# Patient Record
Sex: FEMALE | Race: WHITE | NOT HISPANIC OR LATINO | Employment: PART TIME
[De-identification: names, ages, dates, MRNs, and addresses within clinical notes are randomized per-mention and may not be internally consistent; named-entity substitution may affect disease eponyms.]

---

## 2017-12-17 ENCOUNTER — HEALTH MAINTENANCE LETTER (OUTPATIENT)
Age: 47
End: 2017-12-17

## 2020-08-05 ENCOUNTER — APPOINTMENT (OUTPATIENT)
Dept: CT IMAGING | Facility: HOSPITAL | Age: 50
End: 2020-08-05
Attending: EMERGENCY MEDICINE
Payer: COMMERCIAL

## 2020-08-05 ENCOUNTER — HOSPITAL ENCOUNTER (EMERGENCY)
Facility: HOSPITAL | Age: 50
Discharge: HOME | End: 2020-08-05
Attending: EMERGENCY MEDICINE | Admitting: EMERGENCY MEDICINE
Payer: COMMERCIAL

## 2020-08-05 VITALS
DIASTOLIC BLOOD PRESSURE: 76 MMHG | SYSTOLIC BLOOD PRESSURE: 120 MMHG | OXYGEN SATURATION: 94 % | HEART RATE: 71 BPM | RESPIRATION RATE: 18 BRPM | TEMPERATURE: 98.3 F | WEIGHT: 293 LBS

## 2020-08-05 DIAGNOSIS — K52.9 ILEOCOLITIS: ICD-10-CM

## 2020-08-05 LAB
ALBUMIN SERPL-MCNC: 3.4 G/DL (ref 3.4–5)
ALBUMIN UR-MCNC: 10 MG/DL
ALP SERPL-CCNC: 78 U/L (ref 40–150)
ALT SERPL W P-5'-P-CCNC: 19 U/L (ref 0–50)
AMORPH CRY #/AREA URNS HPF: ABNORMAL /HPF
ANION GAP SERPL CALCULATED.3IONS-SCNC: 2 MMOL/L (ref 3–14)
APPEARANCE UR: ABNORMAL
AST SERPL W P-5'-P-CCNC: 7 U/L (ref 0–45)
BACTERIA #/AREA URNS HPF: ABNORMAL /HPF
BASOPHILS # BLD AUTO: 0 10E9/L (ref 0–0.2)
BASOPHILS NFR BLD AUTO: 0.3 %
BILIRUB SERPL-MCNC: 0.5 MG/DL (ref 0.2–1.3)
BILIRUB UR QL STRIP: NEGATIVE
BUN SERPL-MCNC: 9 MG/DL (ref 7–30)
CALCIUM SERPL-MCNC: 8.7 MG/DL (ref 8.5–10.1)
CHLORIDE SERPL-SCNC: 109 MMOL/L (ref 94–109)
CO2 SERPL-SCNC: 29 MMOL/L (ref 20–32)
COLOR UR AUTO: YELLOW
CREAT SERPL-MCNC: 0.76 MG/DL (ref 0.52–1.04)
DIFFERENTIAL METHOD BLD: ABNORMAL
EOSINOPHIL # BLD AUTO: 0.3 10E9/L (ref 0–0.7)
EOSINOPHIL NFR BLD AUTO: 2.1 %
ERYTHROCYTE [DISTWIDTH] IN BLOOD BY AUTOMATED COUNT: 12.4 % (ref 10–15)
GFR SERPL CREATININE-BSD FRML MDRD: >90 ML/MIN/{1.73_M2}
GLUCOSE SERPL-MCNC: 111 MG/DL (ref 70–99)
GLUCOSE UR STRIP-MCNC: NEGATIVE MG/DL
HCG UR QL: NEGATIVE
HCT VFR BLD AUTO: 44.9 % (ref 35–47)
HGB BLD-MCNC: 15.1 G/DL (ref 11.7–15.7)
HGB UR QL STRIP: NEGATIVE
IMM GRANULOCYTES # BLD: 0.1 10E9/L (ref 0–0.4)
IMM GRANULOCYTES NFR BLD: 0.7 %
KETONES UR STRIP-MCNC: NEGATIVE MG/DL
LACTATE BLD-SCNC: 1.1 MMOL/L (ref 0.7–2)
LEUKOCYTE ESTERASE UR QL STRIP: NEGATIVE
LIPASE SERPL-CCNC: 146 U/L (ref 73–393)
LYMPHOCYTES # BLD AUTO: 2.6 10E9/L (ref 0.8–5.3)
LYMPHOCYTES NFR BLD AUTO: 20.3 %
MCH RBC QN AUTO: 30.3 PG (ref 26.5–33)
MCHC RBC AUTO-ENTMCNC: 33.6 G/DL (ref 31.5–36.5)
MCV RBC AUTO: 90 FL (ref 78–100)
MONOCYTES # BLD AUTO: 0.7 10E9/L (ref 0–1.3)
MONOCYTES NFR BLD AUTO: 5.2 %
MUCOUS THREADS #/AREA URNS LPF: PRESENT /LPF
NEUTROPHILS # BLD AUTO: 9.3 10E9/L (ref 1.6–8.3)
NEUTROPHILS NFR BLD AUTO: 71.4 %
NITRATE UR QL: NEGATIVE
NRBC # BLD AUTO: 0 10*3/UL
NRBC BLD AUTO-RTO: 0 /100
PH UR STRIP: 7.5 PH (ref 4.7–8)
PLATELET # BLD AUTO: 276 10E9/L (ref 150–450)
POTASSIUM SERPL-SCNC: 4.1 MMOL/L (ref 3.4–5.3)
PROT SERPL-MCNC: 7.4 G/DL (ref 6.8–8.8)
RBC # BLD AUTO: 4.98 10E12/L (ref 3.8–5.2)
RBC #/AREA URNS AUTO: 1 /HPF (ref 0–2)
SODIUM SERPL-SCNC: 140 MMOL/L (ref 133–144)
SOURCE: ABNORMAL
SP GR UR STRIP: 1.03 (ref 1–1.03)
SPECIMEN SOURCE: NORMAL
SQUAMOUS #/AREA URNS AUTO: 6 /HPF (ref 0–1)
UROBILINOGEN UR STRIP-MCNC: NORMAL MG/DL (ref 0–2)
WBC # BLD AUTO: 13 10E9/L (ref 4–11)
WBC #/AREA URNS AUTO: 1 /HPF (ref 0–5)
WET PREP SPEC: NORMAL

## 2020-08-05 PROCEDURE — 25000128 H RX IP 250 OP 636: Performed by: EMERGENCY MEDICINE

## 2020-08-05 PROCEDURE — 85025 COMPLETE CBC W/AUTO DIFF WBC: CPT | Performed by: EMERGENCY MEDICINE

## 2020-08-05 PROCEDURE — 87210 SMEAR WET MOUNT SALINE/INK: CPT | Performed by: EMERGENCY MEDICINE

## 2020-08-05 PROCEDURE — 96361 HYDRATE IV INFUSION ADD-ON: CPT

## 2020-08-05 PROCEDURE — 99284 EMERGENCY DEPT VISIT MOD MDM: CPT | Mod: 25

## 2020-08-05 PROCEDURE — 36415 COLL VENOUS BLD VENIPUNCTURE: CPT | Performed by: EMERGENCY MEDICINE

## 2020-08-05 PROCEDURE — 25500064 ZZH RX 255 OP 636: Performed by: RADIOLOGY

## 2020-08-05 PROCEDURE — 25800030 ZZH RX IP 258 OP 636: Performed by: EMERGENCY MEDICINE

## 2020-08-05 PROCEDURE — 83605 ASSAY OF LACTIC ACID: CPT | Performed by: EMERGENCY MEDICINE

## 2020-08-05 PROCEDURE — 81025 URINE PREGNANCY TEST: CPT | Performed by: EMERGENCY MEDICINE

## 2020-08-05 PROCEDURE — 25000132 ZZH RX MED GY IP 250 OP 250 PS 637: Performed by: EMERGENCY MEDICINE

## 2020-08-05 PROCEDURE — 83690 ASSAY OF LIPASE: CPT | Performed by: EMERGENCY MEDICINE

## 2020-08-05 PROCEDURE — 81001 URINALYSIS AUTO W/SCOPE: CPT | Performed by: EMERGENCY MEDICINE

## 2020-08-05 PROCEDURE — 96374 THER/PROPH/DIAG INJ IV PUSH: CPT | Mod: 59

## 2020-08-05 PROCEDURE — 99283 EMERGENCY DEPT VISIT LOW MDM: CPT | Mod: Z6 | Performed by: EMERGENCY MEDICINE

## 2020-08-05 PROCEDURE — 74177 CT ABD & PELVIS W/CONTRAST: CPT | Mod: TC

## 2020-08-05 PROCEDURE — 80053 COMPREHEN METABOLIC PANEL: CPT | Performed by: EMERGENCY MEDICINE

## 2020-08-05 RX ORDER — IOPAMIDOL 612 MG/ML
100 INJECTION, SOLUTION INTRAVASCULAR ONCE
Status: COMPLETED | OUTPATIENT
Start: 2020-08-05 | End: 2020-08-05

## 2020-08-05 RX ORDER — METRONIDAZOLE 500 MG/1
500 TABLET ORAL 3 TIMES DAILY
Qty: 29 TABLET | Refills: 0 | Status: SHIPPED | OUTPATIENT
Start: 2020-08-05 | End: 2020-08-15

## 2020-08-05 RX ORDER — METRONIDAZOLE 500 MG/1
500 TABLET ORAL ONCE
Status: COMPLETED | OUTPATIENT
Start: 2020-08-05 | End: 2020-08-05

## 2020-08-05 RX ORDER — TAMOXIFEN CITRATE 10 MG/1
10 TABLET ORAL DAILY
COMMUNITY
End: 2021-10-14

## 2020-08-05 RX ORDER — MORPHINE SULFATE 4 MG/ML
4 INJECTION, SOLUTION INTRAMUSCULAR; INTRAVENOUS ONCE
Status: COMPLETED | OUTPATIENT
Start: 2020-08-05 | End: 2020-08-05

## 2020-08-05 RX ORDER — SODIUM CHLORIDE 9 MG/ML
INJECTION, SOLUTION INTRAVENOUS CONTINUOUS
Status: DISCONTINUED | OUTPATIENT
Start: 2020-08-05 | End: 2020-08-05 | Stop reason: HOSPADM

## 2020-08-05 RX ORDER — CIPROFLOXACIN 500 MG/1
500 TABLET, FILM COATED ORAL ONCE
Status: COMPLETED | OUTPATIENT
Start: 2020-08-05 | End: 2020-08-05

## 2020-08-05 RX ORDER — CIPROFLOXACIN 500 MG/1
500 TABLET, FILM COATED ORAL 2 TIMES DAILY
Qty: 19 TABLET | Refills: 0 | Status: SHIPPED | OUTPATIENT
Start: 2020-08-05 | End: 2020-08-15

## 2020-08-05 RX ADMIN — SODIUM CHLORIDE: 9 INJECTION, SOLUTION INTRAVENOUS at 09:37

## 2020-08-05 RX ADMIN — MORPHINE SULFATE 4 MG: 4 INJECTION, SOLUTION INTRAMUSCULAR; INTRAVENOUS at 09:40

## 2020-08-05 RX ADMIN — METRONIDAZOLE 500 MG: 500 TABLET ORAL at 12:32

## 2020-08-05 RX ADMIN — IOPAMIDOL 100 ML: 612 INJECTION, SOLUTION INTRAVENOUS at 11:13

## 2020-08-05 RX ADMIN — CIPROFLOXACIN HYDROCHLORIDE 500 MG: 500 TABLET, FILM COATED ORAL at 12:33

## 2020-08-05 ASSESSMENT — ENCOUNTER SYMPTOMS
NECK STIFFNESS: 0
NECK PAIN: 0
HEMATOLOGIC/LYMPHATIC NEGATIVE: 1
ALLERGIC/IMMUNOLOGIC NEGATIVE: 1
MYALGIAS: 0
ABDOMINAL PAIN: 1
ENDOCRINE NEGATIVE: 1
EYES NEGATIVE: 1
RESPIRATORY NEGATIVE: 1
CARDIOVASCULAR NEGATIVE: 1
NAUSEA: 0
VOMITING: 0
NEUROLOGICAL NEGATIVE: 1
CONSTITUTIONAL NEGATIVE: 1
MUSCULOSKELETAL NEGATIVE: 1
PSYCHIATRIC NEGATIVE: 1

## 2020-08-05 NOTE — ED NOTES
Discharge instructions given. Verbalized understanding. Denies pain at this time. Declined discharge vitals. Denies any questions or concerns. Ambulated out of ED independently with steady gait.

## 2020-08-05 NOTE — ED PROVIDER NOTES
"  History     Chief Complaint   Patient presents with     Abdominal Pain     c/o abdominal pain for 2 days. Denies nausea, vomiting, diarrhea, constipation, or fevers. When asked where pain is points to entire abdomen area and states \"it's all over.\" Rates pain 10/10. Is sitting calmly with no noted distress. Reports history of breast cancer with double mastectomy in October of 2018.      ROQUE Berry is a 50 year old female who is today with complaints of abdominal pain.  Symptoms present x 2 days.  Patient denies any nausea vomiting or diarrhea.  Patient has otherwise been in usual state of health.  Patient denies any recent trauma.  No additional complaints    Allergies:  No Known Allergies    Problem List:    There are no active problems to display for this patient.       Past Medical History:    No past medical history on file.    Past Surgical History:    No past surgical history on file.    Family History:    No family history on file.    Social History:  Marital Status:  Single [1]  Social History     Tobacco Use     Smoking status: Not on file   Substance Use Topics     Alcohol use: Not on file     Drug use: Not on file        Medications:    tamoxifen (NOLVADEX) 10 MG tablet          Review of Systems   Constitutional: Negative.    HENT: Negative.    Eyes: Negative.    Respiratory: Negative.    Cardiovascular: Negative.    Gastrointestinal: Positive for abdominal pain. Negative for nausea and vomiting.   Endocrine: Negative.    Genitourinary: Negative.    Musculoskeletal: Negative.  Negative for myalgias, neck pain and neck stiffness.   Skin: Negative.    Allergic/Immunologic: Negative.    Neurological: Negative.    Hematological: Negative.    Psychiatric/Behavioral: Negative.        Physical Exam   BP: 153/90  Pulse: 84  Temp: 98.3  F (36.8  C)  Resp: 18  Weight: 133.5 kg (294 lb 6.4 oz)  SpO2: 97 %      Physical Exam  Exam conducted with a chaperone present (Done with Cyndy at bedside). "   Constitutional:       General: She is not in acute distress.     Appearance: She is well-developed and normal weight. She is not toxic-appearing.   HENT:      Head: Normocephalic and atraumatic.   Eyes:      Extraocular Movements: Extraocular movements intact.   Cardiovascular:      Rate and Rhythm: Normal rate and regular rhythm.   Pulmonary:      Effort: Pulmonary effort is normal.   Abdominal:      General: Abdomen is flat. Bowel sounds are normal. There is no distension. There are no signs of injury.      Palpations: Abdomen is soft.      Tenderness: There is no abdominal tenderness. There is no right CVA tenderness or left CVA tenderness.   Genitourinary:     Vagina: No signs of injury. No vaginal discharge or tenderness.      Adnexa: Right adnexa normal and left adnexa normal.   Skin:     General: Skin is warm.      Capillary Refill: Capillary refill takes less than 2 seconds.      Coloration: Skin is not cyanotic or mottled.      Findings: No erythema or rash.   Neurological:      General: No focal deficit present.      Mental Status: She is alert.   Psychiatric:         Mood and Affect: Mood normal. Mood is not anxious or depressed.         Behavior: Behavior normal.         ED Course     ED Course as of Aug 05 1814   Wed Aug 05, 2020   1032 Pelvic feeling better. Labs as below. CT of abd/pelvis ordered.       1246 Case discussed with Dr. Olson. Plan: discharge on abx and will be seen in clinic.         Procedures               Results for orders placed or performed during the hospital encounter of 08/05/20 (from the past 24 hour(s))   CBC with platelets differential   Result Value Ref Range    WBC 13.0 (H) 4.0 - 11.0 10e9/L    RBC Count 4.98 3.8 - 5.2 10e12/L    Hemoglobin 15.1 11.7 - 15.7 g/dL    Hematocrit 44.9 35.0 - 47.0 %    MCV 90 78 - 100 fl    MCH 30.3 26.5 - 33.0 pg    MCHC 33.6 31.5 - 36.5 g/dL    RDW 12.4 10.0 - 15.0 %    Platelet Count 276 150 - 450 10e9/L    Diff Method Automated Method     %  Neutrophils 71.4 %    % Lymphocytes 20.3 %    % Monocytes 5.2 %    % Eosinophils 2.1 %    % Basophils 0.3 %    % Immature Granulocytes 0.7 %    Nucleated RBCs 0 0 /100    Absolute Neutrophil 9.3 (H) 1.6 - 8.3 10e9/L    Absolute Lymphocytes 2.6 0.8 - 5.3 10e9/L    Absolute Monocytes 0.7 0.0 - 1.3 10e9/L    Absolute Eosinophils 0.3 0.0 - 0.7 10e9/L    Absolute Basophils 0.0 0.0 - 0.2 10e9/L    Abs Immature Granulocytes 0.1 0 - 0.4 10e9/L    Absolute Nucleated RBC 0.0    Comprehensive metabolic panel   Result Value Ref Range    Sodium 140 133 - 144 mmol/L    Potassium 4.1 3.4 - 5.3 mmol/L    Chloride 109 94 - 109 mmol/L    Carbon Dioxide 29 20 - 32 mmol/L    Anion Gap 2 (L) 3 - 14 mmol/L    Glucose 111 (H) 70 - 99 mg/dL    Urea Nitrogen 9 7 - 30 mg/dL    Creatinine 0.76 0.52 - 1.04 mg/dL    GFR Estimate >90 >60 mL/min/[1.73_m2]    GFR Estimate If Black >90 >60 mL/min/[1.73_m2]    Calcium 8.7 8.5 - 10.1 mg/dL    Bilirubin Total 0.5 0.2 - 1.3 mg/dL    Albumin 3.4 3.4 - 5.0 g/dL    Protein Total 7.4 6.8 - 8.8 g/dL    Alkaline Phosphatase 78 40 - 150 U/L    ALT 19 0 - 50 U/L    AST 7 0 - 45 U/L   Lipase   Result Value Ref Range    Lipase 146 73 - 393 U/L   Lactic acid whole blood   Result Value Ref Range    Lactic Acid 1.1 0.7 - 2.0 mmol/L   UA with Microscopic   Result Value Ref Range    Color Urine Yellow     Appearance Urine Slightly Cloudy     Glucose Urine Negative NEG^Negative mg/dL    Bilirubin Urine Negative NEG^Negative    Ketones Urine Negative NEG^Negative mg/dL    Specific Gravity Urine 1.027 1.003 - 1.035    Blood Urine Negative NEG^Negative    pH Urine 7.5 4.7 - 8.0 pH    Protein Albumin Urine 10 (A) NEG^Negative mg/dL    Urobilinogen mg/dL Normal 0.0 - 2.0 mg/dL    Nitrite Urine Negative NEG^Negative    Leukocyte Esterase Urine Negative NEG^Negative    Source Midstream Urine     WBC Urine 1 0 - 5 /HPF    RBC Urine 1 0 - 2 /HPF    Bacteria Urine Few (A) NEG^Negative /HPF    Squamous Epithelial /HPF Urine 6  (H) 0 - 1 /HPF    Mucous Urine Present (A) NEG^Negative /LPF    Amorphous Crystals Many (A) NEG^Negative /HPF   HCG qualitative urine (UPT)   Result Value Ref Range    HCG Qual Urine Negative NEG^Negative   Wet prep    Specimen: Vagina   Result Value Ref Range    Specimen Description Vagina     Wet Prep Few  WBC'S seen       Wet Prep No Trichomonas seen     Wet Prep No clue cells seen     Wet Prep No yeast seen    CT Abdomen Pelvis w Contrast    Narrative    EXAMINATION: CT ABDOMEN PELVIS W CONTRAST, 8/5/2020 11:25 AM    TECHNIQUE:  Helical CT images from the lung bases through the  symphysis pubis were obtained  with IV contrast. Contrast dose: Isovue  300 (100 mL)    COMPARISON: none    HISTORY: Patient with complaints of abdominal pain.  Rule out  obstruction versus appendicitis    FINDINGS:    The lung bases are clear.    There is a 2 cm in diameter low-density lesion high in the dome of the  liver close to the inferior vena cava. There is a low-density lesion  in the right lobe of the liver measuring 2.5 cm in diameter. Both  benign or malignant liver lesions could this appearance. The  gallbladder is been removed. The spleen and pancreas    The the spleen and pancreas appear normal.    The adrenal glands are normal.    The right and left kidneys are free of masses or hydronephrosis.    The periaortic lymph nodes are normal in caliber.    There is abnormal bowel wall thickening in the terminal ileum and  cecum suspicious for ileocolitis. The appendix appears normal.    In the pelvis the bladder and rectum appear normal. There is a mass  extending from the uterus measuring 6.9 cm in diameter most likely a  fibroid.    The regional skeleton is intact      Impression    IMPRESSION: Abnormal bowel wall thickening of the terminal ileum and  cecum consistent with ileocolitis.    2 masses are identified in the liver both benign or malignant liver  masses could have this appearance.    Large uterine fibroid.     OBINNA  MD MATT       Medications   sodium chloride 0.9% infusion (has no administration in time range)   morphine (PF) injection 4 mg (has no administration in time range)       Assessments & Plan (with Medical Decision Making)     50-year-old female who presents today with complaints of abdominal pain.  Symptoms began approximately 2 days ago.  Has a history of breast cancer.  States that she was told that she needed a colonoscopy but she has been not scheduled one.  Exam showed diffusely tender abdomen but nondistended and nonsurgical.      Labs as above and a CT of the abdomen and pelvis showed abnormal thickening of the terminal ileum and cecum consistent with ileocolitis.  In addition two masses noted in the liver of unknown etiology.  Differential will include metastatic cancer given previous history.    Lab results and CT findings reviewed with on-call surgeon who agreed that patient needs close follow-up.  Patient referred to surgery and will have a colonoscopy scheduled next week.  He states that if colonoscopy is unremarkable an MRI of the abdomen may be ordered to evaluate liver lesions.      My discussion with the surgeon and all labs and CT findings discussed with patient who agrees and will follow-up with surgery next week.  Patient started on Cipro and Flagyl.  First dose given here in the emergency department.  Patient understands to return if she develops any new or worsening symptoms.    Due to the nature of this electronic medical record, laboratory results, imaging results, diagnosis, other information and medications reported above may not represent information available to me at the the time of my care and disposition. Medications reported above may have not been ordered by me.     Portions of the record may have been created with voice recognition software. Occasional wrong-word or 'sound-a- like' substitution may have occurred due to the inherent limitations of voice recognition software.  Though the chart has been reviewed, there may be inadvertent transcription errors. Read the chart carefully and recognize, using context, where substitutions have occurred.       New Prescriptions    No medications on file       Final diagnoses:   Ileocolitis       8/5/2020   [facility]     Jackie Bellamy MD  08/05/20 [id number]

## 2020-08-05 NOTE — DISCHARGE INSTRUCTIONS
1) Follow the aftercare instructions provided  2) You have been given a prescription for a medication that can cause an allergic reactions. Return to the ER if you develop any itching, tongue swelling, wheezing or shortness of breath.  3) follow-up with your doctor in 12 to 24 hours

## 2020-08-05 NOTE — ED AVS SNAPSHOT
[facility]  [address]  [place] [address]  Phone:  [phone]                                    Liza Goins   MRN: [id number]    Department:  [facility]   Date of Visit:  8/5/2020           After Visit Summary Signature Page    I have received my discharge instructions, and my questions have been answered. I have discussed any challenges I see with this plan with the nurse or doctor.    ..........................................................................................................................................  Patient/Patient Representative Signature      ..........................................................................................................................................  Patient Representative Print Name and Relationship to Patient    ..................................................               ................................................  Date                                   Time    ..........................................................................................................................................  Reviewed by Signature/Title    ...................................................              ..............................................  Date                                               Time          22EPIC Rev 08/18

## 2020-09-09 ENCOUNTER — OFFICE VISIT (OUTPATIENT)
Dept: SURGERY | Facility: OTHER | Age: 50
End: 2020-09-09
Attending: EMERGENCY MEDICINE
Payer: COMMERCIAL

## 2020-09-09 ENCOUNTER — PREP FOR PROCEDURE (OUTPATIENT)
Dept: SURGERY | Facility: OTHER | Age: 50
End: 2020-09-09

## 2020-09-09 VITALS
BODY MASS INDEX: 47.09 KG/M2 | DIASTOLIC BLOOD PRESSURE: 78 MMHG | TEMPERATURE: 97.6 F | OXYGEN SATURATION: 98 % | SYSTOLIC BLOOD PRESSURE: 128 MMHG | WEIGHT: 293 LBS | HEIGHT: 66 IN | HEART RATE: 76 BPM

## 2020-09-09 DIAGNOSIS — Z87.19 HISTORY OF COLITIS: Primary | ICD-10-CM

## 2020-09-09 PROCEDURE — 99203 OFFICE O/P NEW LOW 30 MIN: CPT | Performed by: SURGERY

## 2020-09-09 PROCEDURE — G0463 HOSPITAL OUTPT CLINIC VISIT: HCPCS

## 2020-09-09 ASSESSMENT — MIFFLIN-ST. JEOR: SCORE: 1962.85

## 2020-09-09 ASSESSMENT — PAIN SCALES - GENERAL: PAINLEVEL: NO PAIN (0)

## 2020-09-09 NOTE — PATIENT INSTRUCTIONS
We want to your Colonoscopy to be as pleasant as possible. Please review the instructions below. If you have questions, you may contact us at the any of the following numbers:   [facility] Unit Coordinator: [phone]  Clinic Nurse ([place]): [phone]  Surgery Education Nurse: [phone]    Date of procedure: 9/24/20 with Dr. Olson  Admit Time: Hospital Surgery will call you the day before your procedure by 5pm with your arrival time. If your surgery is on Monday, expect a call on Friday.  If you are not contacted before 5PM, please call admitting at [phone].   After hours or on weekends, please call [phone] to postpone.   Call the clinic nurse if you become ill within 1 week of your procedure to reschedule.     Your covid-19 test is scheduled for:   September 20, 2020 at 11:15 AM    COVID-19 test is needed 4 days before procedure in the morning. This is done as drive-up curbside testing in the white tent on the West side of the [facility] parking lot in your vehicle. Follow the signage and bring your mobile phone if you have one to call the phone number on the sign outside the tent for check-in. If you do not have a cell phone, please call the nurse for instructions on checking in.       Please  the following over the counter items for your bowel prep:      Two 5 mg Dulcolax (bisacodyl) tablets   One 8.3 ounce (238 g) bottle of miralax   One 10 ounce bottle of magnesium citrate   One 64 ounce bottle of gatorade-Not red, purple, or powdered.    7 DAYS BEFORE THE EXAM:Starting on 9/17/20  Call the Surgery Education Nurse at [phone] and have a medication list ready.   Stop Aspirin or NSAIDS (Ibuprofen, Celebrex, Naproxen, etc) 7 days before surgery.  Stop fiber supplements, herbals, vitamins, and iron. Stop eating corn, nuts and seeds.  If you are prescribed a daily 81mg Aspirin, you may continue this.  If you are prescribed blood thinners or insulin, talk to your primary provider  "for instructions.    2 DAYS BEFORE THE EXAM:  September 22, 2020  Low fiber diet.   See list of low fiber foods on page 3 of the \"Miralax, Dulcolax and Magnesium Citrate\" packet.   Drink at least 4-6 large glasses of sports drink today and tomorrow. Avoid red and purple.    1 DAY BEFORE THE EXAM:  September 23, 2020  No solid food/milk products after 12:01 AM. Drink only clear liquids all day, at least 8-10 glasses.   Please see list of clear liquids on page 2 of \"Miralax, Dulcolax and Magnesium Citrate\" packet.    Avoid anything red or purple. No alcohol.            AT 12:00 PM NOON THE DAY BEFORE EXAM:  Take 2 Dulcolax tablets by mouth with clear liquids.            AT 6:00 PM THE DAY BEFORE EXAM:  Mix the bottle of Miralax and 64 oz. of Gatorade in a pitcher.   Drink one 8 oz. glass every 10-15 minutes until gone. Stay near a toilet.     DAY OF COLONOSCOPY:  September 24, 2020            6 HOURS PRIOR TO EXAM ON DAY OF PROCEDURE:  Drink the bottle of magnesium citrate followed by a full glass of water.   You may have clear liquids up until 2 hours before arrival.  If you need to take any medications after this, take them with a tiny sip of water.   If you have asthma, bring your inhaler with you.  Shower before arrival and wear clean, comfortable clothes.   No jewelry, make-up, nail polish, hair spray, lotions, or perfumes.   [place] in Admitting through the [place] [facility].   You must have a responsible adult to drive and to stay with you for 4 hours at home.     TIPS FOR COLON CLEANSING BEFORE YOUR COLONOSCOPY  To get accurate results from your exam, your colon must be completely empty or you may need to repeat the colon prep and exam. If you followed instructions and your stool is clear or yellow liquid, you are ready. If you are not sure if your colon is clean, please call the clinic nurse.    You may use Tucks wipes, hemorrhoid treatments, hydrocortisone cream or alcohol-free baby wipes to ease anal " irritation. You may also use Vaseline to help protect the skin.     Quickly drink each glass. Even when you are sitting on the toilet, keep drinking every 15 minutes. If you have nausea or vomiting, take a break for 30 minutes and then resume drinking.    You will have loose watery stools and may also have chills. Dress for comfort. Expect to feel bloating, nausea and other discomfort until the stool clears from your colon.

## 2020-09-09 NOTE — NURSING NOTE
"Chief Complaint   Patient presents with     Consult     Ileocolitis-Dr Bellamy is referring- ER- Dr Heath is primary ([facility])-CT scan done 8/5/20- Abdominal pain on right lower quadrant       Initial /78 (BP Location: Right arm, Patient Position: Chair, Cuff Size: Adult Large)   Pulse 76   Temp 97.6  F (36.4  C) (Tympanic)   Ht 1.664 m (5' 5.5\")   Wt 132.9 kg (293 lb)   SpO2 98%   BMI 48.02 kg/m   Estimated body mass index is 48.02 kg/m  as calculated from the following:    Height as of this encounter: 1.664 m (5' 5.5\").    Weight as of this encounter: 132.9 kg (293 lb).  Medication Reconciliation: complete  MODESTO GARZA LPN    "

## 2020-09-10 PROBLEM — Z87.19 HISTORY OF COLITIS: Status: ACTIVE | Noted: 2020-09-10

## 2020-09-11 NOTE — PROGRESS NOTES
"Surgery Consult Clinic Note      RE: Liza Goins  : 1970  ARABELLA: 2020      Chief Complaint:  History of colitis    History of Present Illness:  Liza Goins is a very pleasant 50 year old year old female who I am seeing at the request of Dr. Bellamy for evaluation of screening colon malignant neoplasm and consideration for colonoscopy. She was recently seen in the ED for abdominal pain. She was noted to have inflammation of the terminal ileum and colon on CT. I cannot get images to load in both PACx and through Epic. She was placed on antibiotics and is here for follow up. She denies family history of colon or rectal cancer, blood in stool, changes in bowel habits, weight loss. She does have personal history of breast cancer.  Surgical hx: bilateral mastectomy.     She specifically denies fever, chills, nausea, vomiting, chest pain, shortness of breath or palpitations.      Medical history:  No past medical history on file.  In care everywhere   Surgical history:  No past surgical history on file.  Same as above   Family history:  No family history on file.    Medications:  Current Outpatient Medications   Medication Sig Dispense Refill     tamoxifen (NOLVADEX) 10 MG tablet Take 10 mg by mouth daily       Allergies:  The patienthas No Known Allergies.  .  Social history:  Social History     Tobacco Use     Smoking status: Not on file   Substance Use Topics     Alcohol use: Not on file     Marital status: single.  Occupation: currently not working.    Review of Systems:  10 point review of systems was obtained and negative other than what previously mentioned in HPI    Physical Examination:  /78 (BP Location: Right arm, Patient Position: Chair, Cuff Size: Adult Large)   Pulse 76   Temp 97.6  F (36.4  C) (Tympanic)   Ht 1.664 m (5' 5.5\")   Wt 132.9 kg (293 lb)   SpO2 98%   BMI 48.02 kg/m    General: AAOx4, NAD, WN/WD, ambulating without assistance  HEENT:NCAT, EOMI, PERRL Sclerae anicteric; " Trachea mideline,   Chest:  No acute distress, CTAB    Cardiac:  regular rate and rhythm, no murmur   Abdomen: obese   Extremities: Cursory exam unremarkable.  Skin: Warm, dry,   Neuro: no focal deficit,   Psych: happy, calm, asks appropriate questions      Assessment/Plan:  50 y.o. female with recent bout of colitis. She has improved on antibiotics. Will need colonoscopy to rule out inflammatory bowel disease. She did have follow up MRI for liver lesions seen on CT that was consistent with hemangiomas. No follow up required.    Satisfactory candidate for colonoscopy.  The indications, risks, benefits and technical aspects of whole colon colonoscopy were outlined with risks including, but not limited to, perforation, bleeding and inability to visualize entire colon.  Management of each was reviewed.  The need of mechanical preparation of the colon was reviewed along with the use of monitored anesthetic care.  The patient's questions were asked and answered.        Bong Olson MD

## 2020-09-17 ENCOUNTER — ANESTHESIA EVENT (OUTPATIENT)
Dept: SURGERY | Facility: HOSPITAL | Age: 50
End: 2020-09-17
Payer: COMMERCIAL

## 2020-09-17 NOTE — ANESTHESIA PREPROCEDURE EVALUATION
Anesthesia Pre-Procedure Evaluation    Patient: Liza Goins   MRN: [id number] : 1970          Preoperative Diagnosis: History of colitis [Z87.19]    Procedure(s):  diagnostic colonoscopy possible biopsy possible polypectomy    No past medical history on file.  No past surgical history on file.    Anesthesia Evaluation     . Pt has had prior anesthetic.     No history of anesthetic complications          ROS/MED HX    ENT/Pulmonary:     (+)sleep apnea, allergic rhinitis, Intermittent asthma Treatment: Inhaler prn,  uses CPAP , . .   (-) recent URI   Neurologic:     (+)neuropathy - left arm paresthesias,     Cardiovascular:     (+) ----. : . . . :. Irregular Heartbeat/Palpitations, . No previous cardiac testing       METS/Exercise Tolerance:  >4 METS   Hematologic:  - neg hematologic  ROS       Musculoskeletal:   (+)  other musculoskeletal- DJD      GI/Hepatic:     (+) GERD Asymptomatic on medication, bowel prep, liver disease (hemangiomas on liver), Other GI/Hepatic colitis      Renal/Genitourinary:  - ROS Renal section negative       Endo:     (+) Obesity, .      Psychiatric:  - neg psychiatric ROS       Infectious Disease:  - neg infectious disease ROS      (-) Recent Fever   Malignancy:   (+) Malignancy History of Breast  Breast CA Remission status post Surgery.         Other:    (+) No chance of pregnancy                         Physical Exam  Normal systems: cardiovascular, pulmonary and dental    Airway   Mallampati: III  TM distance: >3 FB  Neck ROM: full    Dental     Cardiovascular   Rhythm and rate: regular and normal      Pulmonary    breath sounds clear to auscultation            Lab Results   Component Value Date    WBC 13.0 (H) 2020    HGB 15.1 2020    HCT 44.9 2020     2020     2020    POTASSIUM 4.1 2020    CHLORIDE 109 2020    CO2 29 2020    BUN 9 2020    CR 0.76 2020     (H) 2020    EHSAN 8.7 2020     "ALBUMIN 3.4 08/05/2020    PROTTOTAL 7.4 08/05/2020    ALT 19 08/05/2020    AST 7 08/05/2020    ALKPHOS 78 08/05/2020    BILITOTAL 0.5 08/05/2020    LIPASE 146 08/05/2020    HCG Negative 08/05/2020       Preop Vitals  BP Readings from Last 3 Encounters:   09/09/20 128/78   08/05/20 120/76    Pulse Readings from Last 3 Encounters:   09/09/20 76   08/05/20 71      Resp Readings from Last 3 Encounters:   08/05/20 18    SpO2 Readings from Last 3 Encounters:   09/09/20 98%   08/05/20 94%      Temp Readings from Last 1 Encounters:   09/09/20 97.6  F (36.4  C) (Tympanic)    Ht Readings from Last 1 Encounters:   09/09/20 1.664 m (5' 5.5\")      Wt Readings from Last 1 Encounters:   09/09/20 132.9 kg (293 lb)    Estimated body mass index is 48.02 kg/m  as calculated from the following:    Height as of 9/9/20: 1.664 m (5' 5.5\").    Weight as of 9/9/20: 132.9 kg (293 lb).       Anesthesia Plan      History & Physical Review  History and physical reviewed and following examination; no interval change.    ASA Status:  3 .    NPO Status:  > 8 hours    Plan for MAC with Intravenous and Propofol induction. Maintenance will be TIVA.  Reason for MAC:  Deep or markedly invasive procedure (G8)  PONV prophylaxis:  Ondansetron (or other 5HT-3)  Consult 9/11/20 tyrone        Postoperative Care  Postoperative pain management:  IV analgesics.      Consents  Anesthetic plan, risks, benefits and alternatives discussed with:  Patient.  Use of blood products discussed: No .   .                 MARTY Fuentes CRNA  "

## 2020-09-20 ENCOUNTER — OFFICE VISIT (OUTPATIENT)
Dept: FAMILY MEDICINE | Facility: OTHER | Age: 50
End: 2020-09-20
Attending: FAMILY MEDICINE
Payer: COMMERCIAL

## 2020-09-20 DIAGNOSIS — Z87.19 HISTORY OF COLITIS: ICD-10-CM

## 2020-09-20 PROCEDURE — U0003 INFECTIOUS AGENT DETECTION BY NUCLEIC ACID (DNA OR RNA); SEVERE ACUTE RESPIRATORY SYNDROME CORONAVIRUS 2 (SARS-COV-2) (CORONAVIRUS DISEASE [COVID-19]), AMPLIFIED PROBE TECHNIQUE, MAKING USE OF HIGH THROUGHPUT TECHNOLOGIES AS DESCRIBED BY CMS-2020-01-R: HCPCS | Mod: ZL | Performed by: SURGERY

## 2020-09-21 LAB
SARS-COV-2 RNA SPEC QL NAA+PROBE: NOT DETECTED
SPECIMEN SOURCE: NORMAL

## 2020-09-24 ENCOUNTER — ANESTHESIA (OUTPATIENT)
Dept: SURGERY | Facility: HOSPITAL | Age: 50
End: 2020-09-24
Payer: COMMERCIAL

## 2020-09-24 ENCOUNTER — HOSPITAL ENCOUNTER (OUTPATIENT)
Facility: HOSPITAL | Age: 50
Discharge: HOME | End: 2020-09-24
Attending: SURGERY | Admitting: SURGERY
Payer: COMMERCIAL

## 2020-09-24 VITALS
SYSTOLIC BLOOD PRESSURE: 134 MMHG | OXYGEN SATURATION: 98 % | RESPIRATION RATE: 16 BRPM | HEIGHT: 66 IN | TEMPERATURE: 97.9 F | BODY MASS INDEX: 45.8 KG/M2 | HEART RATE: 70 BPM | WEIGHT: 285 LBS | DIASTOLIC BLOOD PRESSURE: 70 MMHG

## 2020-09-24 DIAGNOSIS — Z87.19 HISTORY OF COLITIS: ICD-10-CM

## 2020-09-24 LAB — HCG UR QL: NEGATIVE

## 2020-09-24 PROCEDURE — 37000008 ZZH ANESTHESIA TECHNICAL FEE, 1ST 30 MIN: Performed by: SURGERY

## 2020-09-24 PROCEDURE — 81025 URINE PREGNANCY TEST: CPT | Performed by: NURSE ANESTHETIST, CERTIFIED REGISTERED

## 2020-09-24 PROCEDURE — 45380 COLONOSCOPY AND BIOPSY: CPT | Performed by: NURSE ANESTHETIST, CERTIFIED REGISTERED

## 2020-09-24 PROCEDURE — 40000305 ZZH STATISTIC PRE PROC ASSESS I: Performed by: SURGERY

## 2020-09-24 PROCEDURE — 45380 COLONOSCOPY AND BIOPSY: CPT | Performed by: SURGERY

## 2020-09-24 PROCEDURE — 71000027 ZZH RECOVERY PHASE 2 EACH 15 MINS: Performed by: SURGERY

## 2020-09-24 PROCEDURE — 25000128 H RX IP 250 OP 636: Performed by: NURSE ANESTHETIST, CERTIFIED REGISTERED

## 2020-09-24 PROCEDURE — 25800030 ZZH RX IP 258 OP 636: Performed by: NURSE ANESTHETIST, CERTIFIED REGISTERED

## 2020-09-24 PROCEDURE — 36000050 ZZH SURGERY LEVEL 2 1ST 30 MIN: Performed by: SURGERY

## 2020-09-24 PROCEDURE — 27210794 ZZH OR GENERAL SUPPLY STERILE: Performed by: SURGERY

## 2020-09-24 PROCEDURE — 88305 TISSUE EXAM BY PATHOLOGIST: CPT | Mod: TC | Performed by: SURGERY

## 2020-09-24 RX ORDER — LIDOCAINE 40 MG/G
CREAM TOPICAL
Status: DISCONTINUED | OUTPATIENT
Start: 2020-09-24 | End: 2020-09-24 | Stop reason: HOSPADM

## 2020-09-24 RX ORDER — ONDANSETRON 2 MG/ML
4 INJECTION INTRAMUSCULAR; INTRAVENOUS EVERY 30 MIN PRN
Status: DISCONTINUED | OUTPATIENT
Start: 2020-09-24 | End: 2020-09-24 | Stop reason: HOSPADM

## 2020-09-24 RX ORDER — MEPERIDINE HYDROCHLORIDE 25 MG/ML
12.5 INJECTION INTRAMUSCULAR; INTRAVENOUS; SUBCUTANEOUS
Status: DISCONTINUED | OUTPATIENT
Start: 2020-09-24 | End: 2020-09-24 | Stop reason: HOSPADM

## 2020-09-24 RX ORDER — PROPOFOL 10 MG/ML
INJECTION, EMULSION INTRAVENOUS PRN
Status: DISCONTINUED | OUTPATIENT
Start: 2020-09-24 | End: 2020-09-24

## 2020-09-24 RX ORDER — NALOXONE HYDROCHLORIDE 0.4 MG/ML
.1-.4 INJECTION, SOLUTION INTRAMUSCULAR; INTRAVENOUS; SUBCUTANEOUS
Status: DISCONTINUED | OUTPATIENT
Start: 2020-09-24 | End: 2020-09-24 | Stop reason: HOSPADM

## 2020-09-24 RX ORDER — SODIUM CHLORIDE, SODIUM LACTATE, POTASSIUM CHLORIDE, CALCIUM CHLORIDE 600; 310; 30; 20 MG/100ML; MG/100ML; MG/100ML; MG/100ML
INJECTION, SOLUTION INTRAVENOUS CONTINUOUS
Status: DISCONTINUED | OUTPATIENT
Start: 2020-09-24 | End: 2020-09-24 | Stop reason: HOSPADM

## 2020-09-24 RX ORDER — ONDANSETRON 4 MG/1
4 TABLET, ORALLY DISINTEGRATING ORAL EVERY 30 MIN PRN
Status: DISCONTINUED | OUTPATIENT
Start: 2020-09-24 | End: 2020-09-24 | Stop reason: HOSPADM

## 2020-09-24 RX ADMIN — PROPOFOL 20 MG: 10 INJECTION, EMULSION INTRAVENOUS at 13:16

## 2020-09-24 RX ADMIN — PROPOFOL 20 MG: 10 INJECTION, EMULSION INTRAVENOUS at 13:22

## 2020-09-24 RX ADMIN — PROPOFOL 50 MG: 10 INJECTION, EMULSION INTRAVENOUS at 13:12

## 2020-09-24 RX ADMIN — PROPOFOL 20 MG: 10 INJECTION, EMULSION INTRAVENOUS at 13:21

## 2020-09-24 RX ADMIN — PROPOFOL 20 MG: 10 INJECTION, EMULSION INTRAVENOUS at 13:14

## 2020-09-24 RX ADMIN — PROPOFOL 20 MG: 10 INJECTION, EMULSION INTRAVENOUS at 13:20

## 2020-09-24 RX ADMIN — PROPOFOL 20 MG: 10 INJECTION, EMULSION INTRAVENOUS at 13:18

## 2020-09-24 RX ADMIN — SODIUM CHLORIDE, POTASSIUM CHLORIDE, SODIUM LACTATE AND CALCIUM CHLORIDE: 600; 310; 30; 20 INJECTION, SOLUTION INTRAVENOUS at 12:16

## 2020-09-24 RX ADMIN — PROPOFOL 20 MG: 10 INJECTION, EMULSION INTRAVENOUS at 13:15

## 2020-09-24 RX ADMIN — PROPOFOL 20 MG: 10 INJECTION, EMULSION INTRAVENOUS at 13:19

## 2020-09-24 RX ADMIN — PROPOFOL 20 MG: 10 INJECTION, EMULSION INTRAVENOUS at 13:17

## 2020-09-24 RX ADMIN — PROPOFOL 30 MG: 10 INJECTION, EMULSION INTRAVENOUS at 13:13

## 2020-09-24 RX ADMIN — PROPOFOL 50 MG: 10 INJECTION, EMULSION INTRAVENOUS at 13:11

## 2020-09-24 ASSESSMENT — MIFFLIN-ST. JEOR: SCORE: 1926.56

## 2020-09-24 NOTE — ANESTHESIA POSTPROCEDURE EVALUATION
Patient: Liza Goins    Procedure(s):  diagnostic colonoscopy with biopsy    Diagnosis:History of colitis [Z87.19]  Diagnosis Additional Information: No value filed.    Anesthesia Type:  MAC    Note:  Anesthesia Post Evaluation    Patient participation: Able to fully participate in evaluation  Level of consciousness: awake  Pain management: adequate  Airway patency: patent  Cardiovascular status: acceptable  Respiratory status: acceptable  Hydration status: acceptable  PONV: none     Anesthetic complications: None          Last vitals:  Vitals:    09/24/20 1415 09/24/20 1420 09/24/20 1425   BP: 133/55 134/70    Pulse: 68 70    Resp: 16 16    Temp:  97.9  F (36.6  C)    SpO2: 96% 97% 98%         Electronically Signed By: MARTY Sanz CRNA  September 24, 2020  2:44 PM

## 2020-09-24 NOTE — OP NOTE
Liza Goins MRN# [id number]   YOB: 1970 Age: 50 year old      Date of Admission:  9/24/2020  Date of Service:   9/24/20    Primary care provider: Nena Heath    PREOPERATIVE DIAGNOSIS:  History of enteritis        POSTOPERATIVE DIAGNOSIS:  Mild cecal inflammation          PROCEDURE:  Colonoscopy with biopsy          INDICATIONS:  Diagnostic colonoscopy.      Specimen:   ID Type Source Tests Collected by Time Destination   A :  Biopsy Large Intestine, Cecum SURGICAL PATHOLOGY EXAM Bong Olson MD 9/24/2020  1:19 PM        SURGEON: Bong Olson MD    DESCRIPTION OF PROCEDURE: Liza Goins was brought into the endoscopy suite and placed in the left lateral decubitus position. After preprocedural pause and attended monitored anesthesia was administered, the external anus was inspected and was noted to have large hemorrhoids. Digital rectal exam was normal. The colonoscope was inserted and advanced under direct visualization to the level of the cecum which was identified by the appendiceal orifice and the ileocecal valve. There was noted to be some mild inflammation at the base of appendix biopsied with forceps. The terminal ileum was intubated and traversed aprox 10 cm without any mucosal abnormality.  The prep was excellent.. Upon slow withdrawal of the colonoscope, approximately 95% of the mucosa was directly visualized. The rest of the colon was without mucosal abnormality. There was no evidence of further polyps, inflammation, bleeding or AVMs. Retroflexion of the rectum again showed hemorrhoidal tissue. The extra air was removed from the colon, and the colonoscope withdrawn. The patient tolerated the procedure well and was taken to postanesthesia care unit.     We invite the patient to return in 5 years for follow up screening evaluation, related to family history.   Bong Olson MD

## 2020-09-24 NOTE — OR NURSING
The patient was discharged ambulatory to her daughter Daphney picking her up for discharge. She denies discomfort upon discharge.Gait and balance steady.

## 2020-09-24 NOTE — BRIEF OP NOTE
[facility]    Brief Operative Note    Pre-operative diagnosis: History of colitis [Z87.19]  Post-operative diagnosis Normal colon     Procedure: Procedure(s):  diagnostic colonoscopy with biopsy  Surgeon: Surgeon(s) and Role:     * Bong Olson MD - Primary  Anesthesia: Monitor Anesthesia Care   Estimated blood loss: Minimal  Drains: None  Specimens:   ID Type Source Tests Collected by Time Destination   A :  Biopsy Large Intestine, Cecum SURGICAL PATHOLOGY EXAM Bong Olson MD 9/24/2020  1:19 PM      Findings:   Mild inflammation in cecum, normal appearing terminal ileum .  Complications: None.  Implants: * No implants in log *

## 2020-09-24 NOTE — DISCHARGE INSTRUCTIONS
Post-Anesthesia Patient Instructions    IMMEDIATELY FOLLOWING SURGERY:  Do not drive or operate machinery for the first twenty four hours after surgery.  Do not make any important decisions for twenty four hours after surgery or while taking narcotic pain medications or sedatives.  If you develop intractable nausea and vomiting or a severe headache please notify your doctor immediately.    FOLLOW-UP:  Please make an appointment with your surgeon as instructed. You do not need to follow up with anesthesia unless specifically instructed to do so.    WOUND CARE INSTRUCTIONS (if applicable):  Keep a dry clean dressing on the anesthesia/puncture wound site if there is drainage.  Once the wound has quit draining you may leave it open to air.  Generally you should leave the bandage intact for twenty four hours unless there is drainage.  If the epidural site drains for more than 36-48 hours please call the anesthesia department.    QUESTIONS?:  Please feel free to call your physician or the hospital  if you have any questions, and they will be happy to assist you.             INSTRUCTIONS AFTER COLONOSCOPY    WHEN YOU ARE BACK HOME:    Plan to rest for an hour or two after you get home.    You may have some cramping or pressure until you pass gas.    You may resume your regular medications.    Eat a small, light meal at first, and then gradually return to normal meal sizes.    You will be contacted the next day to see how you are doing.  If you had a polyp removed:    Slight bleeding may occur.  You may have a slight blood stain on the toilet paper after a bowel movement.    To lessen the chance of bleeding, avoid heavy exercise for ONE WEEK.  This includes heavy lifting, vigorous sport activities, and heavy physical labor.  You may resume your normal sexual activity.      Avoid aspirin or aspirin products if instructed by your doctor.    If there is a polyp or biopsy, you will be contacted with results within  one week.     WHAT TO WATCH FOR:  Problems rarely occur after the exam; however, it is important for you to watch for early signs of possible problems.  If you have     Unusual pain in your abdomen    Nausea and vomiting that persists    Excessive bleeding    Black or bloody bowel movements    Fever or temperature above 100.6 F  Please call your doctor ([facility] [phone]) or go to the nearest hospital emergency room.

## 2020-09-24 NOTE — OR NURSING
The patient is tolerating drinking oral fluids and eating cookies without nausea or discomfort. Discharge instructions given to daughter Daphney by telephone, Daphney states she will  the patient around 1440 (waiting for kids to get off the school bus). The patient is aware of the discharge plan and instructions with verbal understanding stated.

## 2020-09-24 NOTE — ANESTHESIA CARE TRANSFER NOTE
Patient: Liza Goins    Procedure(s):  diagnostic colonoscopy with biopsy    Diagnosis: History of colitis [Z87.19]  Diagnosis Additional Information: No value filed.    Anesthesia Type:   MAC     Note:  Airway :Nasal Cannula  Patient transferred to:Phase II  Handoff Report: Identifed the Patient, Identified the Reponsible Provider, Reviewed the pertinent medical history, Discussed the surgical course, Reviewed Intra-OP anesthesia mangement and issues during anesthesia, Set expectations for post-procedure period and Allowed opportunity for questions and acknowledgement of understanding      Vitals: (Last set prior to Anesthesia Care Transfer)    CRNA VITALS  9/24/2020 1258 - 9/24/2020 1331      9/24/2020             Resp Rate (set):  8                Electronically Signed By: MARTY Tellez CRNA  September 24, 2020  1:31 PM

## 2020-09-25 LAB — COPATH REPORT: NORMAL

## 2020-09-29 ENCOUNTER — TELEPHONE (OUTPATIENT)
Dept: SURGERY | Facility: OTHER | Age: 50
End: 2020-09-29

## 2020-09-29 NOTE — TELEPHONE ENCOUNTER
Patient called in and stated that she would like her results please call patient back at [phone]

## 2021-06-21 ENCOUNTER — HOSPITAL ENCOUNTER (EMERGENCY)
Facility: HOSPITAL | Age: 51
Discharge: HOME | End: 2021-06-21
Attending: PHYSICIAN ASSISTANT | Admitting: PHYSICIAN ASSISTANT
Payer: COMMERCIAL

## 2021-06-21 VITALS
OXYGEN SATURATION: 98 % | SYSTOLIC BLOOD PRESSURE: 157 MMHG | DIASTOLIC BLOOD PRESSURE: 94 MMHG | RESPIRATION RATE: 16 BRPM | HEART RATE: 73 BPM | TEMPERATURE: 98.5 F

## 2021-06-21 DIAGNOSIS — R07.89 SENSATION OF CHEST TIGHTNESS: ICD-10-CM

## 2021-06-21 DIAGNOSIS — H66.90 ACUTE OTITIS MEDIA: ICD-10-CM

## 2021-06-21 LAB
ANION GAP SERPL CALCULATED.3IONS-SCNC: 3 MMOL/L (ref 3–14)
BASOPHILS # BLD AUTO: 0.1 10E9/L (ref 0–0.2)
BASOPHILS NFR BLD AUTO: 0.7 %
BUN SERPL-MCNC: 12 MG/DL (ref 7–30)
CALCIUM SERPL-MCNC: 8.5 MG/DL (ref 8.5–10.1)
CHLORIDE SERPL-SCNC: 108 MMOL/L (ref 94–109)
CO2 SERPL-SCNC: 28 MMOL/L (ref 20–32)
CREAT SERPL-MCNC: 0.71 MG/DL (ref 0.52–1.04)
DIFFERENTIAL METHOD BLD: NORMAL
EOSINOPHIL # BLD AUTO: 0.3 10E9/L (ref 0–0.7)
EOSINOPHIL NFR BLD AUTO: 3.4 %
ERYTHROCYTE [DISTWIDTH] IN BLOOD BY AUTOMATED COUNT: 12.4 % (ref 10–15)
GFR SERPL CREATININE-BSD FRML MDRD: >90 ML/MIN/{1.73_M2}
GLUCOSE SERPL-MCNC: 114 MG/DL (ref 70–99)
HCT VFR BLD AUTO: 43.5 % (ref 35–47)
HGB BLD-MCNC: 14.5 G/DL (ref 11.7–15.7)
IMM GRANULOCYTES # BLD: 0 10E9/L (ref 0–0.4)
IMM GRANULOCYTES NFR BLD: 0.4 %
LYMPHOCYTES # BLD AUTO: 3.3 10E9/L (ref 0.8–5.3)
LYMPHOCYTES NFR BLD AUTO: 34.8 %
MCH RBC QN AUTO: 30.3 PG (ref 26.5–33)
MCHC RBC AUTO-ENTMCNC: 33.3 G/DL (ref 31.5–36.5)
MCV RBC AUTO: 91 FL (ref 78–100)
MONOCYTES # BLD AUTO: 0.7 10E9/L (ref 0–1.3)
MONOCYTES NFR BLD AUTO: 7.7 %
NEUTROPHILS # BLD AUTO: 5 10E9/L (ref 1.6–8.3)
NEUTROPHILS NFR BLD AUTO: 53 %
NRBC # BLD AUTO: 0 10*3/UL
NRBC BLD AUTO-RTO: 0 /100
PLATELET # BLD AUTO: 244 10E9/L (ref 150–450)
POTASSIUM SERPL-SCNC: 3.8 MMOL/L (ref 3.4–5.3)
RBC # BLD AUTO: 4.78 10E12/L (ref 3.8–5.2)
SODIUM SERPL-SCNC: 139 MMOL/L (ref 133–144)
TROPONIN I SERPL-MCNC: <0.015 UG/L (ref 0–0.04)
WBC # BLD AUTO: 9.4 10E9/L (ref 4–11)

## 2021-06-21 PROCEDURE — 80048 BASIC METABOLIC PNL TOTAL CA: CPT | Performed by: PHYSICIAN ASSISTANT

## 2021-06-21 PROCEDURE — 93010 ELECTROCARDIOGRAM REPORT: CPT | Performed by: INTERNAL MEDICINE

## 2021-06-21 PROCEDURE — 99284 EMERGENCY DEPT VISIT MOD MDM: CPT

## 2021-06-21 PROCEDURE — 36415 COLL VENOUS BLD VENIPUNCTURE: CPT | Performed by: PHYSICIAN ASSISTANT

## 2021-06-21 PROCEDURE — 93005 ELECTROCARDIOGRAM TRACING: CPT

## 2021-06-21 PROCEDURE — 84484 ASSAY OF TROPONIN QUANT: CPT | Performed by: PHYSICIAN ASSISTANT

## 2021-06-21 PROCEDURE — 85025 COMPLETE CBC W/AUTO DIFF WBC: CPT | Performed by: PHYSICIAN ASSISTANT

## 2021-06-21 PROCEDURE — 99284 EMERGENCY DEPT VISIT MOD MDM: CPT | Performed by: PHYSICIAN ASSISTANT

## 2021-06-21 RX ORDER — ALBUTEROL SULFATE 90 UG/1
1-2 AEROSOL, METERED RESPIRATORY (INHALATION)
COMMUNITY
Start: 2021-01-20

## 2021-06-21 RX ORDER — LORATADINE 10 MG/1
10 TABLET ORAL
COMMUNITY
Start: 2021-01-06 | End: 2023-03-21

## 2021-06-21 ASSESSMENT — ENCOUNTER SYMPTOMS
ABDOMINAL PAIN: 0
TROUBLE SWALLOWING: 0
BRUISES/BLEEDS EASILY: 0
ACTIVITY CHANGE: 0
NAUSEA: 0
RHINORRHEA: 0
SPEECH DIFFICULTY: 0
DIZZINESS: 1
WEAKNESS: 0
DIARRHEA: 0
CHILLS: 0
NUMBNESS: 0
SHORTNESS OF BREATH: 0
FATIGUE: 0
LIGHT-HEADEDNESS: 0
NECK STIFFNESS: 0
FEVER: 0
SINUS PRESSURE: 0
APPETITE CHANGE: 0
DIAPHORESIS: 0
HEADACHES: 0
BACK PAIN: 0
NECK PAIN: 0

## 2021-06-22 NOTE — DISCHARGE INSTRUCTIONS
Your work-up in the emergency department is unchanged from previous findings.  Please start Augmentin as prescribed.  Please follow-up in the clinic for repeat next week.  Please return here for any return of chest symptoms, shortness of breath, fevers, chills or other concerns.

## 2021-06-22 NOTE — ED PROVIDER NOTES
History     Chief Complaint   Patient presents with     Otalgia     Chest Wall Pain     Dizziness     The history is provided by the patient.     Liza Goins is a 51 year old female who presented to the emergency department ambulatory for evaluation of left ear fullness and pain as well as intermittent episodes of brief dizziness.  The dizziness is only with position change and head movement.  Ear fullness began this morning while taking a shower.  She told the triage nurse that she had an episode of chest tightness yesterday.  She denied any dyspnea or diaphoresis.  She has had similar episodes in the past.  Primary care is in the [facility].  Denies any chest tightness or similar concerns at this time.  She only complains of left ear pain.    Allergies:  Allergies   Allergen Reactions     Latex Itching       Problem List:    Patient Active Problem List    Diagnosis Date Noted     History of colitis 09/10/2020     Priority: Medium     Added automatically from request for surgery [id number]          Past Medical History:    No past medical history on file.    Past Surgical History:    Past Surgical History:   Procedure Laterality Date     COLONOSCOPY N/A 9/24/2020    Procedure: diagnostic colonoscopy with biopsy;  Surgeon: Bong Olson MD;  Location: [facility] OR       Family History:    No family history on file.    Social History:  Marital Status:  Single [1]  Social History     Tobacco Use     Smoking status: Not on file   Substance Use Topics     Alcohol use: Not on file     Drug use: Not on file        Medications:    amoxicillin-clavulanate (AUGMENTIN) 875-125 MG tablet  albuterol (PROAIR HFA/PROVENTIL HFA/VENTOLIN HFA) 108 (90 Base) MCG/ACT inhaler  cholecalciferol (VITAMIN D3) 25 mcg (1000 units) capsule  loratadine (CLARITIN) 10 MG tablet  tamoxifen (NOLVADEX) 10 MG tablet          Review of Systems   Constitutional: Negative for activity change, appetite change, chills, diaphoresis, fatigue and fever.    HENT: Positive for ear pain. Negative for ear discharge, rhinorrhea, sinus pressure, tinnitus and trouble swallowing.    Respiratory: Negative for shortness of breath.         See HPI.  Denies at this time.   Cardiovascular:        See HPI.  Denies at this time.   Gastrointestinal: Negative for abdominal pain, diarrhea and nausea.   Genitourinary: Negative.    Musculoskeletal: Negative for back pain, neck pain and neck stiffness.   Skin: Negative.    Allergic/Immunologic: Negative for immunocompromised state.   Neurological: Positive for dizziness. Negative for speech difficulty, weakness, light-headedness, numbness and headaches.        Brief episodes of mild dizziness with position change and head movement.  Denies any dizziness at this time.  Denies any headaches.   Hematological: Does not bruise/bleed easily.   Psychiatric/Behavioral:        Patient became tearful when discussing the chest tightness.       Physical Exam   BP: 157/94  Pulse: 73  Temp: 98.5  F (36.9  C)  Resp: 16  SpO2: 98 %      Physical Exam  Vitals signs and nursing note reviewed.   Constitutional:       General: She is not in acute distress.     Appearance: Normal appearance. She is not ill-appearing, toxic-appearing or diaphoretic.      Comments: Pleasant and talkative 51-year-old female found seated upright on the exam bed in no distress.   HENT:      Head: Normocephalic and atraumatic.      Right Ear: Tympanic membrane, ear canal and external ear normal.      Ears:      Comments: Examination of the left tympanic membrane shows erythema with bulging as well as loss of landmarks.  The patient does have a discharge in the canal as well as purulent attachment to the anterior portion of the membrane.  Mastoid is negative.     Nose: Nose normal.      Mouth/Throat:      Mouth: Mucous membranes are moist.      Pharynx: Oropharynx is clear.   Eyes:      Extraocular Movements: Extraocular movements intact.      Conjunctiva/sclera: Conjunctivae  normal.      Pupils: Pupils are equal, round, and reactive to light.   Neck:      Musculoskeletal: Normal range of motion and neck supple. No neck rigidity.   Cardiovascular:      Rate and Rhythm: Normal rate and regular rhythm.   Pulmonary:      Effort: Pulmonary effort is normal.      Breath sounds: Normal breath sounds.   Lymphadenopathy:      Cervical: No cervical adenopathy.   Skin:     General: Skin is warm and dry.      Capillary Refill: Capillary refill takes less than 2 seconds.   Neurological:      General: No focal deficit present.      Mental Status: She is alert and oriented to person, place, and time.      Comments: Cranial nerve examination: revealed that for cranial nerve   II: the pupils were reactive and the visual field were full  III, IV, and VI, the extraocular movements were full.    V: facial sensation intact bilateral   VII: facial movements are symmetric  VIII: hearing intact to voice  IX & X: the soft palate rises symmetrically   XI: shoulder movements are symmetric  XII: tongue is midline    Neurological examination:  That the patient was awake and alert, the attention, orientation, concentration, language, memory and fund of knowledge were all normal.  The patient had no neglect or apraxia.    Normal gait  Normal speech  Normal finger-to-nose   Psychiatric:         Mood and Affect: Mood normal.         ED Course        Procedures          EKG shows sinus bradycardia at a rate of 59.  Normal NC interval.  Normal QRS duration.  Normal QTC.  Normal axis.  Normal P wave duration.  There are no concerning ST segments.  There are no concerning T waves.  There is no evidence of ectopy, preexcitation, or ischemia.  Comparison to dictated EKGs shows no appreciable change.     Critical Care time:  none               Results for orders placed or performed during the hospital encounter of 06/21/21 (from the past 24 hour(s))   CBC with platelets differential   Result Value Ref Range    WBC 9.4 4.0 -  11.0 10e9/L    RBC Count 4.78 3.8 - 5.2 10e12/L    Hemoglobin 14.5 11.7 - 15.7 g/dL    Hematocrit 43.5 35.0 - 47.0 %    MCV 91 78 - 100 fl    MCH 30.3 26.5 - 33.0 pg    MCHC 33.3 31.5 - 36.5 g/dL    RDW 12.4 10.0 - 15.0 %    Platelet Count 244 150 - 450 10e9/L    Diff Method Automated Method     % Neutrophils 53.0 %    % Lymphocytes 34.8 %    % Monocytes 7.7 %    % Eosinophils 3.4 %    % Basophils 0.7 %    % Immature Granulocytes 0.4 %    Nucleated RBCs 0 0 /100    Absolute Neutrophil 5.0 1.6 - 8.3 10e9/L    Absolute Lymphocytes 3.3 0.8 - 5.3 10e9/L    Absolute Monocytes 0.7 0.0 - 1.3 10e9/L    Absolute Eosinophils 0.3 0.0 - 0.7 10e9/L    Absolute Basophils 0.1 0.0 - 0.2 10e9/L    Abs Immature Granulocytes 0.0 0 - 0.4 10e9/L    Absolute Nucleated RBC 0.0    Basic metabolic panel   Result Value Ref Range    Sodium 139 133 - 144 mmol/L    Potassium 3.8 3.4 - 5.3 mmol/L    Chloride 108 94 - 109 mmol/L    Carbon Dioxide 28 20 - 32 mmol/L    Anion Gap 3 3 - 14 mmol/L    Glucose 114 (H) 70 - 99 mg/dL    Urea Nitrogen 12 7 - 30 mg/dL    Creatinine 0.71 0.52 - 1.04 mg/dL    GFR Estimate >90 >60 mL/min/[1.73_m2]    GFR Estimate If Black >90 >60 mL/min/[1.73_m2]    Calcium 8.5 8.5 - 10.1 mg/dL   Troponin I   Result Value Ref Range    Troponin I ES <0.015 0.000 - 0.045 ug/L       Medications - No data to display    Assessments & Plan (with Medical Decision Making)   Findings as above.  Given the chest tightness that was nondescript yesterday we did elect to go forward with a work-up in the emergency department.  EKG is unchanged and shows no evidence of acute infarct or ischemia.  Laboratory evaluation including troponin is unremarkable.  She does have a left acute otitis media.  This will be treated with Augmentin.  Close follow-up in the clinic.  Return here for any return of symptoms, new symptoms, or other concerns.  HPI, exam, symptoms, and work-up is not consistent with acute coronary syndrome, pulmonary embolism,  pneumothorax, thoracic aortic dissection, or other intrathoracic catastrophe.  Her dizziness is also nondescript and this seems benign.  Not consistent with vertebrobasilar insufficiency or cerebellar infarct.  Neurologic exam is entirely unremarkable.    This document was prepared using a combination of typing and voice generated software.  While every attempt was made for accuracy, spelling and grammatical errors may exist.    I have reviewed the nursing notes.    I have reviewed the findings, diagnosis, plan and need for follow up with the patient.          Discharge Medication List as of 6/21/2021  8:41 PM      START taking these medications    Details   amoxicillin-clavulanate (AUGMENTIN) 875-125 MG tablet Take 1 tablet by mouth 2 times daily, Disp-20 tablet, R-0, [facility]             Final diagnoses:   Acute otitis media   Sensation of chest tightness - yesterday       6/21/2021   [facility] EM[facility]     Gavin Rosales PA-C  06/21/21 205

## 2021-06-22 NOTE — ED TRIAGE NOTES
Pt states yesterday she was having the feelings of chest tightness starting at 1730 and lasted until 2300. Today pt c/o left ear feels plugged and 2 brief episodes of dizziness.

## 2021-06-22 NOTE — ED NOTES
States she was at work at [employer] last night and developed chest tightness that increased with upper body movement. Denies injury.  States it was there at 2300 off and on when she went to bed and when she woke this morning, tightness was gone and has not returned today. States that after she had taken a shower this morning, she used a Q-tip in both ears and when she went to put Q-tip in left ear, she noticed it was plugged. She also states she has had 2 dizzy spells after she noticed that her ear was plugged. States she did try placing ear drops in left ear, but drops ran out of her ear.  States she did call primary office today and was told to come here.

## 2021-08-19 ENCOUNTER — HOSPITAL ENCOUNTER (EMERGENCY)
Facility: HOSPITAL | Age: 51
Discharge: LEFT BEFORE DISCHARGE | End: 2021-08-19
Admitting: STUDENT IN AN ORGANIZED HEALTH CARE EDUCATION/TRAINING PROGRAM
Payer: COMMERCIAL

## 2021-08-19 VITALS
RESPIRATION RATE: 16 BRPM | HEART RATE: 88 BPM | DIASTOLIC BLOOD PRESSURE: 96 MMHG | SYSTOLIC BLOOD PRESSURE: 153 MMHG | OXYGEN SATURATION: 97 % | TEMPERATURE: 99 F

## 2021-08-19 PROCEDURE — 999N000104 HC STATISTIC NO CHARGE

## 2021-10-14 ENCOUNTER — OFFICE VISIT (OUTPATIENT)
Dept: FAMILY MEDICINE | Facility: OTHER | Age: 51
End: 2021-10-14
Attending: NURSE PRACTITIONER
Payer: COMMERCIAL

## 2021-10-14 VITALS
WEIGHT: 285 LBS | TEMPERATURE: 98.4 F | DIASTOLIC BLOOD PRESSURE: 88 MMHG | OXYGEN SATURATION: 96 % | HEIGHT: 65 IN | SYSTOLIC BLOOD PRESSURE: 128 MMHG | BODY MASS INDEX: 47.48 KG/M2 | HEART RATE: 70 BPM

## 2021-10-14 DIAGNOSIS — H93.8X3: ICD-10-CM

## 2021-10-14 DIAGNOSIS — H60.393 INFECTIVE OTITIS EXTERNA, BILATERAL: Primary | ICD-10-CM

## 2021-10-14 PROBLEM — G89.29 CHRONIC NECK AND BACK PAIN: Status: ACTIVE | Noted: 2019-08-23

## 2021-10-14 PROBLEM — M54.9 CHRONIC NECK AND BACK PAIN: Status: ACTIVE | Noted: 2019-08-23

## 2021-10-14 PROBLEM — J45.20 MILD INTERMITTENT ASTHMA: Status: ACTIVE | Noted: 2017-10-02

## 2021-10-14 PROBLEM — F32.A DEPRESSION: Status: ACTIVE | Noted: 2019-08-23

## 2021-10-14 PROBLEM — F90.0 ADHD, PREDOMINANTLY INATTENTIVE TYPE: Status: ACTIVE | Noted: 2020-06-18

## 2021-10-14 PROBLEM — C50.912 MALIGNANT NEOPLASM OF LEFT BREAST (H): Status: ACTIVE | Noted: 2019-01-08

## 2021-10-14 PROBLEM — M54.2 CHRONIC NECK AND BACK PAIN: Status: ACTIVE | Noted: 2019-08-23

## 2021-10-14 PROCEDURE — G0463 HOSPITAL OUTPT CLINIC VISIT: HCPCS

## 2021-10-14 PROCEDURE — 99203 OFFICE O/P NEW LOW 30 MIN: CPT | Performed by: NURSE PRACTITIONER

## 2021-10-14 RX ORDER — CLINDAMYCIN PHOSPHATE 10 UG/ML
LOTION TOPICAL
COMMUNITY
Start: 2021-02-18 | End: 2023-03-21

## 2021-10-14 RX ORDER — KETOCONAZOLE 20 MG/ML
SHAMPOO TOPICAL
COMMUNITY
Start: 2020-07-13 | End: 2023-03-21

## 2021-10-14 RX ORDER — METHOCARBAMOL 750 MG/1
750 TABLET, FILM COATED ORAL
COMMUNITY
Start: 2020-06-01

## 2021-10-14 RX ORDER — BENZOYL PEROXIDE 2.5 G/100G
GEL TOPICAL
COMMUNITY
Start: 2021-06-25 | End: 2023-03-21

## 2021-10-14 RX ORDER — MELOXICAM 15 MG/1
TABLET ORAL
COMMUNITY
Start: 2020-06-12

## 2021-10-14 ASSESSMENT — PAIN SCALES - GENERAL: PAINLEVEL: MILD PAIN (3)

## 2021-10-14 ASSESSMENT — MIFFLIN-ST. JEOR: SCORE: 1913.63

## 2021-10-14 NOTE — PROGRESS NOTES
"    Assessment & Plan   1. Infective otitis externa, bilateral  - ciprofloxacin-hydrocortisone (CIPRO HC OTIC) 0.2-1 % otic suspension; Place 3 drops into both ears 2 times daily for 10 days  Dispense: 3 mL; Refill: 0  - Otolaryngology Referral    2. Mass of ear canal, bilateral  - ciprofloxacin-hydrocortisone (CIPRO HC OTIC) 0.2-1 % otic suspension; Place 3 drops into both ears 2 times daily for 10 days  Dispense: 3 mL; Refill: 0  - Otolaryngology Referral      Prescription drug management     BMI:   Estimated body mass index is 47.43 kg/m  as calculated from the following:    Height as of this encounter: 1.651 m (5' 5\").    Weight as of this encounter: 129.3 kg (285 lb).   Weight management plan: Discussed healthy diet and exercise guidelines      No follow-ups on file.    Yanet Britton, [facility]    Subjective   Liza is a 51 year old who presents for the following health issues     HPI     Concern - Ear itching  Onset: 1.5 years ago  Description: ear itching, was instructed to see ENT however has not   Intensity: mild  Progression of Symptoms:  intermittent  Accompanying Signs & Symptoms: itching, clear drainage   Previous history of similar problem: on going   Precipitating factors:        Worsened by:  none  Alleviating factors:        Improved by: none  Therapies tried and outcome: Used otic drops prescription but not sure what they were. This was over 6 months ago. Has not used anything recently        Review of Systems   Constitutional, HEENT, cardiovascular, pulmonary, gi and gu systems are negative, except as otherwise noted.    Patient Active Problem List   Diagnosis     History of colitis     ADHD, predominantly inattentive type     Asthma     Depression     Chronic neck and back pain     Family history of uterine cancer     Malignant neoplasm of left breast (H)     Mild intermittent asthma     Past Surgical History:   Procedure Laterality Date     COLONOSCOPY N/A " "9/24/2020    Procedure: diagnostic colonoscopy with biopsy;  Surgeon: Bong Olson MD;  Location: [facility] OR       Social History     Tobacco Use     Smoking status: Former Smoker     Smokeless tobacco: Never Used   Substance Use Topics     Alcohol use: Not on file     History reviewed. No pertinent family history.          Allergies   Allergen Reactions     Latex Itching, Hives and Rash                         Objective    /88 (BP Location: Right arm, Patient Position: Chair, Cuff Size: Adult Large)   Pulse 70   Temp 98.4  F (36.9  C) (Tympanic)   Ht 1.651 m (5' 5\")   Wt 129.3 kg (285 lb)   SpO2 96%   BMI 47.43 kg/m    Body mass index is 47.43 kg/m .     Physical Exam   GENERAL: healthy, alert and no distress  HENT: normal cephalic/atraumatic, both ears: purulent drainage in canal and cyst-like masses to both ear canals R>L.  nose and mouth without ulcers or lesions, oropharynx clear and oral mucous membranes moist          "

## 2021-10-14 NOTE — NURSING NOTE
"Chief Complaint   Patient presents with     Ear Problem       Initial /88 (BP Location: Right arm, Patient Position: Chair, Cuff Size: Adult Large)   Pulse 70   Temp 98.4  F (36.9  C) (Tympanic)   Ht 1.651 m (5' 5\")   Wt 129.3 kg (285 lb)   SpO2 96%   BMI 47.43 kg/m   Estimated body mass index is 47.43 kg/m  as calculated from the following:    Height as of this encounter: 1.651 m (5' 5\").    Weight as of this encounter: 129.3 kg (285 lb).  Medication Reconciliation: complete  Gillian Villalobos LPN      "

## 2021-10-14 NOTE — PATIENT INSTRUCTIONS
Patient Education     External Ear Infection (Adult)    External otitis (also called  swimmer s ear ) is an infection in the ear canal. It's often caused by bacteria or fungus. It can occur a few days after water gets trapped in the ear canal (from swimming or bathing). It can also occur after cleaning too deeply in the ear canal with a cotton swab or other object. Sometimes, hair care products get into the ear canal and cause this problem.   Symptoms can include pain, fever, itching, redness, drainage, or swelling of the ear canal. Temporary hearing loss may also occur.   Home care    Don't try to clean the ear canal. This can push pus and bacteria deeper into the canal.    Use prescribed ear drops as directed. These help reduce swelling and fight the infection. If an ear wick was placed in the ear canal, apply drops right onto the end of the wick. The wick will draw the medicine into the ear canal even if it's swollen closed.    A cotton ball may be loosely placed in the outer ear to absorb any drainage.    You may use over-the-counter medicines to control pain as directed by the healthcare provider, unless another medicine was prescribed. Talk with your provider before using these medicines if you have chronic liver or kidney disease or ever had a stomach ulcer or digestive tract bleeding.    Don't allow water to get into your ear when bathing. Also don't swim until the infection has cleared.    Prevention    Keep your ears dry. This helps lower the risk of infection. Dry your ears with a towel or hair dryer after getting wet. Also, use ear plugs when swimming.    Don't stick any objects in the ear to remove wax.    Talk with your provider about using ear drops to prevent swimmer's ear in case you feel water trapped in your ear canal. You can get these drops over the counter at most drugstores. They work by removing water from the ear canal.    Follow-up care  Follow up with your healthcare provider in 1 week,  or as advised.   When to seek medical advice  Call your healthcare provider right away if any of these occur:     Ear pain becomes worse or doesn t improve after 3 days of treatment    Redness or swelling of the outer ear occurs or gets worse    Headache    Fever of 100.4 F (38 C) or higher, or as directed by your healthcare provider  Call 911  Call 911 or get immediate medical care if any of the following occur:     Seizure    Unusual drowsiness or confusion    Unusual painful or stiff neck    Sarika last reviewed this educational content on 8/1/2020 2000-2021 [employer]. All rights reserved. This information is not intended as a substitute for professional medical care. Always follow your healthcare professional's instructions.

## 2021-10-15 ENCOUNTER — TELEPHONE (OUTPATIENT)
Dept: OTOLARYNGOLOGY | Facility: OTHER | Age: 51
End: 2021-10-15

## 2021-10-18 ENCOUNTER — TELEPHONE (OUTPATIENT)
Dept: OTOLARYNGOLOGY | Facility: OTHER | Age: 51
End: 2021-10-18

## 2021-10-27 DIAGNOSIS — H91.93 DECREASED HEARING OF BOTH EARS: Primary | ICD-10-CM

## 2021-11-17 ENCOUNTER — NURSE TRIAGE (OUTPATIENT)
Dept: FAMILY MEDICINE | Facility: OTHER | Age: 51
End: 2021-11-17
Payer: COMMERCIAL

## 2021-11-17 ENCOUNTER — HOSPITAL ENCOUNTER (EMERGENCY)
Facility: HOSPITAL | Age: 51
Discharge: HOME | End: 2021-11-17
Attending: EMERGENCY MEDICINE | Admitting: EMERGENCY MEDICINE
Payer: COMMERCIAL

## 2021-11-17 ENCOUNTER — APPOINTMENT (OUTPATIENT)
Dept: CT IMAGING | Facility: HOSPITAL | Age: 51
End: 2021-11-17
Attending: EMERGENCY MEDICINE
Payer: COMMERCIAL

## 2021-11-17 VITALS
OXYGEN SATURATION: 97 % | SYSTOLIC BLOOD PRESSURE: 172 MMHG | HEART RATE: 67 BPM | DIASTOLIC BLOOD PRESSURE: 78 MMHG | TEMPERATURE: 98.7 F | RESPIRATION RATE: 18 BRPM

## 2021-11-17 DIAGNOSIS — K52.9 GASTROENTERITIS: ICD-10-CM

## 2021-11-17 DIAGNOSIS — R11.0 NAUSEA: ICD-10-CM

## 2021-11-17 DIAGNOSIS — R10.30 LOWER ABDOMINAL PAIN: ICD-10-CM

## 2021-11-17 LAB
ALBUMIN SERPL-MCNC: 3.4 G/DL (ref 3.4–5)
ALBUMIN UR-MCNC: NEGATIVE MG/DL
ALP SERPL-CCNC: 86 U/L (ref 40–150)
ALT SERPL W P-5'-P-CCNC: 22 U/L (ref 0–50)
ANION GAP SERPL CALCULATED.3IONS-SCNC: 4 MMOL/L (ref 3–14)
APPEARANCE UR: CLEAR
AST SERPL W P-5'-P-CCNC: 12 U/L (ref 0–45)
BASOPHILS # BLD AUTO: 0 10E3/UL (ref 0–0.2)
BASOPHILS NFR BLD AUTO: 1 %
BILIRUB SERPL-MCNC: 0.5 MG/DL (ref 0.2–1.3)
BILIRUB UR QL STRIP: NEGATIVE
BUN SERPL-MCNC: 7 MG/DL (ref 7–30)
CALCIUM SERPL-MCNC: 9.1 MG/DL (ref 8.5–10.1)
CHLORIDE BLD-SCNC: 106 MMOL/L (ref 94–109)
CO2 SERPL-SCNC: 28 MMOL/L (ref 20–32)
COLOR UR AUTO: ABNORMAL
CREAT SERPL-MCNC: 0.74 MG/DL (ref 0.52–1.04)
EOSINOPHIL # BLD AUTO: 0.2 10E3/UL (ref 0–0.7)
EOSINOPHIL NFR BLD AUTO: 3 %
ERYTHROCYTE [DISTWIDTH] IN BLOOD BY AUTOMATED COUNT: 12.4 % (ref 10–15)
GFR SERPL CREATININE-BSD FRML MDRD: >90 ML/MIN/1.73M2
GLUCOSE BLD-MCNC: 90 MG/DL (ref 70–99)
GLUCOSE UR STRIP-MCNC: NEGATIVE MG/DL
HCG UR QL: NEGATIVE
HCT VFR BLD AUTO: 43.3 % (ref 35–47)
HGB BLD-MCNC: 14.5 G/DL (ref 11.7–15.7)
HGB UR QL STRIP: NEGATIVE
IMM GRANULOCYTES # BLD: 0 10E3/UL
IMM GRANULOCYTES NFR BLD: 0 %
KETONES UR STRIP-MCNC: NEGATIVE MG/DL
LEUKOCYTE ESTERASE UR QL STRIP: NEGATIVE
LIPASE SERPL-CCNC: 111 U/L (ref 73–393)
LYMPHOCYTES # BLD AUTO: 2.6 10E3/UL (ref 0.8–5.3)
LYMPHOCYTES NFR BLD AUTO: 30 %
MCH RBC QN AUTO: 29.8 PG (ref 26.5–33)
MCHC RBC AUTO-ENTMCNC: 33.5 G/DL (ref 31.5–36.5)
MCV RBC AUTO: 89 FL (ref 78–100)
MONOCYTES # BLD AUTO: 0.7 10E3/UL (ref 0–1.3)
MONOCYTES NFR BLD AUTO: 9 %
MUCOUS THREADS #/AREA URNS LPF: PRESENT /LPF
NEUTROPHILS # BLD AUTO: 4.9 10E3/UL (ref 1.6–8.3)
NEUTROPHILS NFR BLD AUTO: 57 %
NITRATE UR QL: NEGATIVE
NRBC # BLD AUTO: 0 10E3/UL
NRBC BLD AUTO-RTO: 0 /100
PH UR STRIP: 5 [PH] (ref 4.7–8)
PLATELET # BLD AUTO: 248 10E3/UL (ref 150–450)
POTASSIUM BLD-SCNC: 4 MMOL/L (ref 3.4–5.3)
PROT SERPL-MCNC: 7.4 G/DL (ref 6.8–8.8)
RBC # BLD AUTO: 4.86 10E6/UL (ref 3.8–5.2)
RBC URINE: 1 /HPF
SODIUM SERPL-SCNC: 138 MMOL/L (ref 133–144)
SP GR UR STRIP: 1.01 (ref 1–1.03)
SQUAMOUS EPITHELIAL: 2 /HPF
UROBILINOGEN UR STRIP-MCNC: NORMAL MG/DL
WBC # BLD AUTO: 8.5 10E3/UL (ref 4–11)
WBC URINE: 1 /HPF

## 2021-11-17 PROCEDURE — 81025 URINE PREGNANCY TEST: CPT | Performed by: EMERGENCY MEDICINE

## 2021-11-17 PROCEDURE — 250N000011 HC RX IP 250 OP 636: Performed by: EMERGENCY MEDICINE

## 2021-11-17 PROCEDURE — 83690 ASSAY OF LIPASE: CPT | Performed by: EMERGENCY MEDICINE

## 2021-11-17 PROCEDURE — 99285 EMERGENCY DEPT VISIT HI MDM: CPT | Mod: 25

## 2021-11-17 PROCEDURE — 36415 COLL VENOUS BLD VENIPUNCTURE: CPT | Performed by: EMERGENCY MEDICINE

## 2021-11-17 PROCEDURE — 85025 COMPLETE CBC W/AUTO DIFF WBC: CPT | Performed by: EMERGENCY MEDICINE

## 2021-11-17 PROCEDURE — 81003 URINALYSIS AUTO W/O SCOPE: CPT | Performed by: EMERGENCY MEDICINE

## 2021-11-17 PROCEDURE — 74177 CT ABD & PELVIS W/CONTRAST: CPT

## 2021-11-17 PROCEDURE — 99284 EMERGENCY DEPT VISIT MOD MDM: CPT | Performed by: EMERGENCY MEDICINE

## 2021-11-17 PROCEDURE — 80053 COMPREHEN METABOLIC PANEL: CPT | Performed by: EMERGENCY MEDICINE

## 2021-11-17 PROCEDURE — 250N000011 HC RX IP 250 OP 636: Performed by: RADIOLOGY

## 2021-11-17 RX ORDER — ONDANSETRON 4 MG/1
4 TABLET, ORALLY DISINTEGRATING ORAL ONCE
Status: COMPLETED | OUTPATIENT
Start: 2021-11-17 | End: 2021-11-17

## 2021-11-17 RX ORDER — IBUPROFEN 600 MG/1
600 TABLET, FILM COATED ORAL ONCE
Status: DISCONTINUED | OUTPATIENT
Start: 2021-11-17 | End: 2021-11-17 | Stop reason: HOSPADM

## 2021-11-17 RX ORDER — ALUMINA, MAGNESIA, AND SIMETHICONE 2400; 2400; 240 MG/30ML; MG/30ML; MG/30ML
30 SUSPENSION ORAL EVERY 4 HOURS PRN
Qty: 355 ML | Refills: 0 | Status: SHIPPED | OUTPATIENT
Start: 2021-11-17

## 2021-11-17 RX ORDER — ONDANSETRON 4 MG/1
4 TABLET, ORALLY DISINTEGRATING ORAL EVERY 8 HOURS PRN
Qty: 15 TABLET | Refills: 0 | Status: SHIPPED | OUTPATIENT
Start: 2021-11-17 | End: 2023-03-21

## 2021-11-17 RX ORDER — IOPAMIDOL 755 MG/ML
140 INJECTION, SOLUTION INTRAVASCULAR ONCE
Status: COMPLETED | OUTPATIENT
Start: 2021-11-17 | End: 2021-11-17

## 2021-11-17 RX ORDER — ACETAMINOPHEN 325 MG/1
650 TABLET ORAL ONCE
Status: DISCONTINUED | OUTPATIENT
Start: 2021-11-17 | End: 2021-11-17 | Stop reason: HOSPADM

## 2021-11-17 RX ORDER — FAMOTIDINE 20 MG/1
20 TABLET, FILM COATED ORAL 2 TIMES DAILY
Qty: 20 TABLET | Refills: 0 | Status: SHIPPED | OUTPATIENT
Start: 2021-11-17 | End: 2023-03-21

## 2021-11-17 RX ADMIN — ONDANSETRON 4 MG: 4 TABLET, ORALLY DISINTEGRATING ORAL at 15:44

## 2021-11-17 RX ADMIN — IOPAMIDOL 140 ML: 755 INJECTION, SOLUTION INTRAVENOUS at 16:31

## 2021-11-17 NOTE — ED PROVIDER NOTES
EMERGENCY DEPARTMENT ENCOUNTER      NAME: Liza Goins  AGE: 51 year old female  YOB: 1970  MRN: [id number]  EVALUATION DATE & TIME: 2021  3:14 PM    PCP: Nena Heath    ED PROVIDER: David Pedraza M.D.      Chief Complaint   Patient presents with     Abdominal Pain       FINAL IMPRESSION:  1. Lower abdominal pain    2. Nausea    3. Gastroenteritis          ED COURSE & MEDICAL DECISION MAKIN year old female presents to the Emergency Department for evaluation of lower abdominal pain.  She is vitally stable when she arrives to the emergency department.  She has a moderate amount of bilateral lower abdominal tenderness.  She underwent labs and imaging as below.  Labs including normal white blood cell count, stable hemoglobin, normal liver function tests, stable metabolic profile and renal function.  Negative urine analysis and pregnancy test.  CT abdomen pelvis with no evidence of appendicitis, diverticulitis, or other acute inflammatory process in the abdomen or pelvis.  No suspicion at this point for ovarian torsion given normal appearance of ovaries on CT and description of  pain and timing which did not seem consistent.  Given her nausea and preceding loose stool, may have some component of gastroenteritis.  Discussed supportive management at home including Zofran, Maalox, famotidine, bland diet, and the importance of outpatient primary care follow-up for symptoms lingering into next week.  ED return precautions were reviewed.  Patient left the emergency department stable condition    At the conclusion of the encounter I discussed the results of all of the tests and the disposition. The questions were answered. The patient or family acknowledged understanding and was agreeable with the care plan.       MEDICATIONS GIVEN IN THE EMERGENCY:  Medications   acetaminophen (TYLENOL) tablet 650 mg (650 mg Oral Not Given 21 1552)   ibuprofen (ADVIL/MOTRIN) tablet 600 mg  (600 mg Oral Not Given 11/17/21 1552)   ondansetron (ZOFRAN-ODT) ODT tab 4 mg (4 mg Oral Given 11/17/21 1544)   iopamidol (ISOVUE-370) solution 140 mL (140 mLs Intravenous Given 11/17/21 1631)   sodium chloride (PF) 0.9% PF flush 60 mL (60 mLs Intravenous Given 11/17/21 1630)       NEW PRESCRIPTIONS STARTED AT TODAY'S ER VISIT  Discharge Medication List as of 11/17/2021  5:23 PM      START taking these medications    Details   alum & mag hydroxide-simethicone (MAALOX MAX) 400-400-40 MG/5ML SUSP suspension Take 30 mLs by mouth every 4 hours as needed for indigestion or heartburn, Disp-355 mL, R-0, Local Print      famotidine (PEPCID) 20 MG tablet Take 1 tablet (20 mg) by mouth 2 times daily, Disp-20 tablet, R-0, Local Print      ondansetron (ZOFRAN-ODT) 4 MG ODT tab Take 1 tablet (4 mg) by mouth every 8 hours as needed for nausea, Disp-15 tablet, R-0, Local Print                =================================================================    HPI    Patient information was obtained from: Patient    Use of : N/A      Liza Goins is a 51 year old female who presents to this ED today for evaluation of lower abdominal pain.  She reports she has had waxing and waning lower abdominal pain since yesterday, seems to affect both parts of her lower abdomen.  She says she felt nauseated and was dry heaving earlier but did not have significant amount of emesis.  She says that 2 nights ago prior to onset of symptoms she did have about 3 loose bowel movements.  Has not had a bowel movement since that time.  She denies any dysuria or hematuria.  Denies any abnormal vaginal bleeding or discharge.  She denies any fevers.  Did have lunch before going to work today.  She has not taken anything today for pain.      REVIEW OF SYSTEMS   All systems reviewed and negative except as noted in HPI.    PAST MEDICAL HISTORY:  History reviewed. No pertinent past medical history.    PAST SURGICAL HISTORY:  Past Surgical History:    Procedure Laterality Date     COLONOSCOPY N/A 9/24/2020    Procedure: diagnostic colonoscopy with biopsy;  Surgeon: Bong Olson MD;  Location: [facility] OR           CURRENT MEDICATIONS:    Current Facility-Administered Medications   Medication     acetaminophen (TYLENOL) tablet 650 mg     ibuprofen (ADVIL/MOTRIN) tablet 600 mg     Current Outpatient Medications   Medication     albuterol (PROAIR HFA/PROVENTIL HFA/VENTOLIN HFA) 108 (90 Base) MCG/ACT inhaler     alum & mag hydroxide-simethicone (MAALOX MAX) 400-400-40 MG/5ML SUSP suspension     cholecalciferol (VITAMIN D3) 25 mcg (1000 units) capsule     famotidine (PEPCID) 20 MG tablet     loratadine (CLARITIN) 10 MG tablet     methocarbamol (ROBAXIN) 750 MG tablet     ondansetron (ZOFRAN-ODT) 4 MG ODT tab     benzoyl peroxide 2.5 % GEL     clindamycin (CLEOCIN T) 1 % external lotion     ketoconazole (NIZORAL) 2 % external shampoo     meloxicam (MOBIC) 15 MG tablet         ALLERGIES:  Allergies   Allergen Reactions     Latex Itching, Hives and Rash       FAMILY HISTORY:  History reviewed. No pertinent family history.    SOCIAL HISTORY:   Social History     Socioeconomic History     Marital status: Single     Spouse name: None     Number of children: None     Years of education: None     Highest education level: None   Occupational History     None   Tobacco Use     Smoking status: Former Smoker     Smokeless tobacco: Never Used   Substance and Sexual Activity     Alcohol use: Not Currently     Comment: rare     Drug use: Not Currently     Sexual activity: None   Other Topics Concern     None   Social History Narrative     None     Social Determinants of Health     Financial Resource Strain: Not on file   Food Insecurity: Not on file   Transportation Needs: Not on file   Physical Activity: Not on file   Stress: Not on file   Social Connections: Not on file   Intimate Partner Violence: Not on file   Housing Stability: Not on file       VITALS:  /78   Pulse 67    Temp 98.7  F (37.1  C) (Tympanic)   Resp 18   LMP 08/17/2021   SpO2 97%     PHYSICAL EXAM    Constitutional: Well developed, Well nourished, NAD.  HENT: Normocephalic, Atraumatic. Neck Supple.  Eyes: EOMI, Conjunctiva normal.  Respiratory: Breathing comfortably on room air. Speaks full sentences easily. Lungs clear to ascultation.  Cardiovascular: Normal heart rate, Regular rhythm. No peripheral edema.  Abdomen: Soft, moderate bilateral lower abdominal tenderness with no peritoneal signs.  Musculoskeletal: Good range of motion in all major joints. No major deformities noted.  Integument: Warm, Dry.  Neurologic: Alert & awake, Normal motor function, Normal sensory function, No focal deficits noted.   Psychiatric: Cooperative. Affect appropriate.     LAB:  All pertinent labs reviewed and interpreted.  Labs Ordered and Resulted from Time of ED Arrival to Time of ED Departure   ROUTINE UA WITH MICROSCOPIC REFLEX TO CULTURE - Abnormal       Result Value    Color Urine Light Yellow      Appearance Urine Clear      Glucose Urine Negative      Bilirubin Urine Negative      Ketones Urine Negative      Specific Gravity Urine 1.012      Blood Urine Negative      pH Urine 5.0      Protein Albumin Urine Negative      Urobilinogen Urine Normal      Nitrite Urine Negative      Leukocyte Esterase Urine Negative      Mucus Urine Present (*)     RBC Urine 1      WBC Urine 1      Squamous Epithelials Urine 2 (*)    COMPREHENSIVE METABOLIC PANEL - Normal    Sodium 138      Potassium 4.0      Chloride 106      Carbon Dioxide (CO2) 28      Anion Gap 4      Urea Nitrogen 7      Creatinine 0.74      Calcium 9.1      Glucose 90      Alkaline Phosphatase 86      AST 12      ALT 22      Protein Total 7.4      Albumin 3.4      Bilirubin Total 0.5      GFR Estimate >90     LIPASE - Normal    Lipase 111     HCG QUALITATIVE URINE - Normal    hCG Urine Qualitative Negative     CBC WITH PLATELETS AND DIFFERENTIAL    WBC Count 8.5      RBC  Count 4.86      Hemoglobin 14.5      Hematocrit 43.3      MCV 89      MCH 29.8      MCHC 33.5      RDW 12.4      Platelet Count 248      % Neutrophils 57      % Lymphocytes 30      % Monocytes 9      % Eosinophils 3      % Basophils 1      % Immature Granulocytes 0      NRBCs per 100 WBC 0      Absolute Neutrophils 4.9      Absolute Lymphocytes 2.6      Absolute Monocytes 0.7      Absolute Eosinophils 0.2      Absolute Basophils 0.0      Absolute Immature Granulocytes 0.0      Absolute NRBCs 0.0         RADIOLOGY:  Reviewed all pertinent imaging. Please see official radiology report.  CT Abdomen Pelvis w Contrast   Final Result   IMPRESSION: No acute intra-abdominal findings.      Right sacroiliac osteoarthritic change versus sacroiliitis.      KORINA DIETRICH MD            SYSTEM ID:  [id number]          David Pedraza M.D.  Emergency Medicine  [facility]  [address] [place] [id number] [phone]  Dept: [phone]       David Pedraza MD  11/17/21 4688

## 2021-11-17 NOTE — ED NOTES
"IV was removed.  Discharge instructions reviewed with patient and patient verbalized understanding.  Patient notes she is relieved that there is nothing \"serious\" wrong with her.  Patient given 3 printed RXs for her to bring to a pharmacy of her choice. No further questions/concerns at this time.  Home.  "

## 2021-11-17 NOTE — TELEPHONE ENCOUNTER
"Abdominal pain - across entire lower abdomen. Pain starting on 11/16/21, was not constant on 11/16/21, pain would come and go. Experienced nausea and feeling of vomiting.      Pain is constant today (11/17/21). Pain is at a 4 on 1-10 pain scale. Dry heaving this am in the shower.     Denies fever.     Per protocol patient advised to go to the ED to be evaluated.     Recommended patient to establish with PCP as well.     Patient verbalized understanding.     Reason for Disposition    Constant abdominal pain lasting > 2 hours    Additional Information    Negative: Passed out (i.e., fainted, collapsed and was not responding)    Negative: Shock suspected (e.g., cold/pale/clammy skin, too weak to stand, low BP, rapid pulse)    Negative: Sounds like a life-threatening emergency to the triager    Negative: Chest pain    Negative: Pain is mainly in upper abdomen (if needed ask: 'is it mainly above the belly button?')    Negative: Abdominal pain and pregnant > 20 weeks    Negative: Abdominal pain and pregnant < 20 weeks    Negative: SEVERE abdominal pain (e.g., excruciating)    Negative: Vomiting red blood or black (coffee ground) material    Negative: Bloody, black, or tarry bowel movements (Exception: chronic-unchanged black-grey bowel movements and is taking iron pills or Pepto-bismol)    Answer Assessment - Initial Assessment Questions  1. LOCATION: \"Where does it hurt?\"       Across entire lower abdomen    2. RADIATION: \"Does the pain shoot anywhere else?\" (e.g., chest, back)      No radiation    3. ONSET: \"When did the pain begin?\" (e.g., minutes, hours or days ago)       11/16/21    4. SUDDEN: \"Gradual or sudden onset?\"      Suddenly     5. PATTERN \"Does the pain come and go, or is it constant?\"     - If constant: \"Is it getting better, staying the same, or worsening?\"       (Note: Constant means the pain never goes away completely; most serious pain is constant and it progresses)      - If intermittent: \"How long " "does it last?\" \"Do you have pain now?\"      (Note: Intermittent means the pain goes away completely between bouts)      Constant today (11/17/21)    6. SEVERITY: \"How bad is the pain?\"  (e.g., Scale 1-10; mild, moderate, or severe)    - MILD (1-3): doesn't interfere with normal activities, abdomen soft and not tender to touch     - MODERATE (4-7): interferes with normal activities or awakens from sleep, tender to touch     - SEVERE (8-10): excruciating pain, doubled over, unable to do any normal activities       Moderate at a 4    7. RECURRENT SYMPTOM: \"Have you ever had this type of abdominal pain before?\" If so, ask: \"When was the last time?\" and \"What happened that time?\"       No     8. CAUSE: \"What do you think is causing the abdominal pain?\"      Unknown     9. RELIEVING/AGGRAVATING FACTORS: \"What makes it better or worse?\" (e.g., movement, antacids, bowel movement)      No     10. OTHER SYMPTOMS: \"Has there been any vomiting, diarrhea, constipation, or urine problems?\"        Nausea, dry heaves     11. PREGNANCY: \"Is there any chance you are pregnant?\" \"When was your last menstrual period?\"        No    Protocols used: ABDOMINAL PAIN - FEMALE-A-OH      "

## 2021-11-17 NOTE — ED TRIAGE NOTES
Patient presents with complaints of abdominal pain since yesterday states that it sometimes gets down to a 3 but up to a 6 at times.

## 2021-11-17 NOTE — ED NOTES
Pt was changed into a hospital gown and up to the bathroom to provide a urine sample. She will push the button once done and back in bed. Care team notified.

## 2021-11-17 NOTE — DISCHARGE INSTRUCTIONS
You were seen today in the emergency department at [facility] for lower abdominal pain.  Your CT scan did not show any serious process like appendicitis, diverticulitis, or anything requiring antibiotics or surgery.  We think based on your description of symptoms you are probably dealing with a viral gastroenteritis which usually will improve within the next few days.  We would recommend using Zofran for nausea and vomiting and continuing to use Tylenol, Maalox, and famotidine for pain and to suppress stomach acid.  We would like you to follow-up with your doctor by next week for any lingering concerns.    What to expect when you have contrast    During your exam, we will inject  contrast  into your vein or artery. (Contrast is a clear liquid with iodine in it. It shows up on X-rays.)    You may feel warm or hot. You may have a metal taste in your mouth and a slight upset stomach. You may also feel pressure near the kidneys and bladder. These effects will last about 1 to 3 minutes.    Please tell us if you have:   Sneezing    Itching   Hives    Swelling in the face   A hoarse voice   Breathing problems   Other new symptoms    Serious problems are rare.  They may include:   Irregular heartbeat    Seizures   Kidney failure             Tissue damage   Shock     Death    If you have any problems during the exam, we  will treat them right away.    When you get home    Call your hospital if you have any new symptoms in the next 2 days, like hives or swelling. (Phone numbers are at the bottom of this page.) Or call your family doctor.     If you have wheezing or trouble breathing, call 911.    Self-care  -Drink at least 4 extra glasses of water today.   This reduces the stress on your kidneys.  -Keep taking your regular medicines.    The contrast will pass out of your body in your  Urine(pee). This will happen in the next 24 hours. You  will not feel this. Your urine will not  change color.    If you  have kidney problems or take metformin    Drink 4 to 8 large glasses of water for the next  2 days, if you are not on a fluid restriction.    ?If you take metformin (Glucophage or Glucovance) for diabetes, keep taking it.      ?Your kidney function tests are abnormal.  If you take Metformin, do not take it for 48 hours. Please go to your clinic for a blood test within 3 days after your exam before the restarting this medicine.     (Note to provider:please give patient prescription for lab tests.)    ?Special instructions: --    I have read and understand the above information.    Patient Sign Here:______________________________________Date:________Time:______    Staff Sign Here:________________________________________Date:_______Time:______      Radiology Departments:     ?[facility]: [phone] ?[facility]: [phone]     ?[facility]: [phone] ?[facility]:[phone]      ?[facility]: [phone]  ?[facility]: [phone]  ?[place]:[phone]    ?[facility]:[phone]  ?[facility]:[phone]

## 2021-12-15 PROBLEM — N93.9 ABNORMAL UTERINE BLEEDING: Status: ACTIVE | Noted: 2018-11-10

## 2021-12-20 ENCOUNTER — OFFICE VISIT (OUTPATIENT)
Dept: AUDIOLOGY | Facility: OTHER | Age: 51
End: 2021-12-20
Attending: AUDIOLOGIST
Payer: COMMERCIAL

## 2021-12-20 ENCOUNTER — OFFICE VISIT (OUTPATIENT)
Dept: OTOLARYNGOLOGY | Facility: OTHER | Age: 51
End: 2021-12-20
Attending: AUDIOLOGIST
Payer: COMMERCIAL

## 2021-12-20 VITALS
OXYGEN SATURATION: 95 % | DIASTOLIC BLOOD PRESSURE: 80 MMHG | BODY MASS INDEX: 48.32 KG/M2 | HEART RATE: 69 BPM | TEMPERATURE: 97.4 F | SYSTOLIC BLOOD PRESSURE: 126 MMHG | HEIGHT: 65 IN | WEIGHT: 290 LBS

## 2021-12-20 DIAGNOSIS — R42 DIZZINESS: ICD-10-CM

## 2021-12-20 DIAGNOSIS — L29.9 EAR ITCHING: ICD-10-CM

## 2021-12-20 DIAGNOSIS — H91.93 DECREASED HEARING OF BOTH EARS: ICD-10-CM

## 2021-12-20 DIAGNOSIS — H90.3 SENSORINEURAL HEARING LOSS (SNHL) OF BOTH EARS: Primary | ICD-10-CM

## 2021-12-20 DIAGNOSIS — H61.813 EXOSTOSIS OF BOTH EXTERNAL AUDITORY CANALS: Primary | ICD-10-CM

## 2021-12-20 DIAGNOSIS — H93.13 TINNITUS, BILATERAL: ICD-10-CM

## 2021-12-20 DIAGNOSIS — R42 DISEQUILIBRIUM: ICD-10-CM

## 2021-12-20 PROCEDURE — 92557 COMPREHENSIVE HEARING TEST: CPT | Performed by: AUDIOLOGIST

## 2021-12-20 PROCEDURE — 92504 EAR MICROSCOPY EXAMINATION: CPT | Performed by: NURSE PRACTITIONER

## 2021-12-20 PROCEDURE — G0463 HOSPITAL OUTPT CLINIC VISIT: HCPCS | Mod: 25

## 2021-12-20 PROCEDURE — 99213 OFFICE O/P EST LOW 20 MIN: CPT | Mod: 25 | Performed by: NURSE PRACTITIONER

## 2021-12-20 PROCEDURE — 92550 TYMPANOMETRY & REFLEX THRESH: CPT | Performed by: AUDIOLOGIST

## 2021-12-20 ASSESSMENT — PAIN SCALES - GENERAL: PAINLEVEL: NO PAIN (0)

## 2021-12-20 ASSESSMENT — MIFFLIN-ST. JEOR: SCORE: 1936.31

## 2021-12-20 NOTE — PROGRESS NOTES
Audiology Evaluation Completed. Please refer SCANNED AUDIOGRAM and/or TYMPANOGRAM for BACKGROUND, RESULTS, RECOMMENDATIONS.      ADDENDUM-Growths in both ear canals.        Jamilah HURT, [facility]  Audiologist #[id number]         No respiratory distress. No stridor, Lungs sounds clear with good aeration bilaterally.

## 2021-12-20 NOTE — PROGRESS NOTES
Otolaryngology Note         Chief Complaint:     Patient presents with:  Follow Up: SANYA            History of Present Illness:     Liza Goins is a 51 year old female seen today for concerns for ear infections.  She reports a history of recurrent swimmer's ear.  She reports she has been having some intermittent ear infections since her cancer diagnosis 3 years ago.      She reports history of OM as a young child, none as adult    She reports a history of breast cancer, she had a double mastectomy 3 years ago, no chemo or radiation completed.      No ear pain today  No thaddeus otorrhea, she feels some moisture in her ear at times  She feels itchiness in her ears  She does not take anything for ear itching  She also has noted sneezing frequently, multiple times in a row.    She is unsure if she has any environmental allergies but is interested in allergy testing  She does not associate any specific exposures.      She feels that her hearing has been good.  No flux hearing.   She has constant tinnitus, it is bothersome, keeps her up at night at times.  Has been chronic for many years and is unchanged.  She feels some times that she feels off balance.  She recently felt it when she was on a step stool, she felt like she didn't have good balance.  She feels off balance a few times per week.    No rotary vertigo  No associated change in hearing or ear ringing with off balance.    The symptoms last for less than a minute and resolve.    No falls due to off balance.  She recently slipped on water when she got out of the shower.      No new medication or supplement.   No history of migraine  She has been getting headaches recently, headache is located on the occiput.  Pain is 9-10/10.  She takes excedrin with relief at times. The headache can last all day at times.  She denies associated nausea, light-sensitivity or sensitivity to sounds.  She does endorse increased stress, neck tension.  She was slightly teary when  talking about it.     She has been having tinnitus for the past 20 years after noise exposure at a loud bar.    no fluctuation in hearing  No current otalgia or otorrhea  No Aural fullness  No ataxia  No near syncope or syncope  No facial numbness or tingling  No recent head injury.    No recent vision changes, but has noted a gradual change in near vision recently  + history of significant noise exposure,   She has been under a lot of stress recently  She recently moved to the area to help care for her grandchildren.  This summer her daughter moved out of the area.  She is thinking about moving back to the cities.   She has a lot of neck tension    Became teary when she talked about her grandchildren    She is concerned about chest pains recently.  She has had intermittent chest pains.  Doesn't seem to correlate with activity and pain can be on both sides.  N current chest pain, shortness of breath.  She has not had chest pain for the last week  She is a former smoker, quit many years ago    Audiogram completed today:  Tympanograms are Type A for both ears suggesting normal eardrum mobility.  Acoustic Reflex Thresholds at 1000 Hz are present for both ears.  Thresholds are normal sloping to mild sensorineural hearing loss both ears.  Speech reception thresholds are in good agreement with pure tone average.  Word discrimination scores are excellent at supra-thresholds level.         Medications:     Current Outpatient Rx   Medication Sig Dispense Refill     albuterol (PROAIR HFA/PROVENTIL HFA/VENTOLIN HFA) 108 (90 Base) MCG/ACT inhaler Inhale 1-2 puffs into the lungs       alum & mag hydroxide-simethicone (MAALOX MAX) 400-400-40 MG/5ML SUSP suspension Take 30 mLs by mouth every 4 hours as needed for indigestion or heartburn 355 mL 0     benzoyl peroxide 2.5 % GEL        cholecalciferol (VITAMIN D3) 25 mcg (1000 units) capsule Take 1,000 Units by mouth       clindamycin (CLEOCIN T) 1 % external lotion         "famotidine (PEPCID) 20 MG tablet Take 1 tablet (20 mg) by mouth 2 times daily 20 tablet 0     ketoconazole (NIZORAL) 2 % external shampoo        loratadine (CLARITIN) 10 MG tablet Take 10 mg by mouth       meloxicam (MOBIC) 15 MG tablet Take 1 tablet with food once daily if needed for pain       ondansetron (ZOFRAN-ODT) 4 MG ODT tab Take 1 tablet (4 mg) by mouth every 8 hours as needed for nausea 15 tablet 0     methocarbamol (ROBAXIN) 750 MG tablet Take 750 mg by mouth (Patient not taking: Reported on 12/20/2021)              Allergies:     Allergies: Latex          Past Medical History:     History reviewed. No pertinent past medical history.         Past Surgical History:     Past Surgical History:   Procedure Laterality Date     COLONOSCOPY N/A 9/24/2020    Procedure: diagnostic colonoscopy with biopsy;  Surgeon: Bong Olson MD;  Location: [facility]       ENT family history reviewed         Social History:     Social History     Tobacco Use     Smoking status: Former Smoker     Smokeless tobacco: Never Used   Substance Use Topics     Alcohol use: Not Currently     Comment: rare     Drug use: Not Currently            Review of Systems:     ROS: See HPI         Physical Exam:     /80 (BP Location: Right arm, Cuff Size: Adult Large)   Pulse 69   Temp 97.4  F (36.3  C) (Tympanic)   Ht 1.651 m (5' 5\")   Wt 131.5 kg (290 lb)   SpO2 95%   BMI 48.26 kg/m      General - The patient is well nourished and well developed, and appears to have good nutritional status.  Alert and oriented to person and place, answers questions and cooperates with examination appropriately.   Head and Face - Normocephalic and atraumatic, with no gross asymmetry noted.  The facial nerve is intact, with strong symmetric movements.  Voice and Breathing - The patient was breathing comfortably without the use of accessory muscles. There was no wheezing, stridor. The patients voice was clear and strong, and had appropriate pitch and " quality.  Neuro:  Cranial nerve examination: revealed that for cranial nerve   II: the pupils were reactive  III, IV, and VI, the extraocular movements were full.    V: facial sensation intact bilateral   VII: facial movements are symmetric  VIII: hearing intact to voice  IX & X: the soft palate rises symmetrically   XI: shoulder movements are symmetric  XII: tongue is midline  Gait is steady and well coordinated.    Irwin hallpike negative bilaterally  Equal  strength bilaterally  Ears - External ear normal. The ears were examined under binocular microscopy and with otoscope.  Bilateral canals have extensive exostosis, left > right.  Small amount of cerumen removed on left.  Approximately 75% of right TM is visualized, the visualized portion is intact without effusion, retraction or mass.  Approximately 30% of left TM is visualized due to exostosis, the visualized portion is intact without effusion, retraction or mass.  Eyes - Extraocular movements intact, and the pupils were reactive to light. Sclera were not icteric or injected, conjunctiva were pink and moist.    Mouth - Examination of the oral cavity showed pink, healthy oral mucosa. Dentition in good repair. No lesions or ulcerations noted. The tongue was mobile and midline.   Throat - The walls of the oropharynx were smooth, pink, moist, symmetric, and had no lesions or ulcerations.  The tonsillar pillars and soft palate were symmetric. The uvula was midline on elevation.    Neck - no worrisome palpable lymphadenopathy  Nose - External contour is symmetric, no gross deflection or scars.  Nasal mucosa is pink and moist with no abnormal mucus.  The septum and turbinates were evaluated with nasal speculum, no polyps, masses, or purulence noted on examination.           Assessment and Plan:       ICD-10-CM    1. Exostosis of both external auditory canals  H61.813    2. Disequilibrium  R42    3. Ear itching  L29.9      Tinnitus education was provided.  Tinnitus  is widely considered a disorder of cental auditory processing.       Hearing preservation was reinforced     I also cautioned the patient against investing in any oral supplements advertised to cure tinnitus.     I have also recommended yearly audiograms, masking devices or apps.  For worsening symptoms, I recommend online or in person cognitive behavioral therapy (CBT) referral.     The patient will follow up as necessary for worsening symptoms or changes in symptoms.       Please follow up as needed for worsening symptoms, changes in symptoms, or signs of infection.  If there is any dizziness or facial weakness, call for a recheck.     Follow up for allergy testing  Follow up with PCP for concerns for intermittent chest pains.   Consider vestibular therapy for disequilibrium, negative for BPPV today, cranial nerve test normal      VINEGAR AND DISTILLED WATER IRRIGATION FOR EARS    This solution should only be used if the ears have noted drainage, or debris.      Using a sterile cup, mix 1/2 cup of white vinegar with 1/2 cup distilled water.    Irrigate the ear(s) using 15mL of solution every other day.    Completely dry the ear after irrigation, using a blow dryer on a low heat setting.       **If you are using ear drops, use the drops in the morning and the irrigation solution in the evening.       Indications for allergy testing include:    1) Confirm suspicion of allergic rhinitis due to inhalant allergies  2) Identify the offending allergen to determine specific mode of treatment  3) In the case of chronic rhinosinusitis: when symptoms are not controlled by avoidance and pharmacotherapy  4) In the Asthma patient when exacerbations may be due to perennial allergen exposure  5) Suspect food allergy  6) Otitis Media, chronic rhinitis, atopic dermatitis, Meniere disease, headache, pharyngitis or eye symptoms      Modified quantitative testing (MQT) will be performed.  Signed consent was obtained, and the risks of  immunotherapy were discussed, including the potential for anaphylaxis.     If immunotherapy (IT) is recommended, there is continued risk of anaphylaxis.   Anaphylaxis can cause death. The patient will need to be monitored for 30 minutes post injection.  They must present their epinephrine pen prior to injection.  Subcutaneous as well as sublingual immunotherapy (SLIT) were discussed as potential treatment options.  The patient was told SLIT is not approved by the FDA and is cash pay.  The general time frame of immunotherapy was discussed (generally 3-5 years, sometimes longer), and the basic immunology behind IT was discussed.    Bernie Ruth NP-C  [facility]

## 2021-12-20 NOTE — LETTER
12/20/2021         RE: Liza Goins  [address] [facility] [address]        Dear Colleague,    Thank you for referring your patient, Liza Goins, to the [facility]. Please see a copy of my visit note below.      Otolaryngology Note         Chief Complaint:     Patient presents with:  Follow Up: HEA            History of Present Illness:     Liza Goins is a 51 year old female seen today for concerns for ear infections.  She reports a history of recurrent swimmer's ear.  She reports she has been having some intermittent ear infections since her cancer diagnosis 3 years ago.      She reports history of OM as a young child, none as adult    She reports a history of breast cancer, she had a double mastectomy 3 years ago, no chemo or radiation completed.      No ear pain today  No thaddeus otorrhea, she feels some moisture in her ear at times  She feels itchiness in her ears  She does not take anything for ear itching  She also has noted sneezing frequently, multiple times in a row.    She is unsure if she has any environmental allergies but is interested in allergy testing  She does not associate any specific exposures.      She feels that her hearing has been good.  No flux hearing.   She has constant tinnitus, it is bothersome, keeps her up at night at times.  Has been chronic for many years and is unchanged.  She feels some times that she feels off balance.  She recently felt it when she was on a step stool, she felt like she didn't have good balance.  She feels off balance a few times per week.    No rotary vertigo  No associated change in hearing or ear ringing with off balance.    The symptoms last for less than a minute and resolve.    No falls due to off balance.  She recently slipped on water when she got out of the shower.      No new medication or supplement.   No history of migraine  She has been getting headaches recently, headache is located on the occiput.  Pain is 9-10/10.   She takes excedrin with relief at times. The headache can last all day at times.  She denies associated nausea, light-sensitivity or sensitivity to sounds.  She does endorse increased stress, neck tension.  She was slightly teary when talking about it.     She has been having tinnitus for the past 20 years after noise exposure at a loud bar.    no fluctuation in hearing  No current otalgia or otorrhea  No Aural fullness  No ataxia  No near syncope or syncope  No facial numbness or tingling  No recent head injury.    No recent vision changes, but has noted a gradual change in near vision recently  + history of significant noise exposure,   She has been under a lot of stress recently  She recently moved to the area to help care for her grandchildren.  This summer her daughter moved out of the area.  She is thinking about [place] back to the cities.   She has a lot of neck tension    Became teary when she talked about her grandchildren    She is concerned about chest pains recently.  She has had intermittent chest pains.  Doesn't seem to correlate with activity and pain can be on both sides.  N current chest pain, shortness of breath.  She has not had chest pain for the last week  She is a former smoker, quit many years ago    Audiogram completed today:  Tympanograms are Type A for both ears suggesting normal eardrum mobility.  Acoustic Reflex Thresholds at 1000 Hz are present for both ears.  Thresholds are normal sloping to mild sensorineural hearing loss both ears.  Speech reception thresholds are in good agreement with pure tone average.  Word discrimination scores are excellent at supra-thresholds level.         Medications:     Current Outpatient Rx   Medication Sig Dispense Refill     albuterol (PROAIR HFA/PROVENTIL HFA/VENTOLIN HFA) 108 (90 Base) MCG/ACT inhaler Inhale 1-2 puffs into the lungs       alum & mag hydroxide-simethicone (MAALOX MAX) 400-400-40 MG/5ML SUSP suspension Take 30 mLs by mouth every 4 hours  "as needed for indigestion or heartburn 355 mL 0     benzoyl peroxide 2.5 % GEL        cholecalciferol (VITAMIN D3) 25 mcg (1000 units) capsule Take 1,000 Units by mouth       clindamycin (CLEOCIN T) 1 % external lotion        famotidine (PEPCID) 20 MG tablet Take 1 tablet (20 mg) by mouth 2 times daily 20 tablet 0     ketoconazole (NIZORAL) 2 % external shampoo        loratadine (CLARITIN) 10 MG tablet Take 10 mg by mouth       meloxicam (MOBIC) 15 MG tablet Take 1 tablet with food once daily if needed for pain       ondansetron (ZOFRAN-ODT) 4 MG ODT tab Take 1 tablet (4 mg) by mouth every 8 hours as needed for nausea 15 tablet 0     methocarbamol (ROBAXIN) 750 MG tablet Take 750 mg by mouth (Patient not taking: Reported on 12/20/2021)              Allergies:     Allergies: Latex          Past Medical History:     History reviewed. No pertinent past medical history.         Past Surgical History:     Past Surgical History:   Procedure Laterality Date     COLONOSCOPY N/A 9/24/2020    Procedure: diagnostic colonoscopy with biopsy;  Surgeon: Bong Olson MD;  Location: [facility]       ENT family history reviewed         Social History:     Social History     Tobacco Use     Smoking status: Former Smoker     Smokeless tobacco: Never Used   Substance Use Topics     Alcohol use: Not Currently     Comment: rare     Drug use: Not Currently            Review of Systems:     ROS: See HPI         Physical Exam:     /80 (BP Location: Right arm, Cuff Size: Adult Large)   Pulse 69   Temp 97.4  F (36.3  C) (Tympanic)   Ht 1.651 m (5' 5\")   Wt 131.5 kg (290 lb)   SpO2 95%   BMI 48.26 kg/m      General - The patient is well nourished and well developed, and appears to have good nutritional status.  Alert and oriented to person and place, answers questions and cooperates with examination appropriately.   Head and Face - Normocephalic and atraumatic, with no gross asymmetry noted.  The facial nerve is intact, with strong " symmetric movements.  Voice and Breathing - The patient was breathing comfortably without the use of accessory muscles. There was no wheezing, stridor. The patients voice was clear and strong, and had appropriate pitch and quality.  Neuro:  Cranial nerve examination: revealed that for cranial nerve   II: the pupils were reactive  III, IV, and VI, the extraocular movements were full.    V: facial sensation intact bilateral   VII: facial movements are symmetric  VIII: hearing intact to voice  IX & X: the soft palate rises symmetrically   XI: shoulder movements are symmetric  XII: tongue is midline  Gait is steady and well coordinated.    [place]acon hallpike negative bilaterally  Equal  strength bilaterally  Ears - External ear normal. The ears were examined under binocular microscopy and with otoscope.  Bilateral canals have extensive exostosis, left > right.  Small amount of cerumen removed on left.  Approximately 75% of right TM is visualized, the visualized portion is intact without effusion, retraction or mass.  Approximately 30% of left TM is visualized due to exostosis, the visualized portion is intact without effusion, retraction or mass.  Eyes - Extraocular movements intact, and the pupils were reactive to light. Sclera were not icteric or injected, conjunctiva were pink and moist.    Mouth - Examination of the oral cavity showed pink, healthy oral mucosa. Dentition in good repair. No lesions or ulcerations noted. The tongue was mobile and midline.   Throat - The walls of the oropharynx were smooth, pink, moist, symmetric, and had no lesions or ulcerations.  The tonsillar pillars and soft palate were symmetric. The uvula was midline on elevation.    Neck - no worrisome palpable lymphadenopathy  Nose - External contour is symmetric, no gross deflection or scars.  Nasal mucosa is pink and moist with no abnormal mucus.  The septum and turbinates were evaluated with nasal speculum, no polyps, masses, or purulence  noted on examination.           Assessment and Plan:       ICD-10-CM    1. Exostosis of both external auditory canals  H61.813    2. Disequilibrium  R42    3. Ear itching  L29.9      Tinnitus education was provided.  Tinnitus is widely considered a disorder of cental auditory processing.       Hearing preservation was reinforced     I also cautioned the patient against investing in any oral supplements advertised to cure tinnitus.     I have also recommended yearly audiograms, masking devices or apps.  For worsening symptoms, I recommend online or in person cognitive behavioral therapy (CBT) referral.     The patient will follow up as necessary for worsening symptoms or changes in symptoms.       Please follow up as needed for worsening symptoms, changes in symptoms, or signs of infection.  If there is any dizziness or facial weakness, call for a recheck.     Follow up for allergy testing  Follow up with PCP for concerns for intermittent chest pains.   Consider vestibular therapy for disequilibrium, negative for BPPV today, cranial nerve test normal      VINEGAR AND DISTILLED WATER IRRIGATION FOR EARS    This solution should only be used if the ears have noted drainage, or debris.      Using a sterile cup, mix 1/2 cup of white vinegar with 1/2 cup distilled water.    Irrigate the ear(s) using 15mL of solution every other day.    Completely dry the ear after irrigation, using a blow dryer on a low heat setting.       **If you are using ear drops, use the drops in the morning and the irrigation solution in the evening.       Indications for allergy testing include:    1) Confirm suspicion of allergic rhinitis due to inhalant allergies  2) Identify the offending allergen to determine specific mode of treatment  3) In the case of chronic rhinosinusitis: when symptoms are not controlled by avoidance and pharmacotherapy  4) In the Asthma patient when exacerbations may be due to perennial allergen exposure  5) Suspect food  allergy  6) Otitis Media, chronic rhinitis, atopic dermatitis, Meniere disease, headache, pharyngitis or eye symptoms      Modified quantitative testing (MQT) will be performed.  Signed consent was obtained, and the risks of immunotherapy were discussed, including the potential for anaphylaxis.     If immunotherapy (IT) is recommended, there is continued risk of anaphylaxis.   Anaphylaxis can cause death. The patient will need to be monitored for 30 minutes post injection.  They must present their epinephrine pen prior to injection.  Subcutaneous as well as sublingual immunotherapy (SLIT) were discussed as potential treatment options.  The patient was told SLIT is not approved by the FDA and is cash pay.  The general time frame of immunotherapy was discussed (generally 3-5 years, sometimes longer), and the basic immunology behind IT was discussed.    Bernie NASH  [facility]          Again, thank you for allowing me to participate in the care of your patient.        Sincerely,        Bernie Ruth NP

## 2021-12-20 NOTE — PATIENT INSTRUCTIONS
Tinnitus education was provided.  Tinnitus is widely considered a disorder of cental auditory processing.       Hearing preservation was reinforced     I also cautioned the patient against investing in any oral supplements advertised to cure tinnitus.     I have also recommended yearly audiograms, masking devices or apps.  For worsening symptoms, I recommend online or in person cognitive behavioral therapy (CBT) referral.     The patient will follow up as necessary for worsening symptoms or changes in symptoms.       Please follow up as needed for worsening symptoms, changes in symptoms, or signs of infection.  If there is any dizziness or facial weakness, call for a recheck.       VINEGAR AND DISTILLED WATER IRRIGATION FOR EARS    This solution should only be used if the ears have noted drainage, or debris.      Using a sterile cup, mix 1/2 cup of white vinegar with 1/2 cup distilled water.    Irrigate the ear(s) using 15mL of solution every other day.    Completely dry the ear after irrigation, using a blow dryer on a low heat setting.       **If you are using ear drops, use the drops in the morning and the irrigation solution in the evening.

## 2021-12-20 NOTE — NURSING NOTE
"Chief Complaint   Patient presents with     Follow Up     HEA        Initial /80 (BP Location: Right arm, Cuff Size: Adult Large)   Pulse 69   Temp 97.4  F (36.3  C) (Tympanic)   Ht 1.651 m (5' 5\")   Wt 131.5 kg (290 lb)   SpO2 95%   BMI 48.26 kg/m   Estimated body mass index is 48.26 kg/m  as calculated from the following:    Height as of this encounter: 1.651 m (5' 5\").    Weight as of this encounter: 131.5 kg (290 lb).  Medication Reconciliation: complete  Malinda Stephens LPN    "

## 2021-12-20 NOTE — NURSING NOTE
Went over instructions with patient for allergy skin testing.  Reviewed patients current medications and patient will avoid all contraindicated medications prior to MQT testing.  Patient verbalizes understanding.  Copy of allergy testing packet was given to the patient.  Patient was brought to scheduling to schedule her testing.  She is advised to call if she has any questions.    Elias Gudino RN on 12/20/2021 at 2:11 PM

## 2021-12-22 ENCOUNTER — APPOINTMENT (OUTPATIENT)
Dept: GENERAL RADIOLOGY | Facility: HOSPITAL | Age: 51
End: 2021-12-22
Attending: STUDENT IN AN ORGANIZED HEALTH CARE EDUCATION/TRAINING PROGRAM
Payer: COMMERCIAL

## 2021-12-22 ENCOUNTER — HOSPITAL ENCOUNTER (EMERGENCY)
Facility: HOSPITAL | Age: 51
Discharge: HOME | End: 2021-12-22
Attending: STUDENT IN AN ORGANIZED HEALTH CARE EDUCATION/TRAINING PROGRAM | Admitting: PHYSICIAN ASSISTANT
Payer: COMMERCIAL

## 2021-12-22 VITALS
RESPIRATION RATE: 12 BRPM | HEART RATE: 69 BPM | DIASTOLIC BLOOD PRESSURE: 58 MMHG | OXYGEN SATURATION: 95 % | SYSTOLIC BLOOD PRESSURE: 126 MMHG | TEMPERATURE: 97.5 F

## 2021-12-22 DIAGNOSIS — R07.9 CHEST PAIN: ICD-10-CM

## 2021-12-22 LAB
ANION GAP SERPL CALCULATED.3IONS-SCNC: 5 MMOL/L (ref 3–14)
BASOPHILS # BLD AUTO: 0.1 10E3/UL (ref 0–0.2)
BASOPHILS NFR BLD AUTO: 1 %
BUN SERPL-MCNC: 14 MG/DL (ref 7–30)
CALCIUM SERPL-MCNC: 8.8 MG/DL (ref 8.5–10.1)
CHLORIDE BLD-SCNC: 111 MMOL/L (ref 94–109)
CO2 SERPL-SCNC: 26 MMOL/L (ref 20–32)
CREAT SERPL-MCNC: 0.71 MG/DL (ref 0.52–1.04)
EOSINOPHIL # BLD AUTO: 0.3 10E3/UL (ref 0–0.7)
EOSINOPHIL NFR BLD AUTO: 4 %
ERYTHROCYTE [DISTWIDTH] IN BLOOD BY AUTOMATED COUNT: 12.4 % (ref 10–15)
GFR SERPL CREATININE-BSD FRML MDRD: >90 ML/MIN/1.73M2
GLUCOSE BLD-MCNC: 102 MG/DL (ref 70–99)
HCT VFR BLD AUTO: 43.6 % (ref 35–47)
HGB BLD-MCNC: 14.5 G/DL (ref 11.7–15.7)
IMM GRANULOCYTES # BLD: 0 10E3/UL
IMM GRANULOCYTES NFR BLD: 0 %
LYMPHOCYTES # BLD AUTO: 1.9 10E3/UL (ref 0.8–5.3)
LYMPHOCYTES NFR BLD AUTO: 27 %
MCH RBC QN AUTO: 30.1 PG (ref 26.5–33)
MCHC RBC AUTO-ENTMCNC: 33.3 G/DL (ref 31.5–36.5)
MCV RBC AUTO: 91 FL (ref 78–100)
MONOCYTES # BLD AUTO: 0.5 10E3/UL (ref 0–1.3)
MONOCYTES NFR BLD AUTO: 6 %
NEUTROPHILS # BLD AUTO: 4.5 10E3/UL (ref 1.6–8.3)
NEUTROPHILS NFR BLD AUTO: 62 %
NRBC # BLD AUTO: 0 10E3/UL
NRBC BLD AUTO-RTO: 0 /100
PLATELET # BLD AUTO: 233 10E3/UL (ref 150–450)
POTASSIUM BLD-SCNC: 3.9 MMOL/L (ref 3.4–5.3)
RBC # BLD AUTO: 4.81 10E6/UL (ref 3.8–5.2)
SODIUM SERPL-SCNC: 142 MMOL/L (ref 133–144)
TROPONIN I SERPL HS-MCNC: 8 NG/L
WBC # BLD AUTO: 7.3 10E3/UL (ref 4–11)

## 2021-12-22 PROCEDURE — 36415 COLL VENOUS BLD VENIPUNCTURE: CPT | Performed by: STUDENT IN AN ORGANIZED HEALTH CARE EDUCATION/TRAINING PROGRAM

## 2021-12-22 PROCEDURE — 93005 ELECTROCARDIOGRAM TRACING: CPT

## 2021-12-22 PROCEDURE — 84484 ASSAY OF TROPONIN QUANT: CPT | Performed by: STUDENT IN AN ORGANIZED HEALTH CARE EDUCATION/TRAINING PROGRAM

## 2021-12-22 PROCEDURE — 71046 X-RAY EXAM CHEST 2 VIEWS: CPT

## 2021-12-22 PROCEDURE — 82310 ASSAY OF CALCIUM: CPT | Performed by: STUDENT IN AN ORGANIZED HEALTH CARE EDUCATION/TRAINING PROGRAM

## 2021-12-22 PROCEDURE — 93010 ELECTROCARDIOGRAM REPORT: CPT | Performed by: INTERNAL MEDICINE

## 2021-12-22 PROCEDURE — 99285 EMERGENCY DEPT VISIT HI MDM: CPT | Mod: 25

## 2021-12-22 PROCEDURE — 85004 AUTOMATED DIFF WBC COUNT: CPT | Performed by: STUDENT IN AN ORGANIZED HEALTH CARE EDUCATION/TRAINING PROGRAM

## 2021-12-22 PROCEDURE — 99284 EMERGENCY DEPT VISIT MOD MDM: CPT | Performed by: PHYSICIAN ASSISTANT

## 2021-12-22 NOTE — ED PROVIDER NOTES
History     Chief Complaint   Patient presents with     Chest Pain     intermittent CP started couple days ago, lasting less than a minute, sharp.  SOB noticible but not concerning to pt     HPI  Liza Goins is a 51 year old female who rides to emergency department with chest pain that has been going on for 3 days.  Patient has a past medical history of colitis, ADHD, asthma, depression, chronic neck pain, malignant neoplasm of left breast, asthma, as well as reconstructive breast surgery and cholecystectomy.  Starting 3 days ago at rest patient started having chest pain, there is no provocation to it, it gets better with rest, it is not constant it comes and goes, she points to the right side of her chest that is nonradicular.    Allergies:  Allergies   Allergen Reactions     Latex Itching, Hives and Rash       Problem List:    Patient Active Problem List    Diagnosis Date Noted     History of colitis 09/10/2020     Priority: Medium     Added automatically from request for surgery [id number]       ADHD, predominantly inattentive type 2020     Priority: Medium     Formatting of this note might be different from the original.  Diagnosed 2020       Depression 2019     Priority: Medium     Chronic neck and back pain 2019     Priority: Medium     Malignant neoplasm of left breast (H) 2019     Priority: Medium     Formatting of this note might be different from the original.  Followed by oncology       Abnormal uterine bleeding 11/10/2018     Priority: Medium     Mild intermittent asthma 10/02/2017     Priority: Medium     Family history of uterine cancer 2015     Priority: Medium     Formatting of this note might be different from the original.  Mother, Stalin Goins, CELE 42  MR#[id number] Uterine Cancer age 49 & Brother with colon cancer  Formatting of this note might be different from the original.  Overview:   Mother, CELE Reinoso 42  MR#[id number] Uterine Cancer age 49 &  Brother with colon cancer       Asthma 06/07/2013     Priority: Medium        Past Medical History:    No past medical history on file.    Past Surgical History:    Past Surgical History:   Procedure Laterality Date     COLONOSCOPY N/A 9/24/2020    Procedure: diagnostic colonoscopy with biopsy;  Surgeon: Bong Olson MD;  Location: [facility]       Family History:    No family history on file.    Social History:  Marital Status:  Single [1]  Social History     Tobacco Use     Smoking status: Former Smoker     Smokeless tobacco: Never Used   Substance Use Topics     Alcohol use: Not Currently     Comment: rare     Drug use: Not Currently        Medications:    albuterol (PROAIR HFA/PROVENTIL HFA/VENTOLIN HFA) 108 (90 Base) MCG/ACT inhaler  alum & mag hydroxide-simethicone (MAALOX MAX) 400-400-40 MG/5ML SUSP suspension  benzoyl peroxide 2.5 % GEL  cholecalciferol (VITAMIN D3) 25 mcg (1000 units) capsule  clindamycin (CLEOCIN T) 1 % external lotion  famotidine (PEPCID) 20 MG tablet  ketoconazole (NIZORAL) 2 % external shampoo  loratadine (CLARITIN) 10 MG tablet  meloxicam (MOBIC) 15 MG tablet  methocarbamol (ROBAXIN) 750 MG tablet  ondansetron (ZOFRAN-ODT) 4 MG ODT tab          Review of Systems patient denies chills or sweats, headache, blurry vision or double vision, runny nose or sore throat, difficulty swallowing, patient denies a feeling of palpitations, she occasionally has shortness of breath that she does have asthma, she has not had a cough, she has no abdominal pain, she is not nauseous, she has no changes in bowel or bladder habits.  She has no history of clots in her lungs or her legs, she has not coughed up any blood she has no unilateral calf swelling or tenderness she has no pain radiating to her back.    Physical Exam   BP: (!) 158/104  Pulse: 73  Temp: 97.4  F (36.3  C)  Resp: 20  SpO2: 98 %      Physical Exam  General: alert, oriented to person, place, time  Head: atraumatic  Eyes: PERRL, corneas clear  and conjunctivae clear  Nose: no rhinorrhea/nasal discharge  Mouth/Throat: no exudates, no erythema, no tonsillar enlargement, structures midline and no hoarseness  Neck: no tenderness, supple and no adenopathy  Chest/Pulmonary: chest clear with equal lung sounds bilaterally, no chest wall tenderness or deformities, no tachypnea and no cyanosis  Cardiovascular: S1, S2 normal, regular rate and rhythm, no murmur and no pedal edema  Abdomen: soft, non-tender, no guarding, no rebound tenderness  Back/Spine:  No tenderness, no step-offs, no obvious deformities.  Musculoskeletal/Extremities: normal extremities, no edema, erythema, tenderness and 5/5 strength in all 4 extremities  Skin: no diaphoresis and skin color normal  Neuro: speech clear and gait stable  Psychiatric: [place] elated, cooperative.    ED Course   MDM: Differential diagnosis includes was not limited to: ACS, pneumonia, viral illness which includes COVID-19, AAA, thoracic aneurysm, pulmonary embolism, anxiety, and asthma.    As the patient has no history of clotting in her lungs or legs she has no unilateral calf swelling or tenderness and no deep thigh pain I am less concerned of a PE at this time.  He does not have any ripping or tearing pain in her back and has not smoked cigarettes in over 20 years smokes less concerned of a AAA she has not had any neurological changes which makes me less concerned of a thoracic aneurysm at this time.  She is not hypoxic at this time which is also reassuring and shows no signs of COVID-19.    This point will obtain a CBC, BMP, troponin as well as a chest x-ray and a twelve-lead EKG.  Pending negative work-up patient will be discharged home.           ED Course as of 12/22/21 1233   Wed Dec 22, 2021   1123 Normal sinus rhythm, there is no ST elevation there is no ST depression, there is no ectopy, QRS duration is 92 QTC is 436 is reassuring.   1138 Basic metabolic panel(!)  Reassuring electrolytes appropriate kidney  function.   1140 Troponin I  Troponin of 8 this is less than 54 this is reassuring.   1150 XR Chest 2 Views  IMPRESSION: No acute infiltrate.   1228 CBC with platelets differential  CBC is reassuring.   1233 Skin exam shows no erythematous or swelling or cellulitic changes to the skin in her chest.     Procedures              EKG Interpretation:      Interpreted by Josh Main PA-C  Time reviewed: 1130  Symptoms at time of EKG: None  Rhythm: normal sinus   Rate: normal  Axis: normal  Ectopy: none  Conduction: normal  ST Segments/ T Waves: No ST-T wave changes  Q Waves: none  Comparison to prior: Unchanged    Clinical Impression: normal EKG           Results for orders placed or performed during the hospital encounter of 12/22/21 (from the past 24 hour(s))   CBC with platelets differential    Narrative    The following orders were created for panel order CBC with platelets differential.  Procedure                               Abnormality         Status                     ---------                               -----------         ------                     CBC with platelets and d...[[id number]]                      Final result                 Please view results for these tests on the individual orders.   Basic metabolic panel   Result Value Ref Range    Sodium 142 133 - 144 mmol/L    Potassium 3.9 3.4 - 5.3 mmol/L    Chloride 111 (H) 94 - 109 mmol/L    Carbon Dioxide (CO2) 26 20 - 32 mmol/L    Anion Gap 5 3 - 14 mmol/L    Urea Nitrogen 14 7 - 30 mg/dL    Creatinine 0.71 0.52 - 1.04 mg/dL    Calcium 8.8 8.5 - 10.1 mg/dL    Glucose 102 (H) 70 - 99 mg/dL    GFR Estimate >90 >60 mL/min/1.73m2   Troponin I   Result Value Ref Range    Troponin I High Sensitivity 8 <54 ng/L   CBC with platelets and differential   Result Value Ref Range    WBC Count 7.3 4.0 - 11.0 10e3/uL    RBC Count 4.81 3.80 - 5.20 10e6/uL    Hemoglobin 14.5 11.7 - 15.7 g/dL    Hematocrit 43.6 35.0 - 47.0 %    MCV 91 78 - 100 fL    MCH 30.1 26.5  - 33.0 pg    MCHC 33.3 31.5 - 36.5 g/dL    RDW 12.4 10.0 - 15.0 %    Platelet Count 233 150 - 450 10e3/uL    % Neutrophils 62 %    % Lymphocytes 27 %    % Monocytes 6 %    % Eosinophils 4 %    % Basophils 1 %    % Immature Granulocytes 0 %    NRBCs per 100 WBC 0 <1 /100    Absolute Neutrophils 4.5 1.6 - 8.3 10e3/uL    Absolute Lymphocytes 1.9 0.8 - 5.3 10e3/uL    Absolute Monocytes 0.5 0.0 - 1.3 10e3/uL    Absolute Eosinophils 0.3 0.0 - 0.7 10e3/uL    Absolute Basophils 0.1 0.0 - 0.2 10e3/uL    Absolute Immature Granulocytes 0.0 <=0.4 10e3/uL    Absolute NRBCs 0.0 10e3/uL   XR Chest 2 Views    Narrative    PROCEDURE: XR CHEST 2 VW 12/22/2021 11:32 AM    HISTORY: chest pain    COMPARISONS: None.    TECHNIQUE: 2 views.    FINDINGS: Heart appears mildly enlarged. No vascular congestion or  interstitial edema is seen. There is no confluent infiltrate or  pleural effusion.    Mild degenerative changes seen in the spine.         Impression    IMPRESSION: No acute infiltrate.    CARMELO RICHARDSON MD         SYSTEM ID:  [id number]       Medications - No data to display    Assessments & Plan (with Medical Decision Making)     I have reviewed the nursing notes.    I have reviewed the findings, diagnosis, plan and need for follow up with the patient.    New Prescriptions    No medications on file       Final diagnoses:   Chest pain       12/22/2021   [facility]     Josh Main PA-C  12/22/21 1128       Josh Main PA-C  12/22/21 1230       Josh Main PA-C  12/22/21 1233

## 2021-12-22 NOTE — ED NOTES
Patient reports chest pain that is intermittent that has gotten worse since last night. Patient reports that pain was sharp and is now dull. Patient denies any chest pain at this time. Patient reports that she has had chest pain before, but nothing like this.

## 2021-12-22 NOTE — ED TRIAGE NOTES
Midsternal pain intermittent for a few days, no hx of this pain before.  SOB mild but worsening today. Feels anxious today about symptoms as they seem to be getting worse.

## 2021-12-22 NOTE — DISCHARGE INSTRUCTIONS
You were seen in the emergency department for chest pain.  Your chest x-ray was reassuring.  Your blood work showed no elevation in your cardiac enzyme.  Your blood work also showed no signs of systemic infection, anemia, abnormal electrolytes, or inappropriate kidney function.  Your EKG was also reassuring.  It is recommended that you follow-up with your primary care provider this week.  If you start having severe chest pain or shortness of breath please return to emergency department.

## 2022-01-18 ENCOUNTER — APPOINTMENT (OUTPATIENT)
Dept: GENERAL RADIOLOGY | Facility: HOSPITAL | Age: 52
End: 2022-01-18
Attending: NURSE PRACTITIONER
Payer: COMMERCIAL

## 2022-01-18 ENCOUNTER — HOSPITAL ENCOUNTER (EMERGENCY)
Facility: HOSPITAL | Age: 52
Discharge: HOME | End: 2022-01-18
Attending: NURSE PRACTITIONER | Admitting: NURSE PRACTITIONER
Payer: COMMERCIAL

## 2022-01-18 VITALS
HEART RATE: 97 BPM | DIASTOLIC BLOOD PRESSURE: 92 MMHG | RESPIRATION RATE: 16 BRPM | OXYGEN SATURATION: 95 % | SYSTOLIC BLOOD PRESSURE: 150 MMHG | TEMPERATURE: 99.9 F

## 2022-01-18 DIAGNOSIS — J06.9 UPPER RESPIRATORY TRACT INFECTION, UNSPECIFIED TYPE: ICD-10-CM

## 2022-01-18 DIAGNOSIS — J06.9 URI (UPPER RESPIRATORY INFECTION): ICD-10-CM

## 2022-01-18 DIAGNOSIS — U07.1 LAB TEST POSITIVE FOR DETECTION OF COVID-19 VIRUS: ICD-10-CM

## 2022-01-18 DIAGNOSIS — R07.89 CHEST TIGHTNESS: ICD-10-CM

## 2022-01-18 PROCEDURE — C9803 HOPD COVID-19 SPEC COLLECT: HCPCS

## 2022-01-18 PROCEDURE — G0463 HOSPITAL OUTPT CLINIC VISIT: HCPCS | Mod: 25

## 2022-01-18 PROCEDURE — 99214 OFFICE O/P EST MOD 30 MIN: CPT | Performed by: NURSE PRACTITIONER

## 2022-01-18 PROCEDURE — 87637 SARSCOV2&INF A&B&RSV AMP PRB: CPT | Performed by: NURSE PRACTITIONER

## 2022-01-18 PROCEDURE — 71046 X-RAY EXAM CHEST 2 VIEWS: CPT

## 2022-01-18 RX ORDER — ALBUTEROL SULFATE 90 UG/1
2 AEROSOL, METERED RESPIRATORY (INHALATION) EVERY 4 HOURS PRN
Qty: 8.5 G | Refills: 0 | Status: SHIPPED | OUTPATIENT
Start: 2022-01-18 | End: 2023-03-21

## 2022-01-18 RX ORDER — BENZONATATE 100 MG/1
200 CAPSULE ORAL 3 TIMES DAILY PRN
Qty: 12 CAPSULE | Refills: 0 | Status: SHIPPED | OUTPATIENT
Start: 2022-01-18 | End: 2022-01-21

## 2022-01-18 RX ORDER — INHALER, ASSIST DEVICES
SPACER (EA) MISCELLANEOUS
Qty: 1 EACH | Refills: 0 | Status: SHIPPED | OUTPATIENT
Start: 2022-01-18

## 2022-01-18 ASSESSMENT — ENCOUNTER SYMPTOMS
SORE THROAT: 1
NAUSEA: 0
CHILLS: 1
COUGH: 1
HEADACHES: 1
SINUS PAIN: 0
TROUBLE SWALLOWING: 0
FATIGUE: 1
RHINORRHEA: 1
MYALGIAS: 1
LIGHT-HEADEDNESS: 0
SHORTNESS OF BREATH: 1
EYES NEGATIVE: 1
CHEST TIGHTNESS: 1
VOMITING: 0
FEVER: 1
SINUS PRESSURE: 0
DIARRHEA: 0
APPETITE CHANGE: 0
ACTIVITY CHANGE: 1
DIZZINESS: 1

## 2022-01-18 NOTE — ED TRIAGE NOTES
Pt presents with c/o body aches, headache, coughing, chest tightness, also asthma has been acting up. Sx started yesterday. Pt is unsure of covid exposure. Pt is not vaccinated. Pt has not taken any otc meds. Pt has been taking her inhaler. Pt would like a refill on inhaler.

## 2022-01-18 NOTE — DISCHARGE INSTRUCTIONS
Increase oral intake, cool mist vaporizer as needed, rest, avoid sharing utensils, practice good hand washing techniques, cover mouth when you cough and sneeze. Over the counter medications such acetaminophen for fever and generalized aches and pains. Tylenol 650 to 1000 mg every four to six hours as needed (not to exceed more than 4000 mg in a 24 hour period). May use interchangeably. Robitussin (guaifenesin) for cough. Chest rubs such as Chirag's or Mentholatum may help reduce sore throat symptoms.  Chloraseptic spray for sore throat or menthol lozenges may be helpful for sore throat. Be reevaluated if symptoms persist longer than 10 - 14 days or worsen and if there is no improvement in 72 hours or worsening of symptoms.  Increase fluids.     Loratadine (Claritin) or cetirizine (Zyrtec) 20 mg  daily for ten to fourteen days to see if symptoms lessen or resolve. If the medication seems to help you may take 10 mg daily on an ongoing basis.  May buy over the counter.  .  Coricidin HBP for cold symptoms.   Avoid pseudoephedrine and otc products with phenylephrine.  Avoid ibuprofen when you have hypertension as it causes constriction of the arteries in the kidneys and can increase high blood pressure    Fluids, herbs, and foods for sore throat relief -- Adjusting the temperature and texture of foods and beverages may provide local relief of sore throat pain. While data showing benefit are quite limited, these approaches are intuitive. We typically advise these measures since they are likely to be safe with minimal adverse effect, and patients often describe relief of symptoms.  We suggest hydration with frozen (eg, ice or popsicles) or heated liquids (eg, teas, soups), rather than room temperature or refrigerated fluids in patient with significant sore throat pain. Very cold foods can have a numbing-like effect that temporarily reduces or alleviates the pain of swallowing. Ice cubes or frozen popsicles facilitate  hydration; ice cream and frozen yogurt provide caloric intake.  Warm fluids and foods, including teas, soups, and soft non-irritating foods, may be better tolerated by patients with throat pain than irritating foods (eg, rough-textured or spicy foods) or fluids at room temperatures. Foods that coat the throat, including honey and hard candies, can facilitate intake of calories while temporarily relieving throat pain.

## 2022-01-18 NOTE — ED PROVIDER NOTES
History     Chief Complaint   Patient presents with     Cough     Generalized Body Aches     HPI  Liza Goins is a 51 year old female who presents with symptoms of chills, fatigue, fever, runny nose, sore throat, chest tightness, cough, shortness of breath, body aches, dizziness, and headaches that started yesterday.  Took ibuprofen 2 hours ago with minimal relief of symptoms.  No known sick contacts.  Has not had COVID vaccination.  Non-smoker.  Denies nausea, vomiting, and diarrhea.    Allergies:  Allergies   Allergen Reactions     Latex Itching, Hives and Rash       Problem List:    Patient Active Problem List    Diagnosis Date Noted     History of colitis 09/10/2020     Priority: Medium     Added automatically from request for surgery [id number]       ADHD, predominantly inattentive type 2020     Priority: Medium     Formatting of this note might be different from the original.  Diagnosed 2020       Depression 2019     Priority: Medium     Chronic neck and back pain 2019     Priority: Medium     Malignant neoplasm of left breast (H) 2019     Priority: Medium     Formatting of this note might be different from the original.  Followed by oncology       Abnormal uterine bleeding 11/10/2018     Priority: Medium     Mild intermittent asthma 10/02/2017     Priority: Medium     Family history of uterine cancer 2015     Priority: Medium     Formatting of this note might be different from the original.  Mother, CELE Reinoso 42  MR#[id number] Uterine Cancer age 49 & Brother with colon cancer  Formatting of this note might be different from the original.  Overview:   Mother, CELE Reinoso 42  MR#[id number] Uterine Cancer age 49 & Brother with colon cancer       Asthma 2013     Priority: Medium        Past Medical History:    History reviewed. No pertinent past medical history.    Past Surgical History:    Past Surgical History:   Procedure Laterality Date     COLONOSCOPY  N/A 9/24/2020    Procedure: diagnostic colonoscopy with biopsy;  Surgeon: Bong Olson MD;  Location: [facility]       Family History:    History reviewed. No pertinent family history.    Social History:  Marital Status:  Single [1]  Social History     Tobacco Use     Smoking status: Former Smoker     Smokeless tobacco: Never Used   Substance Use Topics     Alcohol use: Not Currently     Comment: rare     Drug use: Not Currently        Medications:    albuterol (PROAIR HFA) 108 (90 Base) MCG/ACT inhaler  albuterol (PROAIR HFA/PROVENTIL HFA/VENTOLIN HFA) 108 (90 Base) MCG/ACT inhaler  alum & mag hydroxide-simethicone (MAALOX MAX) 400-400-40 MG/5ML SUSP suspension  benzonatate (TESSALON) 100 MG capsule  benzoyl peroxide 2.5 % GEL  cholecalciferol (VITAMIN D3) 25 mcg (1000 units) capsule  clindamycin (CLEOCIN T) 1 % external lotion  famotidine (PEPCID) 20 MG tablet  ketoconazole (NIZORAL) 2 % external shampoo  loratadine (CLARITIN) 10 MG tablet  meloxicam (MOBIC) 15 MG tablet  methocarbamol (ROBAXIN) 750 MG tablet  ondansetron (ZOFRAN-ODT) 4 MG ODT tab  spacer (OPTICHAMBER DON) holding chamber          Review of Systems   Constitutional: Positive for activity change, chills, fatigue and fever. Negative for appetite change.   HENT: Positive for rhinorrhea and sore throat. Negative for ear pain, sinus pressure, sinus pain and trouble swallowing.    Eyes: Negative.         Hurting and tired   Respiratory: Positive for cough, chest tightness and shortness of breath.    Gastrointestinal: Negative for diarrhea, nausea and vomiting.   Genitourinary: Negative.    Musculoskeletal: Positive for myalgias.   Skin: Negative.    Neurological: Positive for dizziness and headaches. Negative for light-headedness.       Physical Exam   BP: 150/92  Pulse: 97  Temp: 99.9  F (37.7  C)  Resp: 16  SpO2: 95 %      Physical Exam  Vitals and nursing note reviewed.   Constitutional:       General: She is in acute distress (mild to moderate).       Appearance: She is overweight.   HENT:      Head: Normocephalic.      Right Ear: Tympanic membrane and ear canal normal.      Left Ear: Tympanic membrane and ear canal normal.      Nose: Nose normal.      Right Sinus: No maxillary sinus tenderness or frontal sinus tenderness.      Left Sinus: No maxillary sinus tenderness or frontal sinus tenderness.      Mouth/Throat:      Lips: Pink.      Mouth: Mucous membranes are moist.      Pharynx: Uvula midline. No posterior oropharyngeal erythema.      Comments: Dry nonproductive cough    Moderate amount of translucent post nasal drip noted posterior oropharynx    Eyes:      Conjunctiva/sclera: Conjunctivae normal.   Cardiovascular:      Rate and Rhythm: Normal rate and regular rhythm.      Heart sounds: Normal heart sounds. No murmur heard.      Pulmonary:      Effort: Pulmonary effort is normal. No respiratory distress.      Breath sounds: Normal breath sounds. No wheezing.   Lymphadenopathy:      Cervical: No cervical adenopathy.   Skin:     General: Skin is warm and dry.   Neurological:      Mental Status: She is alert and oriented to person, place, and time.   Psychiatric:         Behavior: Behavior normal.         ED Course              ED Course as of 01/18/22 1836   Tue Jan 18, 2022   1802 Chest XR,  PA & LAT     Procedures           Results for orders placed or performed during the hospital encounter of 01/18/22 (from the past 24 hour(s))   Chest XR,  PA & LAT    Narrative    XR CHEST 2 VW    HISTORY: 51 years Female shortness of breath, history asthma. low  grade fever and cough    COMPARISON: 12/22/2021    TECHNIQUE: 2 views of the chest were obtained.    FINDINGS: Two views of the chest were obtained. Heart size and  pulmonary vascularity are within normal limits, lungs are clear on  both views. No consolidating air space opacities are present.          Impression    IMPRESSION: Clear chest.    KORINA DIETRICH MD         SYSTEM ID:  [id number]        Medications - No data to display    Assessments & Plan (with Medical Decision Making)     I have reviewed the nursing notes.    I have reviewed the findings, diagnosis, plan and need for follow up with the patient.  (J06.9) URI (upper respiratory infection)    (R07.89) Chest tightness  Comment: 51 year old female who presents with symptoms of chills, fatigue, fever, runny nose, sore throat, chest tightness, cough, shortness of breath, body aches, dizziness, and headaches that started yesterday.  Took ibuprofen 2 hours ago with minimal relief of symptoms.  No known sick contacts.  Has not had COVID vaccination.  Non-smoker.  Denies nausea, vomiting, and diarrhea.    MDM:NHT. Lungs CTA    Multiplex nasopharyngeal swab pending    Chest x-ray reviewed and per radiology: Clear chest    Plan: Refill albuterol inhaler and Tessalon perles 3 times daily as needed. Education provided  For these medications and for viral URI.   Treat symptoms conservatively with acetaminophen and  ibuprofen (if applicable) for fevers, body aches, and headaches, guaifenesin and/or honey for cough. May use chest rubs for sore throat and congestion, hot and cold liquids may help decrease sore throat and help you feel better. Increase fluids.  Loratadine (Claritin) or cetirizine (Zyrtec) 20 mg  daily for ten to fourteen days to see if symptoms lessen or resolve. If the medication seems to help you may take 10 mg daily on an ongoing basis.  May buy over the counter.  .  Coricidin HBP for cold symptoms.   Avoid pseudoephedrine and otc products with phenylephrine.  Avoid ibuprofen when you have hypertension as it causes constriction of the arteries in the kidneys and can increase high blood pressure  Return to be reevaluated by ER/UC or your primary care provider if symptoms worsen, you develop breathing difficulties, or you do not improve in a reasonable time frame. It can take several days for a cough to resolve. It can take ten to fourteen  days for upper respiratory symptoms to resolve.     These discharge instructions and medications were reviewed with her and understanding verbalized.    This document was prepared using a combination of typing and voice generated software.  While every attempt was made for accuracy, spelling and grammatical errors may exist.    Discharge Medication List as of 1/18/2022  6:03 PM      START taking these medications    Details   !! albuterol (PROAIR HFA) 108 (90 Base) MCG/ACT inhaler Inhale 2 puffs into the lungs every 4 hours as needed for shortness of breath / dyspnea, Disp-8.5 g, R-0, E-Prescribe      benzonatate (TESSALON) 100 MG capsule Take 2 capsules (200 mg) by mouth 3 times daily as needed for cough One to two tabs tid prn, Disp-12 capsule, R-0, E-Prescribe      spacer (OPTICHAMBER DON) holding chamber Use with inhaler, Disp-1 each, R-0, E-Prescribe       !! - Potential duplicate medications found. Please discuss with provider.          Final diagnoses:   URI (upper respiratory infection)   Chest tightness       1/18/2022   [facility]       Elba Ferris, CNP  01/18/22 [id number]

## 2022-01-19 ENCOUNTER — VIRTUAL VISIT (OUTPATIENT)
Dept: FAMILY MEDICINE | Facility: OTHER | Age: 52
End: 2022-01-19
Attending: FAMILY MEDICINE
Payer: COMMERCIAL

## 2022-01-19 ENCOUNTER — TELEPHONE (OUTPATIENT)
Dept: EMERGENCY MEDICINE | Facility: HOSPITAL | Age: 52
End: 2022-01-19
Payer: COMMERCIAL

## 2022-01-19 DIAGNOSIS — U07.1 INFECTION DUE TO 2019 NOVEL CORONAVIRUS: Primary | ICD-10-CM

## 2022-01-19 LAB
FLUAV RNA SPEC QL NAA+PROBE: NEGATIVE
FLUBV RNA RESP QL NAA+PROBE: NEGATIVE
RSV RNA SPEC NAA+PROBE: NEGATIVE
SARS-COV-2 RNA RESP QL NAA+PROBE: POSITIVE

## 2022-01-19 PROCEDURE — 99212 OFFICE O/P EST SF 10 MIN: CPT | Mod: 95 | Performed by: FAMILY MEDICINE

## 2022-01-19 ASSESSMENT — PAIN SCALES - GENERAL: PAINLEVEL: NO PAIN (0)

## 2022-01-19 NOTE — TELEPHONE ENCOUNTER
"Coronavirus (COVID-19) Notification    Caller Name (Patient, parent, daughter/son, grandparent, etc)  The patient    Reason for call  Notify of Positive Coronavirus (COVID-19) lab results, assess symptoms,  review  [facility] recommendations    Lab Result    Lab test:  2019-nCoV rRt-PCR or SARS-CoV-2 PCR    Oropharyngeal AND/OR nasopharyngeal swabs is POSITIVE for 2019-nCoV RNA/SARS-COV-2 PCR (COVID-19 virus)    RN Recommendations/Instructions per [facility] Coronavirus COVID-19 recommendations    Brief introduction script  Introduce self then review script:  \"I am calling on behalf of [facility].  We were notified that your Coronavirus test (COVID-19) for was POSITIVE for the virus.  I have some information to relay to you but first I wanted to mention that the [place] Dept of Health will be contacting you shortly [it's possible MD already called Patient] to talk to you more about how you are feeling and other people you have had contact with who might now also have the virus.  Also,  [facility] is Partnering with the [facility] for Covid-19 research, you may be contacted directly by research staff.\"      Assessment (Inquire about Patient's current symptoms)   Assessment   Current Symptoms at time of phone call: (if no symptoms, document No symptoms] Body aches, cough,chills  states my asthma is not controlled due to coughing.   Symptoms onset (if applicable) 1 day ago     If at time of call, Patients symptoms hare worsened, the Patient should contact 911 or have someone drive them to Emergency Dept promptly:      If Patient calling 911, inform 911 personal that you have tested positive for the Coronavirus (COVID-19).  Place mask on and await 911 to arrive.    If Emergency Dept, If possible, please have another adult drive you to the Emergency Dept but you need to wear mask when in contact with other people.          Treatment Options:   Patient classified as COVID treatment eligible by " Epic high risk algorithm: Yes  Is the patient symptomatic at the time of result notification? Yes. Was the onset of symptoms within the last 5 days? Yes.   Inform patient they may be eligible for a therapeutic treatment option that may reduce complications and hospitalization risk related to COVID19. These options would be discussed during a virtual visit with a provider.   Does the patient agree to have a visit with a provider to discuss treatment options? Yes. Is the patient seen at [facility]?  Yes: Warm transfer to [phone] ([id number])    Review information with Patient    Your result was positive. This means you have COVID-19 (coronavirus).  We have sent you a letter that reviews the information that I'll be reviewing with you now.    How can I protect others?    If you have symptoms: stay home and away from others (self-isolate) until:    You've had no fever--and no medicine that reduces fever--for 1 full day (24 hours). And       Your other symptoms have gotten better. For example, your cough or breathing has improved. And     At least 10 days have passed since your symptoms started. (If you've been told by a doctor that you have a weak immune system, wait 20 days.)     If you don't have symptoms: Stay home and away from others (self-isolate) until at least 10 days have passed since your first positive COVID-19 test. (Date test collected)    During this time:    Stay in your own room, including for meals. Use your own bathroom if you can.    Stay away from others in your home. No hugging, kissing or shaking hands. No visitors.     Don't go to work, school or anywhere else.     Clean  high touch  surfaces often (doorknobs, counters, handles, etc.). Use a household cleaning spray or wipes. You'll find a full list on the EPA website at www.epa.gov/pesticide-registration/list-n-disinfectants-use-against-sars-cov-2.     Cover your mouth and nose with a mask, tissue or other face covering to avoid spreading  germs.    Wash your hands and face often with soap and water.    Make a list of people you have been in close contact with recently, even if either of you wore a face covering.   - Start your list from 2 days before you became ill or had a positive test.  - Include anyone that was within 6 feet of you for a cumulative total of 15 minutes or more in 24 hours. (Example: if you sat next to Dawit for 5 minutes in the morning and 10 minutes in the afternoon, then you were in close contact for 15 minutes total that day. Dawit would be added to your list.)    A public health worker will call or text you. It is important that you answer. They will ask you questions about possible exposures to COVID-19, such as people you have been in direct contact with and places you have visited.    Tell the people on your list that you have COVID-19; they should stay away from others for 14 days starting from the last time they were in contact with you (unless you are told something different from a public health worker).     Caregivers in these groups are at risk for severe illness due to COVID-19:  o People 65 years and older  o People who live in a nursing home or long-term care facility  o People with chronic disease (lung, heart, cancer, diabetes, kidney, liver, immunologic)  o People who have a weakened immune system, including those who:  - Are in cancer treatment  - Take medicine that weakens the immune system, such as corticosteroids  - Had a bone marrow or organ transplant  - Have an immune deficiency  - Have poorly controlled HIV or AIDS  - Are obese (body mass index of 40 or higher)  - Smoke regularly    Caregivers should wear gloves while washing dishes, handling laundry and cleaning bedrooms and bathrooms.    Wash and dry laundry with special caution. Don't shake dirty laundry, and use the warmest water setting you can.    If you have a weakened immune system, ask your doctor about other actions you should take.    For more  tips, go to www.cdc.gov/coronavirus/2019-ncov/downloads/10Things.pdf.    You should not go back to work until you meet the guidelines above for ending your home isolation. You don't need to be retested for COVID-19 before going back to work--studies show that you won't spread the virus if it's been at least 10 days since your symptoms started (or 20 days, if you have a weak immune system).    Employers: This document serves as formal notice of your employee's medical guidelines for going back to work. They must meet the above guidelines before going back to work in person.    How can I take care of myself?    1. Get lots of rest. Drink extra fluids (unless a doctor has told you not to).    2. Take Tylenol (acetaminophen) for fever or pain. If you have liver or kidney problems, ask your family doctor if it's okay to take Tylenol.     Take either:     650 mg (two 325 mg pills) every 4 to 6 hours, or     1,000 mg (two 500 mg pills) every 8 hours as needed.     Note: Don't take more than 3,000 mg in one day. Acetaminophen is found in many medicines (both prescribed and over-the-counter medicines). Read all labels to be sure you don't take too much.    For children, check the Tylenol bottle for the right dose (based on their age or weight).    3. If you have other health problems (like cancer, heart failure, an organ transplant or severe kidney disease): Call your specialty clinic if you don't feel better in the next 2 days.    4. Know when to call 911: Emergency warning signs include:    Trouble breathing or shortness of breath    Pain or pressure in the chest that doesn't go away    Feeling confused like you haven't felt before, or not being able to wake up    Bluish-colored lips or face    5. Sign up for [employer]. We know it's scary to hear that you have COVID-19. We want to track your symptoms to make sure you're okay over the next 2 weeks. Please look for an email from [employer]--this is a free, online  program that we'll use to keep in touch. To sign up, follow the link in the email. Learn more at [email or web address].    Where can I get more information?    [facility]: [email or web address]    Coronavirus Basics: www.health.[facility]./diseases/coronavirus/basics.html    What to Do If You're Sick: www.cdc.gov/coronavirus/2019-ncov/about/steps-when-sick.html    Ending Home Isolation: www.cdc.gov/coronavirus/2019-ncov/hcp/disposition-in-home-patients.html     Caring for Someone with COVID-19: www.cdc.gov/coronavirus/2019-ncov/if-you-are-sick/care-for-someone.html     [facility] clinical trials (COVID-19 research studies): [email or web address]-clinical-trials     A Positive COVID-19 letter will be sent via [employer] or the mail. (Exception, no letters sent to Presurgerical/Preprocedure Patients)    Yecenia Gary LPN

## 2022-01-19 NOTE — PROGRESS NOTES
Liza is a 51 year old who is being evaluated via a billable telephone visit.      What phone number would you like to be contacted at? [phone]  How would you like to obtain your AVS?     Assessment & Plan     Infection due to 2019 novel coronavirus  Discussed over the phone pros and cons of oral antiviral therapy for COVID.  Based on her duration of symptoms I think she would be a good candidate for this.  She agrees.  Prescription was sent to the pharmacy.  She will contact me if she starts developing any side effects or has any further questions.  - molnupiravir 200 MG capsule; Take 4 capsules (800 mg) by mouth every 12 hours for 5 days    No follow-ups on file.    Evin Holland  [facility]    Subjective   Liza is a 51 year old who presents for the following health issues    HPI     She has had a cough and body aches for the last 24 hours.  She was seen in [place] and was tested for COVID.  This came back positive.  She is identified through scarring as a potential candidate for oral antiviral therapy.    Review of Systems   Constitutional, HEENT, cardiovascular, pulmonary, gi and gu systems are negative, except as otherwise noted.      Objective           Vitals:  No vitals were obtained today due to virtual visit.    Physical Exam   healthy, alert and no distress  PSYCH: Alert and oriented times 3; coherent speech, normal   rate and volume, able to articulate logical thoughts, able   to abstract reason, no tangential thoughts, no hallucinations   or delusions  Her affect is normal  RESP: No cough, no audible wheezing, able to talk in full sentences  Remainder of exam unable to be completed due to telephone visits          Phone call duration: 7 minutes

## 2022-02-07 ENCOUNTER — OFFICE VISIT (OUTPATIENT)
Dept: ALLERGY | Facility: OTHER | Age: 52
End: 2022-02-07
Attending: NURSE PRACTITIONER
Payer: COMMERCIAL

## 2022-02-07 ENCOUNTER — OFFICE VISIT (OUTPATIENT)
Dept: OTOLARYNGOLOGY | Facility: OTHER | Age: 52
End: 2022-02-07
Attending: NURSE PRACTITIONER
Payer: COMMERCIAL

## 2022-02-07 VITALS
SYSTOLIC BLOOD PRESSURE: 120 MMHG | HEART RATE: 79 BPM | BODY MASS INDEX: 45.8 KG/M2 | WEIGHT: 285 LBS | OXYGEN SATURATION: 91 % | HEIGHT: 66 IN | DIASTOLIC BLOOD PRESSURE: 78 MMHG

## 2022-02-07 DIAGNOSIS — H61.813 EXOSTOSIS OF BOTH EXTERNAL AUDITORY CANALS: ICD-10-CM

## 2022-02-07 DIAGNOSIS — J45.20 MILD INTERMITTENT ASTHMA, UNSPECIFIED WHETHER COMPLICATED: Primary | ICD-10-CM

## 2022-02-07 DIAGNOSIS — J30.89 PERENNIAL ALLERGIC RHINITIS: ICD-10-CM

## 2022-02-07 DIAGNOSIS — H90.3 SENSORINEURAL HEARING LOSS (SNHL) OF BOTH EARS: ICD-10-CM

## 2022-02-07 DIAGNOSIS — L29.9 ITCHING OF EAR: Primary | ICD-10-CM

## 2022-02-07 PROCEDURE — 99213 OFFICE O/P EST LOW 20 MIN: CPT | Mod: 25 | Performed by: NURSE PRACTITIONER

## 2022-02-07 PROCEDURE — G0463 HOSPITAL OUTPT CLINIC VISIT: HCPCS | Mod: 25

## 2022-02-07 PROCEDURE — 95004 PERQ TESTS W/ALRGNC XTRCS: CPT

## 2022-02-07 PROCEDURE — 95024 IQ TESTS W/ALLERGENIC XTRCS: CPT

## 2022-02-07 RX ORDER — HYDROCORTISONE AND ACETIC ACID 20.75; 10.375 MG/ML; MG/ML
4 SOLUTION AURICULAR (OTIC) 2 TIMES DAILY
Qty: 6 ML | Refills: 2 | Status: SHIPPED | OUTPATIENT
Start: 2022-02-07 | End: 2022-02-21

## 2022-02-07 RX ORDER — CETIRIZINE HYDROCHLORIDE 10 MG/1
10 TABLET ORAL DAILY
Qty: 30 TABLET | Refills: 1 | Status: SHIPPED | OUTPATIENT
Start: 2022-02-07 | End: 2022-06-20

## 2022-02-07 RX ORDER — MOMETASONE FUROATE 1 MG/G
CREAM TOPICAL
Qty: 45 G | Refills: 3 | Status: SHIPPED | OUTPATIENT
Start: 2022-02-07 | End: 2023-03-21

## 2022-02-07 ASSESSMENT — PAIN SCALES - GENERAL: PAINLEVEL: MODERATE PAIN (4)

## 2022-02-07 ASSESSMENT — MIFFLIN-ST. JEOR: SCORE: 1921.56

## 2022-02-07 ASSESSMENT — ASTHMA QUESTIONNAIRES
ACT_TOTALSCORE: 20
ACT_TOTALSCORE: 20
QUESTION_3 LAST FOUR WEEKS HOW OFTEN DID YOUR ASTHMA SYMPTOMS (WHEEZING, COUGHING, SHORTNESS OF BREATH, CHEST TIGHTNESS OR PAIN) WAKE YOU UP AT NIGHT OR EARLIER THAN USUAL IN THE MORNING: ONCE OR TWICE
QUESTION_2 LAST FOUR WEEKS HOW OFTEN HAVE YOU HAD SHORTNESS OF BREATH: ONCE OR TWICE A WEEK
QUESTION_4 LAST FOUR WEEKS HOW OFTEN HAVE YOU USED YOUR RESCUE INHALER OR NEBULIZER MEDICATION (SUCH AS ALBUTEROL): NOT AT ALL
QUESTION_1 LAST FOUR WEEKS HOW MUCH OF THE TIME DID YOUR ASTHMA KEEP YOU FROM GETTING AS MUCH DONE AT WORK, SCHOOL OR AT HOME: SOME OF THE TIME
QUESTION_5 LAST FOUR WEEKS HOW WOULD YOU RATE YOUR ASTHMA CONTROL: WELL CONTROLLED

## 2022-02-07 NOTE — LETTER
2/7/2022         RE: Liza Goins  [address] [facility] [address]        Dear Colleague,    Thank you for referring your patient, Liza Goins, to the [facility]. Please see a copy of my visit note below.    Otolaryngology Note         Chief Complaint:     Patient presents with:  Allergies: MQT f/u           History of Present Illness:     Liza Goins is a 51 year old female seen today for follow up MQT.      She did well with allergy testing.  No oral swelling, excessive itching, or wheezing.      She reports that her ears continue to be very itchy.  She has not tried any dilute vinegar washes.  She has known no obstructive exostosis.  She does not take daily AH.  No concerns for hearing change.  Recent audiogram showed normal tymps with Mild HF symmetric SNHL.  No ear pain or drainage.     MQT completed 2/7/22:  Dilution 6 (high) none  Dilution 5 (moderate) thistle, penicillium, cat  Dilution 2 (low) ragweed, pigweed, grass, maple, julio c, cottonwood, Hormodendrum, Fusarium, Helminthosporium, dog, dust    She is not currently interested in immunotherapy.  She is more concerned about the itching in her ears.           Medications:     Current Outpatient Rx   Medication Sig Dispense Refill     acetic acid-hydrocortisone (VOSOL-HC) 1-2 % otic solution Place 4 drops into both ears 2 times daily for 14 days 6 mL 2     albuterol (PROAIR HFA) 108 (90 Base) MCG/ACT inhaler Inhale 2 puffs into the lungs every 4 hours as needed for shortness of breath / dyspnea 8.5 g 0     albuterol (PROAIR HFA/PROVENTIL HFA/VENTOLIN HFA) 108 (90 Base) MCG/ACT inhaler Inhale 1-2 puffs into the lungs       alum & mag hydroxide-simethicone (MAALOX MAX) 400-400-40 MG/5ML SUSP suspension Take 30 mLs by mouth every 4 hours as needed for indigestion or heartburn 355 mL 0     benzoyl peroxide 2.5 % GEL        cetirizine (ZYRTEC) 10 MG tablet Take 1 tablet (10 mg) by mouth daily 30 tablet 1     cholecalciferol  "(VITAMIN D3) 25 mcg (1000 units) capsule Take 1,000 Units by mouth       clindamycin (CLEOCIN T) 1 % external lotion        ketoconazole (NIZORAL) 2 % external shampoo        loratadine (CLARITIN) 10 MG tablet Take 10 mg by mouth       meloxicam (MOBIC) 15 MG tablet Take 1 tablet with food once daily if needed for pain       methocarbamol (ROBAXIN) 750 MG tablet Take 750 mg by mouth        mometasone (ELOCON) 0.1 % external cream Apply a thin film of cream to the affected ear canal (s) twice a day for 2 weeks, then use sparingly as needed only. 45 g 3     ondansetron (ZOFRAN-ODT) 4 MG ODT tab Take 1 tablet (4 mg) by mouth every 8 hours as needed for nausea 15 tablet 0     spacer (OPTICHAMBER DON) holding chamber Use with inhaler 1 each 0     famotidine (PEPCID) 20 MG tablet Take 1 tablet (20 mg) by mouth 2 times daily (Patient not taking: Reported on 2/7/2022) 20 tablet 0            Allergies:     Allergies: Latex          Past Medical History:     History reviewed. No pertinent past medical history.         Past Surgical History:     Past Surgical History:   Procedure Laterality Date     COLONOSCOPY N/A 09/24/2020    Procedure: diagnostic colonoscopy with biopsy;  Surgeon: Bong Olson MD;  Location: [facility] OR     MASTECTOMY, BILATERAL Bilateral 2018       ENT family history reviewed         Social History:     Social History     Tobacco Use     Smoking status: Former Smoker     Smokeless tobacco: Never Used   Vaping Use     Vaping Use: Never used   Substance Use Topics     Alcohol use: Not Currently     Comment: rare     Drug use: Not Currently            Review of Systems:     ROS: See HPI         Physical Exam:     /78 (BP Location: Right arm, Patient Position: Sitting, Cuff Size: Adult Large)   Pulse 79   Ht 1.664 m (5' 5.5\")   Wt 129.3 kg (285 lb)   SpO2 91%   BMI 46.71 kg/m      General - The patient is well nourished and well developed, and appears to have good nutritional status.  Alert and " oriented to person and place, answers questions and cooperates with examination appropriately.   Head and Face - Normocephalic and atraumatic, with no gross asymmetry noted.  The facial nerve is intact, with strong symmetric movements.  Voice and Breathing - The patient was breathing comfortably without the use of accessory muscles. There was no wheezing, stridor. The patients voice was clear and strong, and had appropriate pitch and quality.  Ears - External ear normal. Bilateral canals have extensive exostosis, left > right.   Approximately 75% of right TM is visualized, the visualized portion is intact without effusion, retraction or mass.  Approximately 30% of left TM is visualized due to exostosis, the visualized portion is intact without effusion, retraction or mass.  Eyes - Extraocular movements intact, sclera were not icteric or injected, conjunctiva were pink and moist.  Mouth - Examination of the oral cavity showed pink, healthy oral mucosa. Dentition in good condition. No lesions or ulcerations noted. The tongue was mobile and midline.   Throat - The walls of the oropharynx were smooth, pink, moist, symmetric, and had no lesions or ulcerations.  The tonsillar pillars and soft palate were symmetric. The uvula was midline on elevation.    Neck - No worrisome palpable lymphadenopathy, thyroid is smooth.   Nose - External contour is symmetric, no gross deflection or scars.  Nasal mucosa is pink and moist with no abnormal mucus.  The septum and turbinates were evaluated with nasal speculum, no polyps, masses, or purulence noted on examination of anterior nasal cavity.          Assessment and Plan:       ICD-10-CM    1. Itching of ear  L29.9 acetic acid-hydrocortisone (VOSOL-HC) 1-2 % otic solution     mometasone (ELOCON) 0.1 % external cream     cetirizine (ZYRTEC) 10 MG tablet   2. Exostosis of both external auditory canals  H61.813    3. Sensorineural hearing loss (SNHL) of both ears  H90.3      Work on  keeping your fingers out of your ears, no q-tips  Start Vosol HC drops for itchy ears  Start zyrtec 10 mg daily for itchy ears  Start elocon ointment in the opening of both ears to help with ear itching  Work on allergy avoidance    Follow up as needed     Bernie NASH  [facility]          Again, thank you for allowing me to participate in the care of your patient.        Sincerely,        Bernie Ruth, NP

## 2022-02-07 NOTE — PROGRESS NOTES
Otolaryngology Note         Chief Complaint:     Patient presents with:  Allergies: MQT f/u           History of Present Illness:     Liza Goins is a 51 year old female seen today for follow up MQT.      She did well with allergy testing.  No oral swelling, excessive itching, or wheezing.      She reports that her ears continue to be very itchy.  She has not tried any dilute vinegar washes.  She has known no obstructive exostosis.  She does not take daily AH.  No concerns for hearing change.  Recent audiogram showed normal tymps with Mild HF symmetric SNHL.  No ear pain or drainage.     MQT completed 2/7/22:  Dilution 6 (high) none  Dilution 5 (moderate) thistle, penicillium, cat  Dilution 2 (low) ragweed, pigweed, grass, maple, julio c, cottonwood, Hormodendrum, Fusarium, Helminthosporium, dog, dust    She is not currently interested in immunotherapy.  She is more concerned about the itching in her ears.           Medications:     Current Outpatient Rx   Medication Sig Dispense Refill     acetic acid-hydrocortisone (VOSOL-HC) 1-2 % otic solution Place 4 drops into both ears 2 times daily for 14 days 6 mL 2     albuterol (PROAIR HFA) 108 (90 Base) MCG/ACT inhaler Inhale 2 puffs into the lungs every 4 hours as needed for shortness of breath / dyspnea 8.5 g 0     albuterol (PROAIR HFA/PROVENTIL HFA/VENTOLIN HFA) 108 (90 Base) MCG/ACT inhaler Inhale 1-2 puffs into the lungs       alum & mag hydroxide-simethicone (MAALOX MAX) 400-400-40 MG/5ML SUSP suspension Take 30 mLs by mouth every 4 hours as needed for indigestion or heartburn 355 mL 0     benzoyl peroxide 2.5 % GEL        cetirizine (ZYRTEC) 10 MG tablet Take 1 tablet (10 mg) by mouth daily 30 tablet 1     cholecalciferol (VITAMIN D3) 25 mcg (1000 units) capsule Take 1,000 Units by mouth       clindamycin (CLEOCIN T) 1 % external lotion        ketoconazole (NIZORAL) 2 % external shampoo        loratadine (CLARITIN) 10 MG tablet Take 10 mg by mouth       meloxicam  "(MOBIC) 15 MG tablet Take 1 tablet with food once daily if needed for pain       methocarbamol (ROBAXIN) 750 MG tablet Take 750 mg by mouth        mometasone (ELOCON) 0.1 % external cream Apply a thin film of cream to the affected ear canal (s) twice a day for 2 weeks, then use sparingly as needed only. 45 g 3     ondansetron (ZOFRAN-ODT) 4 MG ODT tab Take 1 tablet (4 mg) by mouth every 8 hours as needed for nausea 15 tablet 0     spacer (OPTICHAMBER DON) holding chamber Use with inhaler 1 each 0     famotidine (PEPCID) 20 MG tablet Take 1 tablet (20 mg) by mouth 2 times daily (Patient not taking: Reported on 2/7/2022) 20 tablet 0            Allergies:     Allergies: Latex          Past Medical History:     History reviewed. No pertinent past medical history.         Past Surgical History:     Past Surgical History:   Procedure Laterality Date     COLONOSCOPY N/A 09/24/2020    Procedure: diagnostic colonoscopy with biopsy;  Surgeon: Bong Olson MD;  Location: [facility]     MASTECTOMY, BILATERAL Bilateral 2018       ENT family history reviewed         Social History:     Social History     Tobacco Use     Smoking status: Former Smoker     Smokeless tobacco: Never Used   Vaping Use     Vaping Use: Never used   Substance Use Topics     Alcohol use: Not Currently     Comment: rare     Drug use: Not Currently            Review of Systems:     ROS: See HPI         Physical Exam:     /78 (BP Location: Right arm, Patient Position: Sitting, Cuff Size: Adult Large)   Pulse 79   Ht 1.664 m (5' 5.5\")   Wt 129.3 kg (285 lb)   SpO2 91%   BMI 46.71 kg/m      General - The patient is well nourished and well developed, and appears to have good nutritional status.  Alert and oriented to person and place, answers questions and cooperates with examination appropriately.   Head and Face - Normocephalic and atraumatic, with no gross asymmetry noted.  The facial nerve is intact, with strong symmetric movements.  Voice and " Breathing - The patient was breathing comfortably without the use of accessory muscles. There was no wheezing, stridor. The patients voice was clear and strong, and had appropriate pitch and quality.  Ears - External ear normal. Bilateral canals have extensive exostosis, left > right.   Approximately 75% of right TM is visualized, the visualized portion is intact without effusion, retraction or mass.  Approximately 30% of left TM is visualized due to exostosis, the visualized portion is intact without effusion, retraction or mass.  Eyes - Extraocular movements intact, sclera were not icteric or injected, conjunctiva were pink and moist.  Mouth - Examination of the oral cavity showed pink, healthy oral mucosa. Dentition in good condition. No lesions or ulcerations noted. The tongue was mobile and midline.   Throat - The walls of the oropharynx were smooth, pink, moist, symmetric, and had no lesions or ulcerations.  The tonsillar pillars and soft palate were symmetric. The uvula was midline on elevation.    Neck - No worrisome palpable lymphadenopathy, thyroid is smooth.   Nose - External contour is symmetric, no gross deflection or scars.  Nasal mucosa is pink and moist with no abnormal mucus.  The septum and turbinates were evaluated with nasal speculum, no polyps, masses, or purulence noted on examination of anterior nasal cavity.          Assessment and Plan:       ICD-10-CM    1. Itching of ear  L29.9 acetic acid-hydrocortisone (VOSOL-HC) 1-2 % otic solution     mometasone (ELOCON) 0.1 % external cream     cetirizine (ZYRTEC) 10 MG tablet   2. Exostosis of both external auditory canals  H61.813    3. Sensorineural hearing loss (SNHL) of both ears  H90.3      Work on keeping your fingers out of your ears, no q-tips  Start Vosol HC drops for itchy ears  Start zyrtec 10 mg daily for itchy ears  Start elocon ointment in the opening of both ears to help with ear itching  Work on allergy avoidance    Follow up as  needed     Bernie Ruth NP-C  [facility]

## 2022-02-07 NOTE — NURSING NOTE
"Chief Complaint   Patient presents with     Allergies     MQT f/u       Initial /78 (BP Location: Right arm, Patient Position: Sitting, Cuff Size: Adult Large)   Pulse 79   Ht 1.664 m (5' 5.5\")   Wt 129.3 kg (285 lb)   SpO2 91%   BMI 46.71 kg/m   Estimated body mass index is 46.71 kg/m  as calculated from the following:    Height as of this encounter: 1.664 m (5' 5.5\").    Weight as of this encounter: 129.3 kg (285 lb).  Medication Reconciliation:     Elias Gudino RN    This patient presents today for allergy skin testing.      Symptoms have included frequent sneezing, with multiple times in a row, PND, watery eyes, runny nose and itchy ears.  She said she had chronic ear infections until about age 6, and then had an adenoidectomy and tonsillectomy.  She neverr had any ear surgeries.  She said she noticed a recurrence of ear infections over the past 2 years. She has symptoms all the time, with no difference in the season.  She has psoriasis on her scalp, and her daughter and father both have it too..  She has asthma and scored 20 on ACT.  No previous sinus surgeries.    This patient lives in an apartment and there is no basement.   She thinks there is some black mold on the wall in  Her bathroom. There is carpet in the home, and also in the bedroom.  She thinks she has electric heat and a portable air conditioner unit that her daughter gave her.        This patient has 2 dogs and they occasionally sleep with her.    No previous allergy testing in the past.    This patient's medications have been reviewed prior to testing and all appropriate medications have been stopped.    Consent is signed by patient and signature is verified.     MQT/ID test is performed per protocol.  The patient tolerated testing well.  Benadryl gel applied to each site post testing per protocol.  Chewable Claritin 10 mg given after for post test itch per protocol.  All findings are recorded on the paper flow sheet. Results are " reviewed with this patient.  They are given written information regarding allergy.       The patient will follow-up with Bernie Ruth CNP for treatment plan.      Elias Gudino RN on 2/7/2022 at 10:20 AM

## 2022-02-07 NOTE — PROGRESS NOTES
Liza was seen for allergy skin testing. Patient was seen by this nurse in conjunction with ENT provider. All encounter details are documented in ENT Provider's appointment from this same date. Please see referenced encounter for this visits documentation.     Elias Gudino RN on 2/7/2022 at 10:19 AM

## 2022-02-07 NOTE — PATIENT INSTRUCTIONS
Work on keeping your fingers out of your ears, no q-tips  Start Vosol HC drops for itchy ears  Start zyrtec 10 mg daily for itchy ears  Start elocon ointment in the opening of both ears to help with ear itching    Follow up as needed       Thank you for allowing Bernie NASH and our ENT team to participate in your care.  If your medications are too expensive, please call my nurse at the number listed below.  We can possibly change this medication.    If you have a scheduling or an appointment question please contact our Health Unit Coordinator at their direct line [phone]  ALL nursing questions or concerns can be directed to my Nurse Julianna [phone].

## 2022-06-20 ENCOUNTER — HOSPITAL ENCOUNTER (EMERGENCY)
Facility: HOSPITAL | Age: 52
Discharge: HOME | End: 2022-06-20
Attending: PHYSICIAN ASSISTANT | Admitting: PHYSICIAN ASSISTANT
Payer: COMMERCIAL

## 2022-06-20 VITALS
DIASTOLIC BLOOD PRESSURE: 96 MMHG | HEART RATE: 72 BPM | OXYGEN SATURATION: 96 % | RESPIRATION RATE: 18 BRPM | SYSTOLIC BLOOD PRESSURE: 178 MMHG | TEMPERATURE: 98.6 F

## 2022-06-20 DIAGNOSIS — H10.31 ACUTE CONJUNCTIVITIS OF RIGHT EYE, UNSPECIFIED ACUTE CONJUNCTIVITIS TYPE: ICD-10-CM

## 2022-06-20 PROCEDURE — 99213 OFFICE O/P EST LOW 20 MIN: CPT | Performed by: PHYSICIAN ASSISTANT

## 2022-06-20 PROCEDURE — G0463 HOSPITAL OUTPT CLINIC VISIT: HCPCS

## 2022-06-20 PROCEDURE — 250N000009 HC RX 250: Performed by: PHYSICIAN ASSISTANT

## 2022-06-20 RX ORDER — TETRACAINE HYDROCHLORIDE 5 MG/ML
1-2 SOLUTION OPHTHALMIC ONCE
Status: COMPLETED | OUTPATIENT
Start: 2022-06-20 | End: 2022-06-20

## 2022-06-20 RX ORDER — CETIRIZINE HYDROCHLORIDE 10 MG/1
TABLET ORAL
Qty: 20 TABLET | Refills: 0 | Status: SHIPPED | OUTPATIENT
Start: 2022-06-20

## 2022-06-20 RX ORDER — POLYMYXIN B SULFATE AND TRIMETHOPRIM 1; 10000 MG/ML; [USP'U]/ML
1-2 SOLUTION OPHTHALMIC EVERY 4 HOURS
Qty: 10 ML | Refills: 0 | Status: SHIPPED | OUTPATIENT
Start: 2022-06-20

## 2022-06-20 RX ADMIN — TETRACAINE HYDROCHLORIDE 1 DROP: 5 SOLUTION OPHTHALMIC at 17:00

## 2022-06-20 ASSESSMENT — ENCOUNTER SYMPTOMS
RHINORRHEA: 0
FEVER: 0
PHOTOPHOBIA: 0
EYE REDNESS: 1
RESPIRATORY NEGATIVE: 1
NEUROLOGICAL NEGATIVE: 1
CONSTITUTIONAL NEGATIVE: 1
EYE PAIN: 1

## 2022-06-20 ASSESSMENT — VISUAL ACUITY
OS: 20/40
OD: 20/40

## 2022-06-20 NOTE — DISCHARGE INSTRUCTIONS
Polytrim drops (2-3 drops 4x daily for 7 days).   Warm packs and make sure you wipe any drainage away - wash hands!  Monitor for any surrounding redness of the lids, weird changes of the whites of the eye  Pain with movements of the eye worst headache of life, vision changes  Back here with concern for infection - to ophthalmology with persistent symptoms after 2-3 days OR the inflammation of the white part  Claritin or Zyrtec: double dose twice daily for 3-5 days. (Hives, itching, rashes, allergies) - this would be if you want to tackle the inflammation too.

## 2022-06-20 NOTE — ED TRIAGE NOTES
Patient presents with complaints of right eye pain. States it started this am and it hurts more when she blinks.

## 2022-06-20 NOTE — ED PROVIDER NOTES
History     Chief Complaint   Patient presents with     Eye Pain     HPI  Liza Goins is a 52 year old female who presents with acute onset of right irritation, pain with blinking. Sx started over past 24 hrs. She does not recall any injury or reason for FB. She reports clear drainage/tearing. No fevers. No pain with eye movents. No Headaches, claudication. No notable vision changes.     Allergies:  Allergies   Allergen Reactions     Latex Itching, Hives and Rash       Problem List:    Patient Active Problem List    Diagnosis Date Noted     History of colitis 09/10/2020     Priority: Medium     Added automatically from request for surgery [id number]       ADHD, predominantly inattentive type 2020     Priority: Medium     Formatting of this note might be different from the original.  Diagnosed 2020       Depression 2019     Priority: Medium     Chronic neck and back pain 2019     Priority: Medium     Malignant neoplasm of left breast (H) 2019     Priority: Medium     Formatting of this note might be different from the original.  Followed by oncology       Abnormal uterine bleeding 11/10/2018     Priority: Medium     Mild intermittent asthma 10/02/2017     Priority: Medium     Family history of uterine cancer 2015     Priority: Medium     Formatting of this note might be different from the original.  Mother, Stalin Goins, CELE 42  MR#[id number] Uterine Cancer age 49 & Brother with colon cancer  Formatting of this note might be different from the original.  Overview:   Mother, CELE Reinoso 8-42  MR#[id number] Uterine Cancer age 49 & Brother with colon cancer       Asthma 2013     Priority: Medium        Past Medical History:    No past medical history on file.    Past Surgical History:    Past Surgical History:   Procedure Laterality Date     COLONOSCOPY N/A 2020    Procedure: diagnostic colonoscopy with biopsy;  Surgeon: Bong Olson MD;  Location: [facility]      MASTECTOMY, BILATERAL Bilateral 2018       Family History:    No family history on file.    Social History:  Marital Status:  Single [1]  Social History     Tobacco Use     Smoking status: Former Smoker     Smokeless tobacco: Never Used   Vaping Use     Vaping Use: Never used   Substance Use Topics     Alcohol use: Not Currently     Comment: rare     Drug use: Not Currently        Medications:    cetirizine (ZYRTEC) 10 MG tablet  trimethoprim-polymyxin b (POLYTRIM) 13656-9.1 UNIT/ML-% ophthalmic solution  albuterol (PROAIR HFA) 108 (90 Base) MCG/ACT inhaler  albuterol (PROAIR HFA/PROVENTIL HFA/VENTOLIN HFA) 108 (90 Base) MCG/ACT inhaler  alum & mag hydroxide-simethicone (MAALOX MAX) 400-400-40 MG/5ML SUSP suspension  benzoyl peroxide 2.5 % GEL  cholecalciferol (VITAMIN D3) 25 mcg (1000 units) capsule  clindamycin (CLEOCIN T) 1 % external lotion  famotidine (PEPCID) 20 MG tablet  ketoconazole (NIZORAL) 2 % external shampoo  loratadine (CLARITIN) 10 MG tablet  meloxicam (MOBIC) 15 MG tablet  methocarbamol (ROBAXIN) 750 MG tablet  mometasone (ELOCON) 0.1 % external cream  ondansetron (ZOFRAN-ODT) 4 MG ODT tab  spacer (OPTICHAMBER DON) holding chamber          Review of Systems   Constitutional: Negative.  Negative for fever.   HENT: Negative.  Negative for congestion, ear pain and rhinorrhea.    Eyes: Positive for pain and redness. Negative for photophobia and visual disturbance.   Respiratory: Negative.    Skin: Negative for rash.   Neurological: Negative.        Physical Exam   BP: 178/96  Pulse: 72  Temp: 98.6  F (37  C)  Resp: 18  SpO2: 96 %      Physical Exam  Vitals and nursing note reviewed.   Constitutional:       General: She is not in acute distress.     Appearance: She is not toxic-appearing.   HENT:      Right Ear: Tympanic membrane normal.      Left Ear: Tympanic membrane normal.      Mouth/Throat:      Mouth: Mucous membranes are moist.   Eyes:      General: Lids are everted, no foreign bodies  appreciated.      Extraocular Movements: Extraocular movements intact.      Right eye: Normal extraocular motion.      Left eye: Normal extraocular motion.      Conjunctiva/sclera:      Right eye: Right conjunctiva is injected. No chemosis, exudate or hemorrhage.     Left eye: Left conjunctiva is not injected.      Pupils: Pupils are equal, round, and reactive to light.      Right eye: No corneal abrasion or fluorescein uptake.      Comments: Cobblestoning lower RT lid     Cardiovascular:      Rate and Rhythm: Normal rate.   Pulmonary:      Effort: Pulmonary effort is normal.   Neurological:      Mental Status: She is alert and oriented to person, place, and time.         ED Course     Medications   tetracaine (PONTOCAINE) 0.5 % ophthalmic solution 1-2 drop (1 drop Right Eye Given 6/20/22 1700)       Assessments & Plan (with Medical Decision Making)     I have reviewed the nursing notes.  I have reviewed the findings, diagnosis, plan and need for follow up with the patient.    Discharge Medication List as of 6/20/2022  5:28 PM      START taking these medications    Details   trimethoprim-polymyxin b (POLYTRIM) 35285-0.1 UNIT/ML-% ophthalmic solution Place 1-2 drops into the right eye every 4 hours, Disp-10 mL, R-0, E-Prescribe             Final diagnoses:   Acute conjunctivitis of right eye, unspecified acute conjunctivitis type   There is some cobblestoning of the lower lid, mild edema where she feels pain. Trial of zyrtec and Polytrim empirically. If symptoms do not improve in the next 2-3 days, advised f/u with ophthalmology. Back here/ER with severe HA, pain with eye movements, redness/swelling about the orbit. Pt agreeable to plan and discharged home with reference/written instructions in AVS.     6/20/2022   [facility]     Prashanth Huizar PA  06/20/22 3670

## 2022-06-20 NOTE — ED TRIAGE NOTES
Pt presents with right eye pain that started yesterday. Pt states pain started yesterday and became swollen and red today. Pt does not think anything is in the eye.

## 2022-12-24 ENCOUNTER — HOSPITAL ENCOUNTER (EMERGENCY)
Facility: HOSPITAL | Age: 52
Discharge: HOME | End: 2022-12-25
Attending: EMERGENCY MEDICINE | Admitting: EMERGENCY MEDICINE
Payer: COMMERCIAL

## 2022-12-24 DIAGNOSIS — M71.22 SYNOVIAL CYST OF LEFT POPLITEAL SPACE: ICD-10-CM

## 2022-12-24 PROCEDURE — 99284 EMERGENCY DEPT VISIT MOD MDM: CPT | Mod: 25

## 2022-12-24 PROCEDURE — 93971 EXTREMITY STUDY: CPT | Mod: TC,LT | Performed by: EMERGENCY MEDICINE

## 2022-12-24 PROCEDURE — 99283 EMERGENCY DEPT VISIT LOW MDM: CPT | Mod: 25 | Performed by: EMERGENCY MEDICINE

## 2022-12-24 PROCEDURE — 93971 EXTREMITY STUDY: CPT | Mod: 26 | Performed by: EMERGENCY MEDICINE

## 2022-12-25 ENCOUNTER — APPOINTMENT (OUTPATIENT)
Dept: ULTRASOUND IMAGING | Facility: HOSPITAL | Age: 52
End: 2022-12-25
Attending: EMERGENCY MEDICINE
Payer: COMMERCIAL

## 2022-12-25 VITALS
TEMPERATURE: 97.7 F | RESPIRATION RATE: 16 BRPM | OXYGEN SATURATION: 97 % | HEART RATE: 79 BPM | DIASTOLIC BLOOD PRESSURE: 77 MMHG | SYSTOLIC BLOOD PRESSURE: 136 MMHG

## 2022-12-25 NOTE — ED TRIAGE NOTES
Patient c/o left lower ext pain that began posterior left patella and now extends into left hip.      Triage Assessment     Row Name 12/24/22 8979       Triage Assessment (Adult)    Airway WDL WDL       Respiratory WDL    Respiratory WDL WDL       Skin Circulation/Temperature WDL    Skin Circulation/Temperature WDL WDL       Cardiac WDL    Cardiac WDL WDL       Peripheral/Neurovascular WDL    Peripheral Neurovascular WDL WDL    Capillary Refill, General less than/equal to 3 secs       Cognitive/Neuro/Behavioral WDL    Cognitive/Neuro/Behavioral WDL WDL       Betsy Coma Scale    Best Eye Response 4-->(E4) spontaneous    Best Motor Response 6-->(M6) obeys commands    Best Verbal Response 5-->(V5) oriented    Tyler Coma Scale Score 15

## 2022-12-25 NOTE — DISCHARGE INSTRUCTIONS
You have a Baker's cyst which is causing pain in your left knee.  You need to see an orthopedist, they can do injections or sometimes remove and that will improve your pain.  Take Tylenol and ibuprofen for pain.

## 2022-12-26 ASSESSMENT — ENCOUNTER SYMPTOMS
CHILLS: 0
COUGH: 0
SHORTNESS OF BREATH: 0
FEVER: 0

## 2022-12-27 NOTE — ED PROVIDER NOTES
History     Chief Complaint   Patient presents with     Leg Pain     HPI  Liza Goins is a 52 year old female who is here with left leg pain.  Has been present for a few days.  States it is feeling like it is a mangled.  Pain occasionally shoots up and occasionally shoots down.  Thinks her leg may be a bit swollen compared to the right leg.  Denies any acute injury.  No fever no chills.  No previous procedures to this extremity.  No numbness or weakness.  Pain is not described as shooting or electric.    Allergies:  Allergies   Allergen Reactions     Latex Itching, Hives and Rash       Problem List:    Patient Active Problem List    Diagnosis Date Noted     History of colitis 09/10/2020     Priority: Medium     Added automatically from request for surgery [id number]       ADHD, predominantly inattentive type 2020     Priority: Medium     Formatting of this note might be different from the original.  Diagnosed 2020       Depression 2019     Priority: Medium     Chronic neck and back pain 2019     Priority: Medium     Malignant neoplasm of left breast (H) 2019     Priority: Medium     Formatting of this note might be different from the original.  Followed by oncology       Abnormal uterine bleeding 11/10/2018     Priority: Medium     Mild intermittent asthma 10/02/2017     Priority: Medium     Family history of uterine cancer 2015     Priority: Medium     Formatting of this note might be different from the original.  Mother, CELE Reinoso 42  MR#[id number] Uterine Cancer age 49 & Brother with colon cancer  Formatting of this note might be different from the original.  Overview:   Mother, CELE Reinoso 42  MR#[id number] Uterine Cancer age 49 & Brother with colon cancer       Asthma 2013     Priority: Medium        Past Medical History:    No past medical history on file.    Past Surgical History:    Past Surgical History:   Procedure Laterality Date     COLONOSCOPY N/A  09/24/2020    Procedure: diagnostic colonoscopy with biopsy;  Surgeon: Bong Olson MD;  Location: [facility]     MASTECTOMY, BILATERAL Bilateral 2018       Family History:    No family history on file.    Social History:  Marital Status:  Single [1]  Social History     Tobacco Use     Smoking status: Former     Smokeless tobacco: Never   Vaping Use     Vaping Use: Never used   Substance Use Topics     Alcohol use: Not Currently     Comment: rare     Drug use: Not Currently        Medications:    albuterol (PROAIR HFA) 108 (90 Base) MCG/ACT inhaler  alum & mag hydroxide-simethicone (MAALOX MAX) 400-400-40 MG/5ML SUSP suspension  cetirizine (ZYRTEC) 10 MG tablet  famotidine (PEPCID) 20 MG tablet  mometasone (ELOCON) 0.1 % external cream  ondansetron (ZOFRAN-ODT) 4 MG ODT tab  spacer (OPTICHAMBER DON) holding chamber  albuterol (PROAIR HFA/PROVENTIL HFA/VENTOLIN HFA) 108 (90 Base) MCG/ACT inhaler  benzoyl peroxide 2.5 % GEL  cholecalciferol (VITAMIN D3) 25 mcg (1000 units) capsule  clindamycin (CLEOCIN T) 1 % external lotion  ketoconazole (NIZORAL) 2 % external shampoo  loratadine (CLARITIN) 10 MG tablet  meloxicam (MOBIC) 15 MG tablet  methocarbamol (ROBAXIN) 750 MG tablet  trimethoprim-polymyxin b (POLYTRIM) 34106-2.1 UNIT/ML-% ophthalmic solution          Review of Systems   Constitutional: Negative for chills and fever.   Respiratory: Negative for cough and shortness of breath.    All other systems reviewed and are negative.      Physical Exam   BP: 143/88  Pulse: 85  Temp: 97.7  F (36.5  C)  Resp: 20  SpO2: 98 %      Physical Exam  Constitutional:       General: She is not in acute distress.     Appearance: Normal appearance.   HENT:      Head: Normocephalic and atraumatic.      Right Ear: External ear normal.      Left Ear: External ear normal.      Nose: Nose normal.   Eyes:      General: No scleral icterus.     Extraocular Movements: Extraocular movements intact.   Pulmonary:      Effort: Pulmonary effort  is normal. No respiratory distress.   Musculoskeletal:         General: Tenderness present. No deformity or signs of injury.   Skin:     General: Skin is dry.      Capillary Refill: Capillary refill takes less than 2 seconds.      Coloration: Skin is not jaundiced.   Neurological:      General: No focal deficit present.      Mental Status: She is alert and oriented to person, place, and time.   Psychiatric:         Mood and Affect: Mood normal.         Behavior: Behavior normal.         ED Course                 Procedures    Results for orders placed during the hospital encounter of 12/24/22    POC US FOR DVT UNILATERAL LIMITED    Impression   Procedure Note    Limited Bedside ED Ultrasound for DVT:    PERFORMED BY: Dr. Antwan Sharma MD  INDICATIONS:  leg pain  PROBE: High frequency linear probe  BODY LOCATION:  Left leg  FINDINGS:  Left:  Common femoral vein:  Compressible  Thrombus:  Absent  Superficial femoral vein:  Compressible  Thrombus:  Absent  Popliteal vein: Compressible  Thrombus:  Absent    INTERPRETATION:  Baker's cyst present on LLE w/ patient grimacing when pressed upon, 100% reproducing patients pain. The common femoral, superficial femoral and popliteal veins were identified and differentiated from the corresponding arteries.  Compression of the vein demonstrated no DVT and no thrombus was visualized in the veins.  IMAGE DOCUMENTATION: Images were archived to PACs system.           Critical Care time:               No results found for this or any previous visit (from the past 24 hour(s)).    Medications - No data to display    Assessments & Plan (with Medical Decision Making)     I have reviewed the nursing notes.    I have reviewed the findings, diagnosis, plan and need for follow up with the patient.  52-year-old female here with pain to left knee with intermittent radiation above and below.  Obvious Nath's cyst fine on bedside ultrasound.  Given absence of hypoxia chest pain,  no indication to transfer patient for formal left lower extremity ultrasound.  I have placed her with orthopedic surgery referral so they could either drain it or ultimately remove it.  No DVT seen on my ultrasound.    Discharge Medication List as of 12/25/2022 12:17 AM          Final diagnoses:   Synovial cyst of left popliteal space       12/24/2022   [facility]     Antwan Sharma MD  12/26/22 2003

## 2022-12-31 ENCOUNTER — APPOINTMENT (OUTPATIENT)
Dept: GENERAL RADIOLOGY | Facility: HOSPITAL | Age: 52
End: 2022-12-31
Attending: EMERGENCY MEDICINE
Payer: COMMERCIAL

## 2022-12-31 ENCOUNTER — HOSPITAL ENCOUNTER (EMERGENCY)
Facility: HOSPITAL | Age: 52
Discharge: HOME | End: 2022-12-31
Attending: EMERGENCY MEDICINE | Admitting: EMERGENCY MEDICINE
Payer: COMMERCIAL

## 2022-12-31 VITALS
TEMPERATURE: 97.8 F | OXYGEN SATURATION: 98 % | HEART RATE: 72 BPM | SYSTOLIC BLOOD PRESSURE: 142 MMHG | DIASTOLIC BLOOD PRESSURE: 66 MMHG | RESPIRATION RATE: 20 BRPM

## 2022-12-31 DIAGNOSIS — H10.31 ACUTE BACTERIAL CONJUNCTIVITIS OF RIGHT EYE: ICD-10-CM

## 2022-12-31 DIAGNOSIS — J45.901 MILD ASTHMA WITH EXACERBATION, UNSPECIFIED WHETHER PERSISTENT: Primary | ICD-10-CM

## 2022-12-31 LAB
FLUAV RNA SPEC QL NAA+PROBE: NEGATIVE
FLUBV RNA RESP QL NAA+PROBE: NEGATIVE
RSV RNA SPEC NAA+PROBE: POSITIVE
SARS-COV-2 RNA RESP QL NAA+PROBE: NEGATIVE

## 2022-12-31 PROCEDURE — 71045 X-RAY EXAM CHEST 1 VIEW: CPT

## 2022-12-31 PROCEDURE — C9803 HOPD COVID-19 SPEC COLLECT: HCPCS

## 2022-12-31 PROCEDURE — 87637 SARSCOV2&INF A&B&RSV AMP PRB: CPT | Performed by: EMERGENCY MEDICINE

## 2022-12-31 PROCEDURE — 99284 EMERGENCY DEPT VISIT MOD MDM: CPT | Mod: CS | Performed by: EMERGENCY MEDICINE

## 2022-12-31 PROCEDURE — 250N000013 HC RX MED GY IP 250 OP 250 PS 637: Performed by: PHYSICIAN ASSISTANT

## 2022-12-31 PROCEDURE — 250N000009 HC RX 250: Performed by: EMERGENCY MEDICINE

## 2022-12-31 PROCEDURE — 94640 AIRWAY INHALATION TREATMENT: CPT

## 2022-12-31 PROCEDURE — 99284 EMERGENCY DEPT VISIT MOD MDM: CPT | Mod: 25,CS

## 2022-12-31 RX ORDER — PREDNISONE 10 MG/1
10 TABLET ORAL 2 TIMES DAILY
Qty: 30 TABLET | Refills: 0 | Status: SHIPPED | OUTPATIENT
Start: 2022-12-31 | End: 2023-01-05

## 2022-12-31 RX ORDER — IBUPROFEN 200 MG
400 TABLET ORAL ONCE
Status: COMPLETED | OUTPATIENT
Start: 2022-12-31 | End: 2022-12-31

## 2022-12-31 RX ORDER — IPRATROPIUM BROMIDE AND ALBUTEROL SULFATE 2.5; .5 MG/3ML; MG/3ML
1 SOLUTION RESPIRATORY (INHALATION) EVERY 6 HOURS PRN
Qty: 75 ML | Refills: 0 | Status: SHIPPED | OUTPATIENT
Start: 2022-12-31 | End: 2023-03-21

## 2022-12-31 RX ORDER — IPRATROPIUM BROMIDE AND ALBUTEROL SULFATE 2.5; .5 MG/3ML; MG/3ML
3 SOLUTION RESPIRATORY (INHALATION) ONCE
Status: COMPLETED | OUTPATIENT
Start: 2022-12-31 | End: 2022-12-31

## 2022-12-31 RX ADMIN — IPRATROPIUM BROMIDE AND ALBUTEROL SULFATE 3 ML: 2.5; .5 SOLUTION RESPIRATORY (INHALATION) at 17:32

## 2022-12-31 RX ADMIN — IBUPROFEN 400 MG: 200 TABLET, FILM COATED ORAL at 18:37

## 2022-12-31 ASSESSMENT — ENCOUNTER SYMPTOMS
CARDIOVASCULAR NEGATIVE: 1
COUGH: 1
SHORTNESS OF BREATH: 1
MUSCULOSKELETAL NEGATIVE: 1
CONSTITUTIONAL NEGATIVE: 1
EYE REDNESS: 1
EYE DISCHARGE: 1
NEUROLOGICAL NEGATIVE: 1
CHEST TIGHTNESS: 1
WHEEZING: 1
GASTROINTESTINAL NEGATIVE: 1
ENDOCRINE NEGATIVE: 1

## 2022-12-31 ASSESSMENT — ACTIVITIES OF DAILY LIVING (ADL): ADLS_ACUITY_SCORE: 35

## 2022-12-31 NOTE — ED TRIAGE NOTES
"\"For 1-2 days having right eye pain, redness and drainage.  Also for 1-2 days having a dry cough and wheezing that comes and goes.  Having shortness of breath at times.  I think it is my asthma.\"       "

## 2022-12-31 NOTE — ED PROVIDER NOTES
"  History     Chief Complaint   Patient presents with     Asthma     Eye Problem     Cough     Shortness of Breath     HPI  Liza Goins is a 52 year old female who to the emergency department complaining of 2 days of eye redness and drainage.  Patient has polymyxin drops that she has been using.  They  next year.  She states she was given those when she had a \"stye\".  She also states that she had a dry cough and wheezing for the last 2 days as well.  She states she was exposed to someone who was ill a few days ago.  She does have asthma.  She states however she is out of her inhalers.  She requests a nebulizer treatment.  She denies pain in her chest.  She states he is not having pain in her chest.  She denies any abdominal pain.  She has had no nausea or vomiting.  She has had no change in her bowel or bladder habits.    Allergies:  Allergies   Allergen Reactions     Latex Itching, Hives and Rash       Problem List:    Patient Active Problem List    Diagnosis Date Noted     History of colitis 09/10/2020     Priority: Medium     Added automatically from request for surgery [id number]       ADHD, predominantly inattentive type 2020     Priority: Medium     Formatting of this note might be different from the original.  Diagnosed 2020       Depression 2019     Priority: Medium     Chronic neck and back pain 2019     Priority: Medium     Malignant neoplasm of left breast (H) 2019     Priority: Medium     Formatting of this note might be different from the original.  Followed by oncology       Abnormal uterine bleeding 11/10/2018     Priority: Medium     Mild intermittent asthma 10/02/2017     Priority: Medium     Family history of uterine cancer 2015     Priority: Medium     Formatting of this note might be different from the original.  Mother, Stalin Goins,  42  MR#[id number] Uterine Cancer age 49 & Brother with colon cancer  Formatting of this note might be different from the " original.  Overview:   Mother, Stalin Goins,  8-21-42  MR#[id number] Uterine Cancer age 49 & Brother with colon cancer       Asthma 2013     Priority: Medium        Past Medical History:    No past medical history on file.    Past Surgical History:    Past Surgical History:   Procedure Laterality Date     COLONOSCOPY N/A 2020    Procedure: diagnostic colonoscopy with biopsy;  Surgeon: Bong Olson MD;  Location: [facility]     MASTECTOMY, BILATERAL Bilateral 2018       Family History:    No family history on file.    Social History:  Marital Status:  Single [1]  Social History     Tobacco Use     Smoking status: Former     Smokeless tobacco: Never   Vaping Use     Vaping Use: Never used   Substance Use Topics     Alcohol use: Not Currently     Comment: rare     Drug use: Not Currently        Medications:    ipratropium - albuterol 0.5 mg/2.5 mg/3 mL (DUONEB) 0.5-2.5 (3) MG/3ML neb solution  predniSONE (DELTASONE) 10 MG tablet  albuterol (PROAIR HFA) 108 (90 Base) MCG/ACT inhaler  albuterol (PROAIR HFA/PROVENTIL HFA/VENTOLIN HFA) 108 (90 Base) MCG/ACT inhaler  alum & mag hydroxide-simethicone (MAALOX MAX) 400-400-40 MG/5ML SUSP suspension  benzoyl peroxide 2.5 % GEL  cetirizine (ZYRTEC) 10 MG tablet  cholecalciferol (VITAMIN D3) 25 mcg (1000 units) capsule  clindamycin (CLEOCIN T) 1 % external lotion  famotidine (PEPCID) 20 MG tablet  ketoconazole (NIZORAL) 2 % external shampoo  loratadine (CLARITIN) 10 MG tablet  meloxicam (MOBIC) 15 MG tablet  methocarbamol (ROBAXIN) 750 MG tablet  mometasone (ELOCON) 0.1 % external cream  ondansetron (ZOFRAN-ODT) 4 MG ODT tab  spacer (OPTICHAMBER DON) holding chamber  trimethoprim-polymyxin b (POLYTRIM) 23393-9.1 UNIT/ML-% ophthalmic solution          Review of Systems   Constitutional: Negative.    HENT: Negative.    Eyes: Positive for discharge and redness.   Respiratory: Positive for cough, chest tightness, shortness of breath and wheezing.    Cardiovascular:  Negative.    Gastrointestinal: Negative.    Endocrine: Negative.    Genitourinary: Negative.    Musculoskeletal: Negative.    Neurological: Negative.    All other systems reviewed they are found unremarkable    Physical Exam   BP: 148/88  Pulse: 87  Temp: 100  F (37.8  C)  Resp: 16  SpO2: 95 %      Physical Exam 52-year-old female who is awake alert oriented person place and time.  Very pleasant and cooperative with my exam.  HEENT normocephalic extraocular muscles intact pupils equally round and neck to light.  Right conjunctiva is injected.  There is a slight amount of purulent drainage from the medial canthus.  Tongue midline palate intact oropharynx is clear.  Neck is supple his range of motion without pain.  Pulmonary exam patient has slightly diminished breath sounds bilaterally.  She has mild end expiratory wheeze.  Heart maintains regular rate and rhythm.  S1 and S2 sounds appreciated.  Abdomen is soft and nontender no mass no organomegaly no rebound.  Extremities a full range of motion 5/5 strength.  Brisk peripheral pulses brisk capillary refill no sensory deficit.  Neurologic exam no focal cranial nerve deficit.  Dermatologic exam no diffuse skin rashes or lesions noted.    ED Course              ED Course as of 12/31/22 1819   Sat Dec 31, 2022   1818 Patient remained very stable throughout her stay in the department and felt improved after nebulizer treatment.  I discussed lab and x-ray findings with the patient.  We will discharge her with a nebulizer and I will prescribe albuterol solution.  Also start a course of prednisone.  I will advise the patient to continue using her polymyxin eyedrops.  I will also advise her to be reassessed by her primary care provider as soon as possible this coming week.                         Results for orders placed or performed during the hospital encounter of 12/31/22 (from the past 24 hour(s))   Symptomatic Influenza A/B & SARS-CoV2 (COVID-19) Virus PCR Multiplex  Nasopharyngeal    Specimen: Nasopharyngeal; Swab   Result Value Ref Range    Influenza A PCR Negative Negative    Influenza B PCR Negative Negative    RSV PCR Positive (A) Negative    SARS CoV2 PCR Negative Negative    Narrative    Testing was performed using the Xpert Xpress CoV2/Flu/RSV Assay on the [employer] GeneXpert Instrument. This test should be ordered for the detection of SARS-CoV-2 and influenza viruses in individuals who meet clinical and/or epidemiological criteria. Test performance is unknown in asymptomatic patients. This test is for in vitro diagnostic use under the FDA EUA for laboratories certified under CLIA to perform high or moderate complexity testing. This test has not been FDA cleared or approved. A negative result does not rule out the presence of PCR inhibitors in the specimen or target RNA in concentration below the limit of detection for the assay. If only one viral target is positive but coinfection with multiple targets is suspected, the sample should be re-tested with another FDA cleared, approved, or authorized test, if coinfection would change clinical management. This test was validated by the [facility] and [facility]. These laboratories are certified under the Clinical Laboratory Improvement Amendments of 1988 (CLIA-88) as qualified to perform high complexity laboratory testing.   XR Chest Port 1 View    Narrative    PROCEDURE:  XR CHEST PORT 1 VIEW    HISTORY:  dyspnea/cough.     COMPARISON:  1/18/2022    FINDINGS:   The cardiac silhouette is normal in size. The pulmonary vasculature is  normal.  The lungs are clear. No pleural effusion or pneumothorax.      Impression    IMPRESSION:  No acute cardiopulmonary disease.      OBINNA GUTIERREZ MD         SYSTEM ID:  [id number]       Medications   ipratropium - albuterol 0.5 mg/2.5 mg/3 mL (DUONEB) neb solution 3 mL (3 mLs Nebulization Given 12/31/22 1732)       Assessments & Plan (with Medical Decision Making)     I have reviewed the  nursing notes.    I have reviewed the findings, diagnosis, plan and need for follow up with the patient.                  New Prescriptions    IPRATROPIUM - ALBUTEROL 0.5 MG/2.5 MG/3 ML (DUONEB) 0.5-2.5 (3) MG/3ML NEB SOLUTION    Take 1 vial (3 mLs) by nebulization every 6 hours as needed for shortness of breath, wheezing or cough    PREDNISONE (DELTASONE) 10 MG TABLET    Take 1 tablet (10 mg) by mouth 2 times daily for 5 days       Final diagnoses:   Mild asthma with exacerbation, unspecified whether persistent   Acute bacterial conjunctivitis of right eye       12/31/2022   [facility]     Devin Dominguez,   12/31/22 3988

## 2022-12-31 NOTE — ED NOTES
Needs refill on in haler and worried she might be getting a sty in right eye wants to know if she can use medicine that she was given last time. Able to speak in full sentences

## 2023-03-09 ENCOUNTER — HOSPITAL ENCOUNTER (EMERGENCY)
Facility: HOSPITAL | Age: 53
Discharge: HOME | End: 2023-03-09
Attending: NURSE PRACTITIONER | Admitting: NURSE PRACTITIONER
Payer: COMMERCIAL

## 2023-03-09 ENCOUNTER — APPOINTMENT (OUTPATIENT)
Dept: GENERAL RADIOLOGY | Facility: HOSPITAL | Age: 53
End: 2023-03-09
Attending: NURSE PRACTITIONER
Payer: COMMERCIAL

## 2023-03-09 VITALS
HEART RATE: 74 BPM | DIASTOLIC BLOOD PRESSURE: 102 MMHG | OXYGEN SATURATION: 96 % | TEMPERATURE: 97.8 F | SYSTOLIC BLOOD PRESSURE: 172 MMHG | RESPIRATION RATE: 20 BRPM

## 2023-03-09 DIAGNOSIS — R03.0 ELEVATED BLOOD PRESSURE READING: ICD-10-CM

## 2023-03-09 DIAGNOSIS — M54.50 LOWER BACK PAIN: Primary | ICD-10-CM

## 2023-03-09 LAB
ALBUMIN UR-MCNC: NEGATIVE MG/DL
APPEARANCE UR: CLEAR
BACTERIA #/AREA URNS HPF: ABNORMAL /HPF
BILIRUB UR QL STRIP: NEGATIVE
COLOR UR AUTO: ABNORMAL
GLUCOSE UR STRIP-MCNC: NEGATIVE MG/DL
HGB UR QL STRIP: NEGATIVE
KETONES UR STRIP-MCNC: NEGATIVE MG/DL
LEUKOCYTE ESTERASE UR QL STRIP: NEGATIVE
MUCOUS THREADS #/AREA URNS LPF: PRESENT /LPF
NITRATE UR QL: NEGATIVE
PH UR STRIP: 6.5 [PH] (ref 4.7–8)
RBC URINE: 1 /HPF
SP GR UR STRIP: 1.02 (ref 1–1.03)
SQUAMOUS EPITHELIAL: 0 /HPF
UROBILINOGEN UR STRIP-MCNC: NORMAL MG/DL
WBC URINE: 1 /HPF

## 2023-03-09 PROCEDURE — G0463 HOSPITAL OUTPT CLINIC VISIT: HCPCS

## 2023-03-09 PROCEDURE — 72100 X-RAY EXAM L-S SPINE 2/3 VWS: CPT

## 2023-03-09 PROCEDURE — 99213 OFFICE O/P EST LOW 20 MIN: CPT | Performed by: NURSE PRACTITIONER

## 2023-03-09 PROCEDURE — 81001 URINALYSIS AUTO W/SCOPE: CPT | Performed by: NURSE PRACTITIONER

## 2023-03-09 RX ORDER — CYCLOBENZAPRINE HCL 10 MG
10 TABLET ORAL 2 TIMES DAILY PRN
Qty: 10 TABLET | Refills: 0 | Status: SHIPPED | OUTPATIENT
Start: 2023-03-09 | End: 2023-03-21

## 2023-03-09 RX ORDER — PREDNISONE 20 MG/1
TABLET ORAL
Qty: 10 TABLET | Refills: 0 | Status: SHIPPED | OUTPATIENT
Start: 2023-03-09 | End: 2023-03-24

## 2023-03-09 ASSESSMENT — ENCOUNTER SYMPTOMS
MYALGIAS: 1
ABDOMINAL PAIN: 0
FREQUENCY: 0
CHILLS: 0
FEVER: 0
DYSURIA: 0
FLANK PAIN: 0
NECK PAIN: 0
HEMATURIA: 0
DIZZINESS: 0
LIGHT-HEADEDNESS: 0
DIARRHEA: 0
PHOTOPHOBIA: 0
PSYCHIATRIC NEGATIVE: 1
NAUSEA: 0
HEADACHES: 0
NECK STIFFNESS: 0
SHORTNESS OF BREATH: 0
VOMITING: 0
WEAKNESS: 0

## 2023-03-09 ASSESSMENT — ACTIVITIES OF DAILY LIVING (ADL): ADLS_ACUITY_SCORE: 33

## 2023-03-09 NOTE — ED PROVIDER NOTES
History     Chief Complaint   Patient presents with     Flank Pain     HPI  Liza Goins is a 53 year old female who presents to urgent care today ambulatory with complaints of right lower back pain which started on 2023.  Denies any history of UTI.  Denies any history of kidney stones.  Denies any dysuria, frequency or hematuria.  Denies any genital sores or risk of STDs.  Denies any vaginal bleeding, discharge or pain.  History of back pain.  Denies any fall, injury or trauma.  Denies any bowel or bladder dysfunction.  Denies any saddle anesthesia.  Denies any numbness or weakness of bilateral lower extremities.  Denies any fever, chills, nausea, vomiting, diarrhea, shortness of breath or chest pain.  Denies any neck pain or stiffness.  Denies any abdominal pain.  Has not taken any over-the-counter pain medication.  No other concerns.    Allergies:  Allergies   Allergen Reactions     Latex Itching, Hives and Rash       Problem List:    Patient Active Problem List    Diagnosis Date Noted     History of colitis 09/10/2020     Priority: Medium     Added automatically from request for surgery [id number]       ADHD, predominantly inattentive type 2020     Priority: Medium     Formatting of this note might be different from the original.  Diagnosed 2020       Depression 2019     Priority: Medium     Chronic neck and back pain 2019     Priority: Medium     Malignant neoplasm of left breast (H) 2019     Priority: Medium     Formatting of this note might be different from the original.  Followed by oncology       Abnormal uterine bleeding 11/10/2018     Priority: Medium     Mild intermittent asthma 10/02/2017     Priority: Medium     Family history of uterine cancer 2015     Priority: Medium     Formatting of this note might be different from the original.  Mother, Stalin Goins,  8-21-42  MR#[id number] Uterine Cancer age 49 & Brother with colon cancer  Formatting of this note might be  different from the original.  Overview:   Mother, Stalin Goins,  8-21-42  MR#[id number] Uterine Cancer age 49 & Brother with colon cancer       Asthma 2013     Priority: Medium        Past Medical History:    No past medical history on file.    Past Surgical History:    Past Surgical History:   Procedure Laterality Date     COLONOSCOPY N/A 2020    Procedure: diagnostic colonoscopy with biopsy;  Surgeon: Bong Olson MD;  Location: [facility]     MASTECTOMY, BILATERAL Bilateral 2018       Family History:    No family history on file.    Social History:  Marital Status:  Single [1]  Social History     Tobacco Use     Smoking status: Former     Smokeless tobacco: Never   Vaping Use     Vaping Use: Never used   Substance Use Topics     Alcohol use: Not Currently     Comment: rare     Drug use: Not Currently        Medications:    cyclobenzaprine (FLEXERIL) 10 MG tablet  predniSONE (DELTASONE) 20 MG tablet  albuterol (PROAIR HFA) 108 (90 Base) MCG/ACT inhaler  albuterol (PROAIR HFA/PROVENTIL HFA/VENTOLIN HFA) 108 (90 Base) MCG/ACT inhaler  alum & mag hydroxide-simethicone (MAALOX MAX) 400-400-40 MG/5ML SUSP suspension  benzoyl peroxide 2.5 % GEL  cetirizine (ZYRTEC) 10 MG tablet  cholecalciferol (VITAMIN D3) 25 mcg (1000 units) capsule  clindamycin (CLEOCIN T) 1 % external lotion  famotidine (PEPCID) 20 MG tablet  ipratropium - albuterol 0.5 mg/2.5 mg/3 mL (DUONEB) 0.5-2.5 (3) MG/3ML neb solution  ketoconazole (NIZORAL) 2 % external shampoo  loratadine (CLARITIN) 10 MG tablet  meloxicam (MOBIC) 15 MG tablet  methocarbamol (ROBAXIN) 750 MG tablet  mometasone (ELOCON) 0.1 % external cream  ondansetron (ZOFRAN-ODT) 4 MG ODT tab  spacer (OPTICHAMBER DON) holding chamber  trimethoprim-polymyxin b (POLYTRIM) 91918-0.1 UNIT/ML-% ophthalmic solution      Review of Systems   Constitutional: Negative for chills and fever.   HENT: Negative for tinnitus.    Eyes: Negative for photophobia and visual disturbance.    Respiratory: Negative for shortness of breath.    Cardiovascular: Negative for chest pain.   Gastrointestinal: Negative for abdominal pain, diarrhea, nausea and vomiting.   Genitourinary: Negative for decreased urine volume, dysuria, flank pain, frequency, genital sores, hematuria, pelvic pain, vaginal bleeding, vaginal discharge and vaginal pain.   Musculoskeletal: Positive for myalgias. Negative for gait problem, neck pain and neck stiffness.   Skin: Negative.    Neurological: Negative for dizziness, syncope, weakness, light-headedness and headaches.   Psychiatric/Behavioral: Negative.      Physical Exam   BP: (!) 178/122  Pulse: 74  Temp: 97.8  F (36.6  C)  Resp: 20  SpO2: 96 %    Physical Exam  Vitals and nursing note reviewed.   Constitutional:       General: She is not in acute distress.     Appearance: Normal appearance. She is not ill-appearing or toxic-appearing.   Cardiovascular:      Rate and Rhythm: Normal rate and regular rhythm.      Pulses: Normal pulses.      Heart sounds: Normal heart sounds.   Pulmonary:      Effort: Pulmonary effort is normal.      Breath sounds: Normal breath sounds.   Abdominal:      General: Bowel sounds are normal.      Palpations: Abdomen is soft.      Tenderness: There is no abdominal tenderness. There is no right CVA tenderness or left CVA tenderness.   Musculoskeletal:      Cervical back: Normal.      Thoracic back: Normal.      Lumbar back: Tenderness present. No swelling, edema, deformity, signs of trauma, lacerations, spasms or bony tenderness. Normal range of motion. No scoliosis.   Skin:     General: Skin is warm and dry.      Capillary Refill: Capillary refill takes less than 2 seconds.   Neurological:      Mental Status: She is alert.   Psychiatric:         Mood and Affect: Mood normal.       ED Course     Results for orders placed or performed during the hospital encounter of 03/09/23 (from the past 24 hour(s))   UA with Microscopic reflex to Culture    Specimen:  Urine, Midstream   Result Value Ref Range    Color Urine Light Yellow Colorless, Straw, Light Yellow, Yellow    Appearance Urine Clear Clear    Glucose Urine Negative Negative mg/dL    Bilirubin Urine Negative Negative    Ketones Urine Negative Negative mg/dL    Specific Gravity Urine 1.016 1.003 - 1.035    Blood Urine Negative Negative    pH Urine 6.5 4.7 - 8.0    Protein Albumin Urine Negative Negative mg/dL    Urobilinogen Urine Normal Normal, 2.0 mg/dL    Nitrite Urine Negative Negative    Leukocyte Esterase Urine Negative Negative    Bacteria Urine Few (A) None Seen /HPF    Mucus Urine Present (A) None Seen /LPF    RBC Urine 1 <=2 /HPF    WBC Urine 1 <=5 /HPF    Squamous Epithelials Urine 0 <=1 /HPF    Narrative    Urine Culture not indicated   Lumbar spine XR, 2-3 views    Narrative    EXAM: XR LUMBAR SPINE 2/3 VIEWS, 3/9/2023 5:11 PM     HISTORY: right lower back pain    TECHNIQUE: 3 images of the lumbar spine were obtained.    COMPARISON: CT abdomen and pelvis 11/17/2021    FINDINGS:    5 lumbar type vertebral bodies are identified. Normal variant  hypoplastic T12 ribs. There is no acute osseous abnormality. The  vertebral body alignment is maintained.    Mild disc space loss at L5-S1. Lower lumbar facet hypertrophy that is  most prominent at L5-S1. The visualized sacroiliac joints demonstrate  mild degenerative changes, right greater than left. Nonobstructive  bowel gas pattern.      Impression    IMPRESSION:  No acute abnormality. Lumbar spondylosis that is most prominent at  L5-S1.    GREER CORREA MD         SYSTEM ID:  [id number]       Medications - No data to display    Assessments & Plan (with Medical Decision Making)     I have reviewed the nursing notes.    I have reviewed the findings, diagnosis, plan and need for follow up with the patient.  (M54.50) Lower back pain  (primary encounter diagnosis)  (R03.0) Elevated blood pressure reading  Plan:   Patient ambulatory with a nontoxic appearance.  Patient  able to stand from a seated position and ambulate without difficulty.  Elevated blood pressure reading today, patient nervous while in urgent care.  Denies any headache, dizziness, blurred vision, neck pain or stiffness, chest pain or shortness of breath.  He denies any fever, chills, nausea, vomiting, diarrhea.  Denies any bowel or bladder dysfunction.  Denies any saddle anesthesia.  Denies any numbness or weakness of bilateral lower extremities not attributed to pain.  Full ROM of back.  No spinal tenderness.  UA negative for urinary tract infection.  Denies any dysuria, frequency or hematuria.  No history of kidney stones.  History of back pain, x-ray of lumbar spine completed which shows no acute abnormality.  Lumbar spondylosis that is most prominent at L5-S1.  Denies any fall, injury or trauma.  We will treat back pain with short course of prednisone for pain and inflammation and cyclobenzaprine as needed for muscle spasms.  Patient states she no longer takes meloxicam or methocarbamol.  Patient to rest.  Alternate ice and heat.  Follow-up with primary care provider or return to urgent care/ED with any worsening in condition or additional concerns.  Patient is in agreement with treatment plan.    Discharge Medication List as of 3/9/2023  5:38 PM      START taking these medications    Details   cyclobenzaprine (FLEXERIL) 10 MG tablet Take 1 tablet (10 mg) by mouth 2 times daily as needed for muscle spasms, Disp-10 tablet, R-0, E-Prescribe      predniSONE (DELTASONE) 20 MG tablet Take two tablets (= 40mg) each day for 5 (five) days, Disp-10 tablet, R-0, E-Prescribe           Final diagnoses:   Lower back pain   Elevated blood pressure reading     3/9/2023   [facility]     Nuvia Nevarez NP  03/09/23 [id number]

## 2023-03-09 NOTE — ED TRIAGE NOTES
Pt presents with c/o waistline back pain to the right. Denies hx of UTI's or kidney stones. Endorsees hx of back pain. Pain started on the 27th. No otc meds.      Triage Assessment     Row Name 03/09/23 3450       Triage Assessment (Adult)    Airway WDL WDL       Respiratory WDL    Respiratory WDL WDL       Skin Circulation/Temperature WDL    Skin Circulation/Temperature WDL WDL       Cardiac WDL    Cardiac WDL WDL       Peripheral/Neurovascular WDL    Peripheral Neurovascular WDL WDL       Cognitive/Neuro/Behavioral WDL    Cognitive/Neuro/Behavioral WDL WDL

## 2023-03-09 NOTE — ED TRIAGE NOTES
Developed right flank pain on 27 th [place] go. Pain intermit in nature and last only a brief time. Currently no pain . Then states its uncomfortable to sit. Denies urinary symptoms     Triage Assessment     Row Name 03/09/23 1537       Triage Assessment (Adult)    Airway WDL WDL       Respiratory WDL    Respiratory WDL WDL       Skin Circulation/Temperature WDL    Skin Circulation/Temperature WDL WDL       Cardiac WDL    Cardiac WDL WDL       Peripheral/Neurovascular WDL    Peripheral Neurovascular WDL WDL       Cognitive/Neuro/Behavioral WDL    Cognitive/Neuro/Behavioral WDL WDL

## 2023-03-09 NOTE — DISCHARGE INSTRUCTIONS
Call and establish care for follow-up regarding elevated blood pressure reading.    Prednisone as ordered for pain    Cyclobenzaprine as needed for muscle spasms    Rest    Alternate ice and heat as needed for pain    Follow-up with primary care provider or return to urgent care/ED with any worsening in condition or additional concerns

## 2023-03-14 ENCOUNTER — ALLIED HEALTH/NURSE VISIT (OUTPATIENT)
Dept: FAMILY MEDICINE | Facility: OTHER | Age: 53
End: 2023-03-14
Payer: COMMERCIAL

## 2023-03-14 ENCOUNTER — HOSPITAL ENCOUNTER (EMERGENCY)
Facility: HOSPITAL | Age: 53
Discharge: HOME | End: 2023-03-15
Attending: EMERGENCY MEDICINE | Admitting: EMERGENCY MEDICINE
Payer: COMMERCIAL

## 2023-03-14 DIAGNOSIS — Z11.1 TUBERCULIN SKIN TEST ENCOUNTER: Primary | ICD-10-CM

## 2023-03-14 DIAGNOSIS — R07.9 CHEST PAIN, UNSPECIFIED TYPE: ICD-10-CM

## 2023-03-14 PROCEDURE — 86580 TB INTRADERMAL TEST: CPT

## 2023-03-14 PROCEDURE — 99284 EMERGENCY DEPT VISIT MOD MDM: CPT

## 2023-03-14 PROCEDURE — 99284 EMERGENCY DEPT VISIT MOD MDM: CPT | Performed by: EMERGENCY MEDICINE

## 2023-03-14 PROCEDURE — 93005 ELECTROCARDIOGRAM TRACING: CPT

## 2023-03-14 NOTE — PROGRESS NOTES
Patient is here today for a Mantoux (TST) test placement.    Is there a current order in the chart? Yes    Reason for Mantoux (TST) in patient's own words: for a job    Patient needs form signed? No - form not needed per patient.    Instructed patient to wait for 15 minutes post injection and to report any reactions immediately to staff.    Told patient to return to clinic in 48-72 hours to have Mantoux (TST) read. Apt scheduled.

## 2023-03-15 VITALS
SYSTOLIC BLOOD PRESSURE: 149 MMHG | DIASTOLIC BLOOD PRESSURE: 75 MMHG | RESPIRATION RATE: 20 BRPM | TEMPERATURE: 97.7 F | OXYGEN SATURATION: 99 % | HEART RATE: 78 BPM

## 2023-03-15 LAB
ANION GAP SERPL CALCULATED.3IONS-SCNC: 10 MMOL/L (ref 7–15)
BASOPHILS # BLD AUTO: 0.1 10E3/UL (ref 0–0.2)
BASOPHILS NFR BLD AUTO: 1 %
BUN SERPL-MCNC: 20.1 MG/DL (ref 6–20)
CALCIUM SERPL-MCNC: 8.9 MG/DL (ref 8.6–10)
CHLORIDE SERPL-SCNC: 103 MMOL/L (ref 98–107)
CREAT SERPL-MCNC: 0.88 MG/DL (ref 0.51–0.95)
DEPRECATED HCO3 PLAS-SCNC: 24 MMOL/L (ref 22–29)
EOSINOPHIL # BLD AUTO: 0.3 10E3/UL (ref 0–0.7)
EOSINOPHIL NFR BLD AUTO: 3 %
ERYTHROCYTE [DISTWIDTH] IN BLOOD BY AUTOMATED COUNT: 12.6 % (ref 10–15)
GFR SERPL CREATININE-BSD FRML MDRD: 79 ML/MIN/1.73M2
GLUCOSE SERPL-MCNC: 108 MG/DL (ref 70–99)
HCT VFR BLD AUTO: 43.3 % (ref 35–47)
HGB BLD-MCNC: 14.5 G/DL (ref 11.7–15.7)
IMM GRANULOCYTES # BLD: 0 10E3/UL
IMM GRANULOCYTES NFR BLD: 0 %
LIPASE SERPL-CCNC: 38 U/L (ref 13–60)
LYMPHOCYTES # BLD AUTO: 2.9 10E3/UL (ref 0.8–5.3)
LYMPHOCYTES NFR BLD AUTO: 27 %
MCH RBC QN AUTO: 30.1 PG (ref 26.5–33)
MCHC RBC AUTO-ENTMCNC: 33.5 G/DL (ref 31.5–36.5)
MCV RBC AUTO: 90 FL (ref 78–100)
MONOCYTES # BLD AUTO: 0.8 10E3/UL (ref 0–1.3)
MONOCYTES NFR BLD AUTO: 7 %
NEUTROPHILS # BLD AUTO: 6.8 10E3/UL (ref 1.6–8.3)
NEUTROPHILS NFR BLD AUTO: 62 %
NRBC # BLD AUTO: 0 10E3/UL
NRBC BLD AUTO-RTO: 0 /100
PLATELET # BLD AUTO: 220 10E3/UL (ref 150–450)
POTASSIUM SERPL-SCNC: 3.4 MMOL/L (ref 3.4–5.3)
RBC # BLD AUTO: 4.81 10E6/UL (ref 3.8–5.2)
SODIUM SERPL-SCNC: 137 MMOL/L (ref 136–145)
TROPONIN T SERPL HS-MCNC: <6 NG/L
WBC # BLD AUTO: 10.8 10E3/UL (ref 4–11)

## 2023-03-15 PROCEDURE — 85025 COMPLETE CBC W/AUTO DIFF WBC: CPT | Performed by: EMERGENCY MEDICINE

## 2023-03-15 PROCEDURE — 83690 ASSAY OF LIPASE: CPT | Performed by: EMERGENCY MEDICINE

## 2023-03-15 PROCEDURE — 36415 COLL VENOUS BLD VENIPUNCTURE: CPT | Performed by: EMERGENCY MEDICINE

## 2023-03-15 PROCEDURE — 80048 BASIC METABOLIC PNL TOTAL CA: CPT | Performed by: EMERGENCY MEDICINE

## 2023-03-15 PROCEDURE — 84484 ASSAY OF TROPONIN QUANT: CPT | Performed by: EMERGENCY MEDICINE

## 2023-03-15 ASSESSMENT — ENCOUNTER SYMPTOMS
CHILLS: 0
FEVER: 0
COUGH: 0
SHORTNESS OF BREATH: 0

## 2023-03-15 ASSESSMENT — ACTIVITIES OF DAILY LIVING (ADL): ADLS_ACUITY_SCORE: 35

## 2023-03-15 NOTE — ED TRIAGE NOTES
Pt presents with an episode of chest pain that radiated to her back about 30 minutes ago. Pt relates that she was sitting on the floor when this happened and lasted only a few minutes. Pt currently pain free in triage.     Ramón Montalvo MSN, RN on 3/14/2023 at 11:52 PM

## 2023-03-15 NOTE — ED PROVIDER NOTES
History     Chief Complaint   Patient presents with     Chest Pain     HPI  Liza Goins is a 53 year old female who is here with report of chest pain.  Happened a few hours ago.  Sternal going to back.  Lasted for a few minutes.  Did not have diaphoresis with it nor any difficulty breathing nor any radiation anywhere else aside from her back.  No cardiac history.    Allergies:  Allergies   Allergen Reactions     Latex Itching, Hives and Rash       Problem List:    Patient Active Problem List    Diagnosis Date Noted     History of colitis 09/10/2020     Priority: Medium     Added automatically from request for surgery [id number]       ADHD, predominantly inattentive type 2020     Priority: Medium     Formatting of this note might be different from the original.  Diagnosed 2020       Depression 2019     Priority: Medium     Chronic neck and back pain 2019     Priority: Medium     Malignant neoplasm of left breast (H) 2019     Priority: Medium     Formatting of this note might be different from the original.  Followed by oncology       Abnormal uterine bleeding 11/10/2018     Priority: Medium     Mild intermittent asthma 10/02/2017     Priority: Medium     Family history of uterine cancer 2015     Priority: Medium     Formatting of this note might be different from the original.  Mother, CELE Reinoso 8-21-42  MR#[id number] Uterine Cancer age 49 & Brother with colon cancer  Formatting of this note might be different from the original.  Overview:   Mother, CELE Reinoso 8-21-42  MR#[id number] Uterine Cancer age 49 & Brother with colon cancer       Asthma 2013     Priority: Medium        Past Medical History:    No past medical history on file.    Past Surgical History:    Past Surgical History:   Procedure Laterality Date     COLONOSCOPY N/A 2020    Procedure: diagnostic colonoscopy with biopsy;  Surgeon: Bong Olson MD;  Location: [facility]     MASTECTOMY, BILATERAL  Bilateral 2018       Family History:    No family history on file.    Social History:  Marital Status:  Single [1]  Social History     Tobacco Use     Smoking status: Former     Smokeless tobacco: Never   Vaping Use     Vaping Use: Never used   Substance Use Topics     Alcohol use: Not Currently     Comment: rare     Drug use: Not Currently        Medications:    albuterol (PROAIR HFA) 108 (90 Base) MCG/ACT inhaler  albuterol (PROAIR HFA/PROVENTIL HFA/VENTOLIN HFA) 108 (90 Base) MCG/ACT inhaler  alum & mag hydroxide-simethicone (MAALOX MAX) 400-400-40 MG/5ML SUSP suspension  benzoyl peroxide 2.5 % GEL  cetirizine (ZYRTEC) 10 MG tablet  cholecalciferol (VITAMIN D3) 25 mcg (1000 units) capsule  clindamycin (CLEOCIN T) 1 % external lotion  cyclobenzaprine (FLEXERIL) 10 MG tablet  famotidine (PEPCID) 20 MG tablet  ipratropium - albuterol 0.5 mg/2.5 mg/3 mL (DUONEB) 0.5-2.5 (3) MG/3ML neb solution  ketoconazole (NIZORAL) 2 % external shampoo  loratadine (CLARITIN) 10 MG tablet  meloxicam (MOBIC) 15 MG tablet  methocarbamol (ROBAXIN) 750 MG tablet  mometasone (ELOCON) 0.1 % external cream  ondansetron (ZOFRAN-ODT) 4 MG ODT tab  predniSONE (DELTASONE) 20 MG tablet  spacer (OPTICHAMBER DON) holding chamber  trimethoprim-polymyxin b (POLYTRIM) 78547-4.1 UNIT/ML-% ophthalmic solution          Review of Systems   Constitutional: Negative for chills and fever.   Respiratory: Negative for cough and shortness of breath.    All other systems reviewed and are negative.      Physical Exam   BP: 161/91  Pulse: 74  Temp: 97.7  F (36.5  C)  Resp: 20  SpO2: 96 %      Physical Exam  Constitutional:       General: She is not in acute distress.     Appearance: She is not diaphoretic.   HENT:      Head: Normocephalic and atraumatic.      Right Ear: External ear normal.      Left Ear: External ear normal.      Nose: No congestion or rhinorrhea.      Mouth/Throat:      Pharynx: Oropharynx is clear. No oropharyngeal exudate.   Eyes:       General: No scleral icterus.     Pupils: Pupils are equal, round, and reactive to light.   Cardiovascular:      Rate and Rhythm: Normal rate and regular rhythm.      Heart sounds: Normal heart sounds.   Pulmonary:      Effort: No respiratory distress.      Breath sounds: Normal breath sounds.   Abdominal:      General: Bowel sounds are normal.      Palpations: Abdomen is soft.      Tenderness: There is no abdominal tenderness.   Musculoskeletal:         General: No tenderness.      Cervical back: Normal range of motion and neck supple.      Right lower leg: No edema.      Left lower leg: No edema.   Skin:     General: Skin is warm.      Capillary Refill: Capillary refill takes less than 2 seconds.      Findings: No rash.   Neurological:      Mental Status: Mental status is at baseline.      Cranial Nerves: No cranial nerve deficit.   Psychiatric:         Mood and Affect: Mood normal.         Behavior: Behavior normal.         ED Course                 Procedures              EKG Interpretation:      Interpreted by Antwan Sharma MD  Time reviewed:   Symptoms at time of EKG: chest pain   Rhythm: normal sinus   Rate: normal  Axis: normal  Ectopy: none  Conduction: normal  ST Segments/ T Waves: No ST-T wave changes  Q Waves: none  Comparison to prior:     Clinical Impression: normal EKG          Critical Care time:               Results for orders placed or performed during the hospital encounter of 03/14/23 (from the past 24 hour(s))   CBC with platelets differential    Narrative    The following orders were created for panel order CBC with platelets differential.  Procedure                               Abnormality         Status                     ---------                               -----------         ------                     CBC with platelets and d...[[id number]]                      Final result                 Please view results for these tests on the individual orders.   Basic metabolic panel   Result  Value Ref Range    Sodium 137 136 - 145 mmol/L    Potassium 3.4 3.4 - 5.3 mmol/L    Chloride 103 98 - 107 mmol/L    Carbon Dioxide (CO2) 24 22 - 29 mmol/L    Anion Gap 10 7 - 15 mmol/L    Urea Nitrogen 20.1 (H) 6.0 - 20.0 mg/dL    Creatinine 0.88 0.51 - 0.95 mg/dL    Calcium 8.9 8.6 - 10.0 mg/dL    Glucose 108 (H) 70 - 99 mg/dL    GFR Estimate 79 >60 mL/min/1.73m2   Troponin T, High Sensitivity   Result Value Ref Range    Troponin T, High Sensitivity <6 <=14 ng/L   Lipase   Result Value Ref Range    Lipase 38 13 - 60 U/L   CBC with platelets and differential   Result Value Ref Range    WBC Count 10.8 4.0 - 11.0 10e3/uL    RBC Count 4.81 3.80 - 5.20 10e6/uL    Hemoglobin 14.5 11.7 - 15.7 g/dL    Hematocrit 43.3 35.0 - 47.0 %    MCV 90 78 - 100 fL    MCH 30.1 26.5 - 33.0 pg    MCHC 33.5 31.5 - 36.5 g/dL    RDW 12.6 10.0 - 15.0 %    Platelet Count 220 150 - 450 10e3/uL    % Neutrophils 62 %    % Lymphocytes 27 %    % Monocytes 7 %    % Eosinophils 3 %    % Basophils 1 %    % Immature Granulocytes 0 %    NRBCs per 100 WBC 0 <1 /100    Absolute Neutrophils 6.8 1.6 - 8.3 10e3/uL    Absolute Lymphocytes 2.9 0.8 - 5.3 10e3/uL    Absolute Monocytes 0.8 0.0 - 1.3 10e3/uL    Absolute Eosinophils 0.3 0.0 - 0.7 10e3/uL    Absolute Basophils 0.1 0.0 - 0.2 10e3/uL    Absolute Immature Granulocytes 0.0 <=0.4 10e3/uL    Absolute NRBCs 0.0 10e3/uL       Medications - No data to display    Assessments & Plan (with Medical Decision Making)     I have reviewed the nursing notes.    I have reviewed the findings, diagnosis, plan and need for follow up with the patient.          Medical Decision Making  The patient's presentation was of low complexity (an acute and uncomplicated illness or injury).    The patient's evaluation involved:  ordering and/or review of 3+ test(s) in this encounter (see separate area of note for details)    The patient's management necessitated only low risk treatment.    53-year-old female here with chest pain.   High-sensitivity troponin unremarkable.  EKG without ischemia.  Stable for discharge home with primary follow-up.    Discharge Medication List as of 3/15/2023  1:08 AM          Final diagnoses:   Chest pain, unspecified type       3/14/2023   [facility]     Antwan Sharma MD  03/15/23 0509

## 2023-03-16 ENCOUNTER — ALLIED HEALTH/NURSE VISIT (OUTPATIENT)
Dept: FAMILY MEDICINE | Facility: OTHER | Age: 53
End: 2023-03-16
Attending: STUDENT IN AN ORGANIZED HEALTH CARE EDUCATION/TRAINING PROGRAM
Payer: COMMERCIAL

## 2023-03-16 DIAGNOSIS — Z11.1 TUBERCULIN SKIN TEST ENCOUNTER: Primary | ICD-10-CM

## 2023-03-16 LAB
PPDINDURATION: 0 MM (ref 0–4.99)
PPDREDNESS: NORMAL

## 2023-03-16 PROCEDURE — 86580 TB INTRADERMAL TEST: CPT

## 2023-03-16 NOTE — PROGRESS NOTES
Patient is here today for a Mantoux (TST) test results.    Did patient return to clinic 48-72 hours from Mantoux (TST) placement:   Yes -     PPD Induration   Date Value Ref Range Status   03/16/2023 0 0 - 4.99 mm Final     PPD Redness   Date Value Ref Range Status   03/16/2023 Not Present  Final     Induration Size? Induration <5mm - Enter results in Enter/Edit Activity. Route results to ordering provider.     Patient needs form signed? No    Patient reports having previously had the BCG Vaccine: No    Does patient need a two step?  Yes - placed order according to standing order or notified LP for need for next TST.  Instructed patient when to return to the clinic.

## 2023-03-20 NOTE — PROGRESS NOTES
Assessment & Plan     Encounter to establish care  Accepted    Class 3 severe obesity with body mass index (BMI) of 45.0 to 49.9 in adult, unspecified obesity type, unspecified whether serious comorbidity present (H)  - Comprehensive Weight Management Referral      Benign essential hypertension  At goal. Refill.   - lisinopril (ZESTRIL) 10 MG tablet; Take 1 tablet (10 mg) by mouth daily    Gastroesophageal reflux disease without esophagitis  Noted. Refill.   - famotidine (PEPCID) 20 MG tablet; Take 1 tablet (20 mg) by mouth 2 times daily as needed (REFLUX)    Itching of ear  Noted. Refill.   - mometasone (ELOCON) 0.1 % external cream; Apply a thin film of cream to the affected ear canal (s) twice a day for 2 weeks, then use sparingly as needed only.    Mild intermittent asthma, unspecified whether complicated  Noted. Stable refill.   - albuterol (PROAIR HFA) 108 (90 Base) MCG/ACT inhaler; Inhale 2 puffs into the lungs every 4 hours as needed for shortness of breath  - ipratropium - albuterol 0.5 mg/2.5 mg/3 mL (DUONEB) 0.5-2.5 (3) MG/3ML neb solution; Take 1 vial (3 mLs) by nebulization every 6 hours as needed for shortness of breath, wheezing or cough    Acne vulgaris  Noted. Refill.   - clindamycin (CLEOCIN T) 1 % external lotion; Apply topically daily To affected areas of face    Nausea  Noted. Refill. Uses rarely  - ondansetron (ZOFRAN ODT) 4 MG ODT tab; Take 1 tablet (4 mg) by mouth every 8 hours as needed for nausea    Seasonal allergic rhinitis, unspecified trigger  Noted. Refill   - loratadine (CLARITIN) 10 MG tablet; Take 1 tablet (10 mg) by mouth daily    Vitamin D deficiency  Noted. Refill.   - vitamin D3 (CHOLECALCIFEROL) 50 mcg (2000 units) tablet; Take 1 tablet (50 mcg) by mouth daily    Muscle spasm  Noted. refill  - cyclobenzaprine (FLEXERIL) 10 MG tablet; Take 1 tablet (10 mg) by mouth 2 times daily as needed for muscle spasms    Psoriasis  Noted. refill  - ketoconazole (NIZORAL) 2 % external  "shampoo; Apply topically daily as needed for itching or irritation :[id number]}     BMI:   Estimated body mass index is 46.24 kg/m  as calculated from the following:    Height as of this encounter: 1.657 m (5' 5.25\").    Weight as of this encounter: 127 kg (280 lb).   Weight management plan: Patient referred to endocrine and/or weight management specialty        No follow-ups on file.    Yanet Britton, [facility] RAQUEL De Jesus is a 53 year old, presenting for the following health issues:  Establish Care      HPI     Establish Care  Would like to discuss weight loss.   Has tried exercising, decreasing calorie intake, keto, V-Shred (alternating KETO and non-KETO). Interested in more formal structure.   BMI 46.24    Wt Readings from Last 4 Encounters:   03/24/23 127 kg (280 lb)   03/21/23 127 kg (280 lb)   02/07/22 129.3 kg (285 lb)   12/20/21 131.5 kg (290 lb)       Recently in ED for right thoracic back pain 3/9/23 - improved.    ED cardiac work up on 3/14. Notes reviewed. Resolved.     Histories, problem list, medications, and allergies reviewed with Liza and updated as indicated. Liza is requesting refills today. See Assessment and Plan above.           Review of Systems   Constitutional, HEENT, cardiovascular, pulmonary, gi and gu systems are negative, except as otherwise noted.      Objective    BP (!) 150/92   Pulse 66   Temp 97.3  F (36.3  C) (Tympanic)   Resp 12   Ht 1.657 m (5' 5.25\")   Wt 127 kg (280 lb)   SpO2 97%   BMI 46.24 kg/m    Body mass index is 46.24 kg/m .     Physical Exam   GENERAL: alert, no distress and obese  EYES: Eyes grossly normal to inspection, PERRL and conjunctivae and sclerae normal  HENT: ear canals and TM's normal, nose and mouth without ulcers or lesions  NECK: no adenopathy, no asymmetry, masses, or scars and thyroid normal to palpation  RESP: lungs clear to auscultation - no rales, rhonchi or wheezes  CV: regular rate and rhythm, " normal S1 S2, no S3 or S4, no murmur, click or rub, no peripheral edema and peripheral pulses strong  ABDOMEN: soft, nontender, no hepatosplenomegaly, no masses and bowel sounds normal  NEURO: Normal strength and tone, mentation intact and speech normal  BACK: no CVA tenderness, no paralumbar tenderness      No results found for any visits on 03/21/23.

## 2023-03-21 ENCOUNTER — OFFICE VISIT (OUTPATIENT)
Dept: FAMILY MEDICINE | Facility: OTHER | Age: 53
End: 2023-03-21
Attending: NURSE PRACTITIONER
Payer: COMMERCIAL

## 2023-03-21 VITALS
DIASTOLIC BLOOD PRESSURE: 92 MMHG | TEMPERATURE: 97.3 F | SYSTOLIC BLOOD PRESSURE: 150 MMHG | RESPIRATION RATE: 12 BRPM | HEART RATE: 66 BPM | WEIGHT: 280 LBS | BODY MASS INDEX: 46.65 KG/M2 | HEIGHT: 65 IN | OXYGEN SATURATION: 97 %

## 2023-03-21 DIAGNOSIS — L40.9 PSORIASIS: ICD-10-CM

## 2023-03-21 DIAGNOSIS — E55.9 VITAMIN D DEFICIENCY: ICD-10-CM

## 2023-03-21 DIAGNOSIS — R11.0 NAUSEA: ICD-10-CM

## 2023-03-21 DIAGNOSIS — K21.9 GASTROESOPHAGEAL REFLUX DISEASE WITHOUT ESOPHAGITIS: ICD-10-CM

## 2023-03-21 DIAGNOSIS — L70.0 ACNE VULGARIS: ICD-10-CM

## 2023-03-21 DIAGNOSIS — J45.20 MILD INTERMITTENT ASTHMA, UNSPECIFIED WHETHER COMPLICATED: ICD-10-CM

## 2023-03-21 DIAGNOSIS — M62.838 MUSCLE SPASM: ICD-10-CM

## 2023-03-21 DIAGNOSIS — I10 BENIGN ESSENTIAL HYPERTENSION: ICD-10-CM

## 2023-03-21 DIAGNOSIS — J30.2 SEASONAL ALLERGIC RHINITIS, UNSPECIFIED TRIGGER: ICD-10-CM

## 2023-03-21 DIAGNOSIS — Z76.89 ENCOUNTER TO ESTABLISH CARE: Primary | ICD-10-CM

## 2023-03-21 DIAGNOSIS — L29.9 ITCHING OF EAR: ICD-10-CM

## 2023-03-21 DIAGNOSIS — E66.01 CLASS 3 SEVERE OBESITY WITH BODY MASS INDEX (BMI) OF 45.0 TO 49.9 IN ADULT, UNSPECIFIED OBESITY TYPE, UNSPECIFIED WHETHER SERIOUS COMORBIDITY PRESENT (H): ICD-10-CM

## 2023-03-21 DIAGNOSIS — E66.813 CLASS 3 SEVERE OBESITY WITH BODY MASS INDEX (BMI) OF 45.0 TO 49.9 IN ADULT, UNSPECIFIED OBESITY TYPE, UNSPECIFIED WHETHER SERIOUS COMORBIDITY PRESENT (H): ICD-10-CM

## 2023-03-21 PROBLEM — F32.1 MAJOR DEPRESSIVE DISORDER, SINGLE EPISODE, MODERATE (H): Status: ACTIVE | Noted: 2017-06-28

## 2023-03-21 PROBLEM — N93.9 ABNORMAL VAGINAL BLEEDING: Status: ACTIVE | Noted: 2018-11-10

## 2023-03-21 PROBLEM — C50.419 MALIGNANT NEOPLASM OF UPPER-OUTER QUADRANT OF FEMALE BREAST (H): Status: ACTIVE | Noted: 2018-11-21

## 2023-03-21 PROBLEM — G47.33 OBSTRUCTIVE SLEEP APNEA SYNDROME: Status: ACTIVE | Noted: 2018-11-21

## 2023-03-21 PROBLEM — R73.01 IMPAIRED FASTING GLUCOSE: Status: ACTIVE | Noted: 2017-01-09

## 2023-03-21 PROCEDURE — 99214 OFFICE O/P EST MOD 30 MIN: CPT | Performed by: NURSE PRACTITIONER

## 2023-03-21 PROCEDURE — G0463 HOSPITAL OUTPT CLINIC VISIT: HCPCS

## 2023-03-21 RX ORDER — CYCLOBENZAPRINE HCL 10 MG
10 TABLET ORAL 2 TIMES DAILY PRN
Qty: 10 TABLET | Refills: 0 | Status: SHIPPED | OUTPATIENT
Start: 2023-03-21

## 2023-03-21 RX ORDER — ALBUTEROL SULFATE 90 UG/1
2 AEROSOL, METERED RESPIRATORY (INHALATION) EVERY 4 HOURS PRN
Qty: 8.5 G | Refills: 0 | Status: SHIPPED | OUTPATIENT
Start: 2023-03-21

## 2023-03-21 RX ORDER — MOMETASONE FUROATE 1 MG/G
CREAM TOPICAL
Qty: 45 G | Refills: 3 | Status: SHIPPED | OUTPATIENT
Start: 2023-03-21

## 2023-03-21 RX ORDER — KETOCONAZOLE 20 MG/ML
SHAMPOO TOPICAL DAILY PRN
Qty: 100 ML | Refills: 0 | Status: SHIPPED | OUTPATIENT
Start: 2023-03-21

## 2023-03-21 RX ORDER — IPRATROPIUM BROMIDE AND ALBUTEROL SULFATE 2.5; .5 MG/3ML; MG/3ML
1 SOLUTION RESPIRATORY (INHALATION) EVERY 6 HOURS PRN
Qty: 75 ML | Refills: 1 | Status: SHIPPED | OUTPATIENT
Start: 2023-03-21

## 2023-03-21 RX ORDER — ONDANSETRON 4 MG/1
4 TABLET, ORALLY DISINTEGRATING ORAL EVERY 8 HOURS PRN
Qty: 15 TABLET | Refills: 0 | Status: SHIPPED | OUTPATIENT
Start: 2023-03-21

## 2023-03-21 RX ORDER — CHOLECALCIFEROL (VITAMIN D3) 50 MCG
1 TABLET ORAL DAILY
Qty: 90 TABLET | Refills: 3 | Status: SHIPPED | OUTPATIENT
Start: 2023-03-21

## 2023-03-21 RX ORDER — LORATADINE 10 MG/1
10 TABLET ORAL DAILY
Qty: 90 TABLET | Refills: 2 | Status: SHIPPED | OUTPATIENT
Start: 2023-03-21

## 2023-03-21 RX ORDER — FAMOTIDINE 20 MG/1
20 TABLET, FILM COATED ORAL 2 TIMES DAILY PRN
Qty: 180 TABLET | Refills: 1 | Status: SHIPPED | OUTPATIENT
Start: 2023-03-21

## 2023-03-21 RX ORDER — CLINDAMYCIN PHOSPHATE 10 UG/ML
LOTION TOPICAL DAILY
Qty: 60 ML | Refills: 1 | Status: SHIPPED | OUTPATIENT
Start: 2023-03-21

## 2023-03-21 RX ORDER — LISINOPRIL 10 MG/1
10 TABLET ORAL DAILY
Qty: 90 TABLET | Refills: 0 | Status: SHIPPED | OUTPATIENT
Start: 2023-03-21

## 2023-03-21 RX ORDER — BENZOYL PEROXIDE 2.5 G/100G
GEL TOPICAL DAILY PRN
Qty: 60 G | Refills: 0 | Status: SHIPPED | OUTPATIENT
Start: 2023-03-21 | End: 2023-03-23

## 2023-03-21 ASSESSMENT — ASTHMA QUESTIONNAIRES: ACT_TOTALSCORE: 21

## 2023-03-21 ASSESSMENT — ANXIETY QUESTIONNAIRES
7. FEELING AFRAID AS IF SOMETHING AWFUL MIGHT HAPPEN: MORE THAN HALF THE DAYS
GAD7 TOTAL SCORE: 12
2. NOT BEING ABLE TO STOP OR CONTROL WORRYING: MORE THAN HALF THE DAYS
5. BEING SO RESTLESS THAT IT IS HARD TO SIT STILL: SEVERAL DAYS
GAD7 TOTAL SCORE: 12
IF YOU CHECKED OFF ANY PROBLEMS ON THIS QUESTIONNAIRE, HOW DIFFICULT HAVE THESE PROBLEMS MADE IT FOR YOU TO DO YOUR WORK, TAKE CARE OF THINGS AT HOME, OR GET ALONG WITH OTHER PEOPLE: EXTREMELY DIFFICULT
1. FEELING NERVOUS, ANXIOUS, OR ON EDGE: MORE THAN HALF THE DAYS
3. WORRYING TOO MUCH ABOUT DIFFERENT THINGS: MORE THAN HALF THE DAYS
6. BECOMING EASILY ANNOYED OR IRRITABLE: MORE THAN HALF THE DAYS

## 2023-03-21 ASSESSMENT — PAIN SCALES - GENERAL: PAINLEVEL: MILD PAIN (3)

## 2023-03-21 ASSESSMENT — PATIENT HEALTH QUESTIONNAIRE - PHQ9: 5. POOR APPETITE OR OVEREATING: SEVERAL DAYS

## 2023-03-21 NOTE — LETTER
My Asthma Action Plan    Name: Liza Goins   YOB: 1970  Date: 3/21/2023   My doctor: Yanet Britton CNP   My clinic: [facility]        My Rescue Medicine:   Albuterol inhaler (Proair/Ventolin/Proventil HFA)  2-4 puffs EVERY 4 HOURS as needed. Use a spacer if recommended by your provider.   My Asthma Severity:   Intermittent / Exercise Induced  Know your asthma triggers: Patient is unaware of triggers              GREEN ZONE   Good Control    I feel good    No cough or wheeze    Can work, sleep and play without asthma symptoms       Take your asthma control medicine every day.     1. If exercise triggers your asthma, take your rescue medication    15 minutes before exercise or sports, and    During exercise if you have asthma symptoms  2. Spacer to use with inhaler: If you have a spacer, make sure to use it with your inhaler             YELLOW ZONE Getting Worse  I have ANY of these:    I do not feel good    Cough or wheeze    Chest feels tight    Wake up at night   1. Keep taking your Green Zone medications  2. Start taking your rescue medicine:    every 20 minutes for up to 1 hour. Then every 4 hours for 24-48 hours.  3. If you stay in the Yellow Zone for more than 12-24 hours, contact your doctor.  4. If you do not return to the Green Zone in 12-24 hours or you get worse, start taking your oral steroid medicine if prescribed by your provider.           RED ZONE Medical Alert - Get Help  I have ANY of these:    I feel awful    Medicine is not helping    Breathing getting harder    Trouble walking or talking    Nose opens wide to breathe       1. Take your rescue medicine NOW  2. If your provider has prescribed an oral steroid medicine, start taking it NOW  3. Call your doctor NOW  4. If you are still in the Red Zone after 20 minutes and you have not reached your doctor:    Take your rescue medicine again and    Call 911 or go to the emergency room right away    See your  regular doctor within 2 weeks of an Emergency Room or Urgent Care visit for follow-up treatment.          Annual Reminders:  Meet with Asthma Educator,  Flu Shot in the Fall, consider Pneumonia Vaccination for patients with asthma (aged 19 and older).    Pharmacy:    [id number] - [place], [place] - [address] [facility] [address]  [id number] - [place], [place] - [address] [facility] - [place], [place] - [address]    Electronically signed by Yanet Britton CNP   Date: 03/21/23                    Asthma Triggers  How To Control Things That Make Your Asthma Worse    Triggers are things that make your asthma worse.  Look at the list below to help you find your triggers and   what you can do about them. You can help prevent asthma flare-ups by staying away from your triggers.      Trigger                                                          What you can do   Cigarette Smoke  Tobacco smoke can make asthma worse. Do not allow smoking in your home, car or around you.  Be sure no one smokes at a child s day care or school.  If you smoke, ask your health care provider for ways to help you quit.  Ask family members to quit too.  Ask your health care provider for a referral to Quit Plan to help you quit smoking, or call 1-769-894-PLAN.     Colds, Flu, Bronchitis  These are common triggers of asthma. Wash your hands often.  Don t touch your eyes, nose or mouth.  Get a flu shot every year.     Dust Mites  These are tiny bugs that live in cloth or carpet. They are too small to see. Wash sheets and blankets in hot water every week.   Encase pillows and mattress in dust mite proof covers.  Avoid having carpet if you can. If you have carpet, vacuum weekly.   Use a dust mask and HEPA vacuum.   Pollen and Outdoor Mold  Some people are allergic to trees, grass, or weed pollen, or molds. Try to keep your windows closed.  Limit time out  doors when pollen count is high.   Ask you health care provider about taking medicine during allergy season.     Animal Dander  Some people are allergic to skin flakes, urine or saliva from pets with fur or feathers. Keep pets with fur or feathers out of your home.    If you can t keep the pet outdoors, then keep the pet out of your bedroom.  Keep the bedroom door closed.  Keep pets off cloth furniture and away from stuffed toys.     Mice, Rats, and Cockroaches  Some people are allergic to the waste from these pests.   Cover food and garbage.  Clean up spills and food crumbs.  Store grease in the refrigerator.   Keep food out of the bedroom.   Indoor Mold  This can be a trigger if your home has high moisture. Fix leaking faucets, pipes, or other sources of water.   Clean moldy surfaces.  Dehumidify basement if it is damp and smelly.   Smoke, Strong Odors, and Sprays  These can reduce air quality. Stay away from strong odors and sprays, such as perfume, powder, hair spray, paints, smoke incense, paint, cleaning products, candles and new carpet.   Exercise or Sports  Some people with asthma have this trigger. Be active!  Ask your doctor about taking medicine before sports or exercise to prevent symptoms.    Warm up for 5-10 minutes before and after sports or exercise.     Other Triggers of Asthma  Cold air:  Cover your nose and mouth with a scarf.  Sometimes laughing or crying can be a trigger.  Some medicines and food can trigger asthma.

## 2023-03-23 ENCOUNTER — TELEPHONE (OUTPATIENT)
Dept: FAMILY MEDICINE | Facility: OTHER | Age: 53
End: 2023-03-23

## 2023-03-23 DIAGNOSIS — L70.0 ACNE VULGARIS: ICD-10-CM

## 2023-03-23 RX ORDER — BENZOYL PEROXIDE 2.5 G/100G
GEL TOPICAL DAILY PRN
Qty: 60 G | Refills: 0 | Status: SHIPPED | OUTPATIENT
Start: 2023-03-23 | End: 2023-06-21

## 2023-03-23 NOTE — TELEPHONE ENCOUNTER
Duoneb is unavailable at this time.  Pharmacy asking for prescriptions for the separate components.    Also - benzoyl gel must state what part of body gel is applied to and estimated day supply.

## 2023-03-23 NOTE — TELEPHONE ENCOUNTER
March 23, 2023    PCP out of office 4/21/23. Left message for patient to return call to reschedule.    -Karen WORLEY  [facility] Coordinator

## 2023-03-23 NOTE — PROGRESS NOTES
Assessment & Plan     Acute bilateral low back pain without sciatica  Start prednisone today  After done with prednisone you can start the ibuprofen ordered. Do not use addditional NSAIDS (ibuprofen, naproxen, etc)  Start PT.     Reviewed back care tips. See handouts.     - predniSONE (DELTASONE) 20 MG tablet; Take 2 tablets (40 mg) by mouth daily for 5 days  - Physical Therapy Referral; Future  - ibuprofen (ADVIL/MOTRIN) 800 MG tablet; Take 1 tablet (800 mg) by mouth every 8 hours as needed for moderate pain (4-6) Start when done with prednisone    Morbid obesity (H)  - Lipid Profile (Chol, Trig, HDL, LDL calc); Future  - Hemoglobin A1c; Future  - Lipid Profile (Chol, Trig, HDL, LDL calc)  - Hemoglobin A1c    Prescription drug management       Return if symptoms worsen or fail to improve.    Yanet Britton, [facility]ARRA[facility] De Jesus is a 53 year old, presenting for the following health issues:  Pain  No flowsheet data found.  HPI     Pain History:  When did you first notice your pain? 2/27/23 started suddenly with sharp pain and in waves. Tried to go to urgent care in [facility] but they were not open. ED in [place] 3/9/23. Xray was normal and was treated with prednisone and muscle relaxors. Was not able to pick these up due the pharmacy not having the order. Did not return for them. I saw her on 3/21/23 and reordered flexeril. Has not had back history prior to 2/27.  Have you seen anyone else for your pain? No  How has your pain affected your ability to work? Yes, left work early due to pain  Where in your body do you have pain? Back Pain  Onset/Duration: 2/27/23  Description:   Location of pain: low back left  Character of pain: sharp  Pain radiation: none  New numbness or weakness in legs, not attributed to pain: no   Intensity: Currently 4/10, moderate  Progression of Symptoms: worsening0  History:   Specific cause: none  Pain interferes with job: YES - left after 3  hours at work.   History of back problems: yes  Any previous MRI or X-rays: None  Sees a specialist for back pain: No  Alleviating factors:   Improved by: none    Precipitating factors:  Worsened by: Sitting  Therapies tried and outcome: muscle relaxants - too strong and fell asleep; ibuprofen 4 200 mg OTC ibuprofen once- did not help much.     Potential UTI symptoms reviewed and pt denies all of them.     Accompanying Signs & Symptoms:  Risk of Fracture: None  Risk of Cauda Equina: None  Risk of Infection: None  Risk of Cancer: None  Risk of Ankylosing Spondylitis: Onset at age <35, male, AND morning back stiffness  no     Lumbar XR from 3/9/23 ED visit (see chart for full report):    Impression     IMPRESSION:  No acute abnormality. Lumbar spondylosis that is most prominent at  L5-S1.     GREER CORREA MD          Review of Systems   Constitutional, HEENT, cardiovascular, pulmonary, gi and gu systems are negative, except as otherwise noted.      Objective    /74 (BP Location: Right arm, Patient Position: Sitting, Cuff Size: Adult Large)   Pulse 64   Temp 97.5  F (36.4  C) (Tympanic)   Resp 12   Wt 127 kg (280 lb)   SpO2 98%   BMI 46.24 kg/m    Body mass index is 46.24 kg/m .       Physical Exam   GENERAL: healthy, alert and no distress  MS: spine exam shows: no bony point tenderness or step-off. Lumbar paraspinal tenderness. Positive straight leg raise test bilaterally.     Results for orders placed or performed in visit on 03/24/23   Lipid Profile (Chol, Trig, HDL, LDL calc)     Status: Abnormal   Result Value Ref Range    Cholesterol 207 (H) <200 mg/dL    Triglycerides 65 <150 mg/dL    Direct Measure HDL 49 (L) >=50 mg/dL    LDL Cholesterol Calculated 145 (H) <=100 mg/dL    Non HDL Cholesterol 158 (H) <130 mg/dL    Narrative    Cholesterol  Desirable:  <200 mg/dL    Triglycerides  Normal:  Less than 150 mg/dL  Borderline High:  150-199 mg/dL  High:  200-499 mg/dL  Very High:  Greater than or equal to  500 mg/dL    Direct Measure HDL  Female:  Greater than or equal to 50 mg/dL   Male:  Greater than or equal to 40 mg/dL    LDL Cholesterol  Desirable:  <100mg/dL  Above Desirable:  100-129 mg/dL   Borderline High:  130-159 mg/dL   High:  160-189 mg/dL   Very High:  >= 190 mg/dL    Non HDL Cholesterol  Desirable:  130 mg/dL  Above Desirable:  130-159 mg/dL  Borderline High:  160-189 mg/dL  High:  190-219 mg/dL  Very High:  Greater than or equal to 220 mg/dL   Hemoglobin A1c     Status: None   Result Value Ref Range    Estimated Average Glucose 114 mg/dL    Hemoglobin A1C 5.6 <5.7 %

## 2023-03-24 ENCOUNTER — OFFICE VISIT (OUTPATIENT)
Dept: FAMILY MEDICINE | Facility: OTHER | Age: 53
End: 2023-03-24
Attending: NURSE PRACTITIONER
Payer: COMMERCIAL

## 2023-03-24 VITALS
SYSTOLIC BLOOD PRESSURE: 126 MMHG | BODY MASS INDEX: 46.24 KG/M2 | HEART RATE: 64 BPM | WEIGHT: 280 LBS | RESPIRATION RATE: 12 BRPM | TEMPERATURE: 97.5 F | OXYGEN SATURATION: 98 % | DIASTOLIC BLOOD PRESSURE: 74 MMHG

## 2023-03-24 DIAGNOSIS — E66.01 MORBID OBESITY (H): ICD-10-CM

## 2023-03-24 DIAGNOSIS — M54.50 ACUTE BILATERAL LOW BACK PAIN WITHOUT SCIATICA: Primary | ICD-10-CM

## 2023-03-24 LAB
CHOLEST SERPL-MCNC: 207 MG/DL
EST. AVERAGE GLUCOSE BLD GHB EST-MCNC: 114 MG/DL
HBA1C MFR BLD: 5.6 %
HDLC SERPL-MCNC: 49 MG/DL
LDLC SERPL CALC-MCNC: 145 MG/DL
NONHDLC SERPL-MCNC: 158 MG/DL
TRIGL SERPL-MCNC: 65 MG/DL

## 2023-03-24 PROCEDURE — 80061 LIPID PANEL: CPT | Mod: ZL | Performed by: NURSE PRACTITIONER

## 2023-03-24 PROCEDURE — 36415 COLL VENOUS BLD VENIPUNCTURE: CPT | Mod: ZL | Performed by: NURSE PRACTITIONER

## 2023-03-24 PROCEDURE — 83036 HEMOGLOBIN GLYCOSYLATED A1C: CPT | Mod: ZL | Performed by: NURSE PRACTITIONER

## 2023-03-24 PROCEDURE — G0463 HOSPITAL OUTPT CLINIC VISIT: HCPCS

## 2023-03-24 PROCEDURE — 99213 OFFICE O/P EST LOW 20 MIN: CPT | Performed by: NURSE PRACTITIONER

## 2023-03-24 RX ORDER — IBUPROFEN 800 MG/1
800 TABLET, FILM COATED ORAL EVERY 8 HOURS PRN
Qty: 60 TABLET | Refills: 0 | Status: SHIPPED | OUTPATIENT
Start: 2023-03-24 | End: 2024-07-01 | Stop reason: ALTCHOICE

## 2023-03-24 RX ORDER — PREDNISONE 20 MG/1
40 TABLET ORAL DAILY
Qty: 10 TABLET | Refills: 0 | Status: SHIPPED | OUTPATIENT
Start: 2023-03-24 | End: 2023-03-29

## 2023-03-24 ASSESSMENT — PATIENT HEALTH QUESTIONNAIRE - PHQ9: SUM OF ALL RESPONSES TO PHQ QUESTIONS 1-9: 10

## 2023-03-24 ASSESSMENT — PAIN SCALES - GENERAL: PAINLEVEL: MODERATE PAIN (4)

## 2023-03-24 NOTE — PATIENT INSTRUCTIONS
Start prednisone today  After done with prednisone you can start the ibuprofen ordered. Do not use addditional NSAIDS (ibuprofen, naproxen, etc)  Start PT.

## 2023-03-27 ENCOUNTER — ALLIED HEALTH/NURSE VISIT (OUTPATIENT)
Dept: FAMILY MEDICINE | Facility: OTHER | Age: 53
End: 2023-03-27
Attending: NURSE PRACTITIONER
Payer: COMMERCIAL

## 2023-03-27 VITALS — SYSTOLIC BLOOD PRESSURE: 118 MMHG | DIASTOLIC BLOOD PRESSURE: 74 MMHG

## 2023-03-27 DIAGNOSIS — Z11.1 TUBERCULIN SKIN TEST ENCOUNTER: Primary | ICD-10-CM

## 2023-03-27 PROCEDURE — 86580 TB INTRADERMAL TEST: CPT

## 2023-03-27 NOTE — PROGRESS NOTES
Patient is here today for a Mantoux (TST) test placement.    Is there a current order in the chart? Yes    Reason for Mantoux (TST) in patient's own words: for work    Patient needs form signed? No - form not needed per patient.    Instructed patient to wait for 15 minutes post injection and to report any reactions immediately to staff.    Told patient to return to clinic in 48-72 hours to have Mantoux (TST) read.       Pt also requested BP reading   BP Readings from Last 3 Encounters:   03/27/23 118/74   03/24/23 126/74   03/21/23 (!) 150/92

## 2023-03-29 ENCOUNTER — ALLIED HEALTH/NURSE VISIT (OUTPATIENT)
Dept: FAMILY MEDICINE | Facility: OTHER | Age: 53
End: 2023-03-29
Attending: NURSE PRACTITIONER
Payer: COMMERCIAL

## 2023-03-29 ENCOUNTER — HOSPITAL ENCOUNTER (OUTPATIENT)
Dept: PHYSICAL THERAPY | Facility: HOSPITAL | Age: 53
Setting detail: THERAPIES SERIES
Discharge: HOME | End: 2023-03-29
Attending: NURSE PRACTITIONER
Payer: COMMERCIAL

## 2023-03-29 DIAGNOSIS — M54.50 ACUTE BILATERAL LOW BACK PAIN WITHOUT SCIATICA: ICD-10-CM

## 2023-03-29 DIAGNOSIS — Z11.1 TUBERCULIN SKIN TEST ENCOUNTER: Primary | ICD-10-CM

## 2023-03-29 LAB
PPDINDURATION: 0 MM (ref 0–4.99)
PPDREDNESS: NORMAL

## 2023-03-29 PROCEDURE — 999N000104 HC STATISTIC NO CHARGE

## 2023-03-29 PROCEDURE — 97161 PT EVAL LOW COMPLEX 20 MIN: CPT | Mod: GP

## 2023-03-29 PROCEDURE — 97110 THERAPEUTIC EXERCISES: CPT | Mod: GP

## 2023-03-29 NOTE — PROGRESS NOTES
Patient is here today for a Mantoux (TST) test results.    Did patient return to clinic 48-72 hours from Mantoux (TST) placement:   Yes -     PPD Induration   Date Value Ref Range Status   03/29/2023 0 0 - 4.99 mm Final     PPD Redness   Date Value Ref Range Status   03/29/2023 Not Present  Final       Induration Size? Induration <5mm - Enter results in Enter/Edit Activity. Route results to ordering provider.     Patient needs form signed? No    Patient reports having previously had the BCG Vaccine: No    Does patient need a two step?  Yes - placed order according to standing order or notified LP for need for next TST.  Instructed patient when to return to the clinic.

## 2023-03-29 NOTE — PROGRESS NOTES
"   03/29/23 1300   General Information   Type of Visit Initial OP Ortho PT Evaluation   Start of Care Date 03/29/23   Referring Physician Yanet Britton CNP   Patient/Family Goals Statement Pt. reports she would like to decrease her pain and return to her PLOF, [place]   Orders Evaluate and Treat   Date of Order 03/24/23   Certification Required? Yes   Medical Diagnosis Acute bilateral low back pain without sciatica   Surgical/Medical history reviewed Yes   Precautions/Limitations no known precautions/limitations       Present No   Body Part(s)   Body Part(s) Lumbar Spine/SI   Presentation and Etiology   Pertinent history of current problem (include personal factors and/or comorbidities that impact the POC) Liza presents to therapy today with pain in her lumbar spine. She reports her pain first began on 2/27/23 without warning. She felt a 10/10 sharp pain that passed shortly. She denies ROBERTO including bending. twisting, or lifting. Her pain will radiate into the buttocks but she denies any strength or sensation loss bilaterally. 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 She reports that she can make it through a few hours of her shift, pharmacy or floor, before she has to leave. Pain has been somewhat relieved to date with muscle relaxers and pain killers. Pt. states her goals are to eliminate her pain and return to hr weekly walks around [facility] this summer. Pt. has no signs of cauda equina or neurologic dysfunction at today's visit. Pt. 10 minutes late following paperwork completion.   Impairments A. Pain;D. Decreased ROM;E. Decreased flexibility;F. Decreased strength and endurance   Functional Limitations perform activities of daily living;perform required work activities;perform desired leisure / sports activities   Symptom Location Pt. reports pain in the bilateral aspect of her lumbar spine that will radiate into " "her buttocks.   How/Where did it occur From insidious onset  (\"Just came on\")   Onset date of current episode/exacerbation 02/27/23   Chronicity New   Pain rating (0-10 point scale) Best (/10);Worst (/10)   Best (/10) 0   Worst (/10) 10   Pain quality A. Sharp;C. Aching;E. Shooting   Frequency of pain/symptoms B. Intermittent   Pain/symptoms are: The same all the time   Pain/symptoms exacerbated by A. Sitting;G. Certain positions;I. Bending   Pain/symptoms eased by F. Certain positions   Progression of symptoms since onset: Improved   Current / Previous Interventions   Diagnostic Tests: X-ray   X-ray Results Results   X-ray results 5 lumbar type vertebral bodies are identified. Normal variant  hypoplastic T12 ribs. There is no acute osseous abnormality. The  vertebral body alignment is maintained.     Mild disc space loss at L5-S1. Lower lumbar facet hypertrophy that is  most prominent at L5-S1. The visualized sacroiliac joints demonstrate  mild degenerative changes, right greater than left. Nonobstructive  bowel gas pattern.                                                                      IMPRESSION:  No acute abnormality. Lumbar spondylosis that is most prominent at  L5-S1.   Prior Level of Function   Prior Level of Function-Mobility Independent   Prior Level of Function-ADLs Independent   Current Level of Function   Patient role/employment history A. Employed  ([employer])   Living environment Apartment/condo   Current equipment-Gait/Locomotion None   Current equipment-ADL None   Fall Risk Screen   Fall screen completed by PT   Have you fallen 2 or more times in the past year? No   Have you fallen and had an injury in the past year? No   Timed Up and Go score (seconds) NT   Is patient a fall risk? No   Abuse Screen (yes response referral indicated)   Feels Unsafe at Home or Work/School no   Feels Threatened by Someone no   Does Anyone Try to Keep You From Having Contact with Others or Doing Things Outside Your " Home? no   Physical Signs of Abuse Present no   Presenting problem Low back pain   Patient needs abuse support services and resources No   Lumbar Spine/SI Objective Findings   Gait/Locomotion Antalgic with decreased josé manuel and velocity   Balance/Proprioception (Single Leg Stance) Slight instability bilateral   Hamstring Flexibility WNL   Hip Flexor Flexibility WNL   Quadriceps Flexibility WNL   Piriformis Flexibility WNL   Flexion ROM WNL pain at beginning of ROM and when returning from full flexion   Extension ROM Pain limited   Right Side Bending ROM Pain limited (pain on right)   Left Side Bending ROM Pain limited (pain on left)   Hip Screen WNL ROM, Pain with scour bilateral   Hip Flexion (L2) Strength 4/5 bilaterally pain on left   Hip Abduction Strength 4/5 B   Hip Extension Strength 4/5 B   Knee Extension (L3) Strength 5/5   Ankle Dorsiflexion (L4) Strength 5/5   Great Toe Extension (L5) Strength 5/5   Ankle Plantar Flexion (S1) Strength 5/5   SLR Negative   Crossover SLR Negative   Segmental Mobility Hypomobile with pain referred to buttocks lumbar CPA/UPA   Palpation Palpably tense and tender to lumbar paraspinals.   Slump Test Negative   Observation Guarded with decreased velocity/amplitude when rising from chair.   Integumentary Unremarkable   Sensation Testing intact bilateral   Planned Therapy Interventions   Planned Therapy Interventions balance training;gait training;joint mobilization;manual therapy;neuromuscular re-education;ROM;strengthening;stretching   Planned Modality Interventions   Planned Modality Interventions Cryotherapy;Electrical stimulation;Hot packs;TENS;Traction;Ultrasound   Clinical Impression   Criteria for Skilled Therapeutic Interventions Met yes, treatment indicated   PT Diagnosis Irritation of the lumbar facets and bilateral paraspinals.   Influenced by the following impairments pain, ROM, strength. activity tolerance, movement sensitivity.   Functional limitations due to  impairments Pt. reports pain and difficulty with daily and work activities.   Clinical Presentation Stable/Uncomplicated   Clinical Presentation Rationale Therapist discretion   Clinical Decision Making (Complexity) Low complexity   Therapy Frequency 2 times/Week   Predicted Duration of Therapy Intervention (days/wks) 8 weeks   Risk & Benefits of therapy have been explained Yes   Patient, Family & other staff in agreement with plan of care Yes   Clinical Impression Comments Pt. presents to therapy with Irritation of the lumbar facets and bilateral paraspinals.Skilled physical therapy is warranted to decrease pain and function along with therapeutic exercise and activity to restore strength and function.   Education Assessment   Preferred Learning Style Listening;Reading;Demonstration;Pictures/video   Barriers to Learning No barriers   ORTHO GOALS   PT Ortho Eval Goals 1;2;3;4   Ortho Goal 1   Goal Identifier LTG #1   Goal Description Pt. to be independent with correct performance of HEP to progress to independent home management of his condition.   Target Date 05/24/23   Ortho Goal 2   Goal Identifier LTG #2   Goal Description Pt. to report minimal symptom reproduction with work and daily activities to restore PLOF.   Target Date 05/24/23   Ortho Goal 3   Goal Identifier LTG #3   Goal Description Pt. to report increase in her ability to walk to > .5 mile to begin return to recreational walking.   Target Date 05/24/23   Ortho Goal 4   Goal Identifier STG #1   Goal Description Pt. to report decrease in her level of pain at worst to 5/10 or better to restore comfort with ADL's.   Target Date 04/26/23   Total Evaluation Time   PT Eval, Low Complexity Minutes (09417) 30   Therapy Certification   Certification date from 03/29/23   Certification date to 05/24/23   Medical Diagnosis Acute bilateral low back pain without sciatica   I certify the need for these services furnished under this plan of treatment and while under  my care. (Physician co-signature of this document indicates review and certification of the therapy plan).

## 2023-04-05 ENCOUNTER — HOSPITAL ENCOUNTER (OUTPATIENT)
Dept: PHYSICAL THERAPY | Facility: HOSPITAL | Age: 53
Setting detail: THERAPIES SERIES
Discharge: HOME | End: 2023-04-05
Attending: NURSE PRACTITIONER
Payer: COMMERCIAL

## 2023-04-05 PROCEDURE — 97110 THERAPEUTIC EXERCISES: CPT | Mod: GP

## 2023-04-05 PROCEDURE — 97140 MANUAL THERAPY 1/> REGIONS: CPT | Mod: GP

## 2023-04-10 ENCOUNTER — HOSPITAL ENCOUNTER (OUTPATIENT)
Dept: PHYSICAL THERAPY | Facility: HOSPITAL | Age: 53
Setting detail: THERAPIES SERIES
Discharge: HOME | End: 2023-04-10
Attending: NURSE PRACTITIONER
Payer: COMMERCIAL

## 2023-04-10 PROCEDURE — 97110 THERAPEUTIC EXERCISES: CPT | Mod: GP

## 2023-04-12 ENCOUNTER — HOSPITAL ENCOUNTER (OUTPATIENT)
Dept: PHYSICAL THERAPY | Facility: HOSPITAL | Age: 53
Setting detail: THERAPIES SERIES
Discharge: HOME | End: 2023-04-12
Attending: NURSE PRACTITIONER
Payer: COMMERCIAL

## 2023-04-12 PROCEDURE — 97110 THERAPEUTIC EXERCISES: CPT | Mod: GP

## 2023-04-20 ENCOUNTER — TELEPHONE (OUTPATIENT)
Dept: ENDOCRINOLOGY | Facility: CLINIC | Age: 53
End: 2023-04-20
Payer: COMMERCIAL

## 2023-05-12 ENCOUNTER — OFFICE VISIT (OUTPATIENT)
Dept: FAMILY MEDICINE | Facility: OTHER | Age: 53
End: 2023-05-12
Attending: NURSE PRACTITIONER
Payer: COMMERCIAL

## 2023-05-12 VITALS
RESPIRATION RATE: 18 BRPM | HEART RATE: 67 BPM | SYSTOLIC BLOOD PRESSURE: 134 MMHG | DIASTOLIC BLOOD PRESSURE: 82 MMHG | WEIGHT: 277 LBS | BODY MASS INDEX: 46.15 KG/M2 | HEIGHT: 65 IN | TEMPERATURE: 97.7 F | OXYGEN SATURATION: 96 %

## 2023-05-12 DIAGNOSIS — E66.01 MORBID OBESITY (H): ICD-10-CM

## 2023-05-12 DIAGNOSIS — R10.84 ABDOMINAL PAIN, GENERALIZED: Primary | ICD-10-CM

## 2023-05-12 DIAGNOSIS — Z11.4 SCREENING FOR HIV (HUMAN IMMUNODEFICIENCY VIRUS): ICD-10-CM

## 2023-05-12 DIAGNOSIS — Z11.59 NEED FOR HEPATITIS C SCREENING TEST: ICD-10-CM

## 2023-05-12 DIAGNOSIS — Z85.3 HX: BREAST CANCER: ICD-10-CM

## 2023-05-12 DIAGNOSIS — Z12.4 CERVICAL CANCER SCREENING: ICD-10-CM

## 2023-05-12 PROBLEM — Z17.0 ESTROGEN RECEPTOR POSITIVE STATUS (ER+): Status: ACTIVE | Noted: 2023-05-12

## 2023-05-12 LAB
ALBUMIN SERPL BCG-MCNC: 3.8 G/DL (ref 3.5–5.2)
ALBUMIN UR-MCNC: NEGATIVE MG/DL
ALP SERPL-CCNC: 93 U/L (ref 35–104)
ALT SERPL W P-5'-P-CCNC: 21 U/L (ref 10–35)
ANION GAP SERPL CALCULATED.3IONS-SCNC: 10 MMOL/L (ref 7–15)
APPEARANCE UR: CLEAR
AST SERPL W P-5'-P-CCNC: 14 U/L (ref 10–35)
BILIRUB SERPL-MCNC: 0.5 MG/DL
BILIRUB UR QL STRIP: NEGATIVE
BUN SERPL-MCNC: 18.9 MG/DL (ref 6–20)
CALCIUM SERPL-MCNC: 9.4 MG/DL (ref 8.6–10)
CHLORIDE SERPL-SCNC: 104 MMOL/L (ref 98–107)
COLOR UR AUTO: YELLOW
CREAT SERPL-MCNC: 0.82 MG/DL (ref 0.51–0.95)
CRP SERPL-MCNC: 7.12 MG/L
DEPRECATED HCO3 PLAS-SCNC: 28 MMOL/L (ref 22–29)
ERYTHROCYTE [DISTWIDTH] IN BLOOD BY AUTOMATED COUNT: 12.8 % (ref 10–15)
GFR SERPL CREATININE-BSD FRML MDRD: 86 ML/MIN/1.73M2
GLUCOSE SERPL-MCNC: 114 MG/DL (ref 70–99)
GLUCOSE UR STRIP-MCNC: NEGATIVE MG/DL
HCT VFR BLD AUTO: 45.8 % (ref 35–47)
HGB BLD-MCNC: 15.2 G/DL (ref 11.7–15.7)
HGB UR QL STRIP: NEGATIVE
KETONES UR STRIP-MCNC: NEGATIVE MG/DL
LEUKOCYTE ESTERASE UR QL STRIP: NEGATIVE
MCH RBC QN AUTO: 30.5 PG (ref 26.5–33)
MCHC RBC AUTO-ENTMCNC: 33.2 G/DL (ref 31.5–36.5)
MCV RBC AUTO: 92 FL (ref 78–100)
NITRATE UR QL: NEGATIVE
PH UR STRIP: 5.5 [PH] (ref 5–7)
PLATELET # BLD AUTO: 233 10E3/UL (ref 150–450)
POTASSIUM SERPL-SCNC: 4.4 MMOL/L (ref 3.4–5.3)
PROT SERPL-MCNC: 7 G/DL (ref 6.4–8.3)
RBC # BLD AUTO: 4.99 10E6/UL (ref 3.8–5.2)
SODIUM SERPL-SCNC: 142 MMOL/L (ref 136–145)
SP GR UR STRIP: >=1.03 (ref 1–1.03)
TSH SERPL DL<=0.005 MIU/L-ACNC: 2.28 UIU/ML (ref 0.3–4.2)
UROBILINOGEN UR STRIP-ACNC: 0.2 E.U./DL
WBC # BLD AUTO: 8.6 10E3/UL (ref 4–11)

## 2023-05-12 PROCEDURE — 84443 ASSAY THYROID STIM HORMONE: CPT | Mod: ZL | Performed by: NURSE PRACTITIONER

## 2023-05-12 PROCEDURE — 85027 COMPLETE CBC AUTOMATED: CPT | Mod: ZL | Performed by: NURSE PRACTITIONER

## 2023-05-12 PROCEDURE — G0123 SCREEN CERV/VAG THIN LAYER: HCPCS | Mod: ZL | Performed by: NURSE PRACTITIONER

## 2023-05-12 PROCEDURE — 86803 HEPATITIS C AB TEST: CPT | Mod: ZL | Performed by: NURSE PRACTITIONER

## 2023-05-12 PROCEDURE — 36415 COLL VENOUS BLD VENIPUNCTURE: CPT | Mod: ZL | Performed by: NURSE PRACTITIONER

## 2023-05-12 PROCEDURE — 99214 OFFICE O/P EST MOD 30 MIN: CPT | Performed by: NURSE PRACTITIONER

## 2023-05-12 PROCEDURE — 80053 COMPREHEN METABOLIC PANEL: CPT | Mod: ZL | Performed by: NURSE PRACTITIONER

## 2023-05-12 PROCEDURE — 87624 HPV HI-RISK TYP POOLED RSLT: CPT | Mod: ZL | Performed by: NURSE PRACTITIONER

## 2023-05-12 PROCEDURE — 87389 HIV-1 AG W/HIV-1&-2 AB AG IA: CPT | Mod: ZL | Performed by: NURSE PRACTITIONER

## 2023-05-12 PROCEDURE — 81003 URINALYSIS AUTO W/O SCOPE: CPT | Mod: ZL | Performed by: NURSE PRACTITIONER

## 2023-05-12 PROCEDURE — 86140 C-REACTIVE PROTEIN: CPT | Mod: ZL | Performed by: NURSE PRACTITIONER

## 2023-05-12 ASSESSMENT — PAIN SCALES - GENERAL: PAINLEVEL: MODERATE PAIN (4)

## 2023-05-12 NOTE — PROGRESS NOTES
{PROVIDER CHARTING PREFERENCE:255313}    Subjective   Liza is a 53 year old, presenting for the following health issues:  No chief complaint on file.         View : No data to display.              HPI     Pain History:  When did you first notice your pain? {Duration of pain :694422}  {additonal problems for provider to add (Optional):695080}      Review of Systems   {ROS COMP (Optional):488824}      Objective    There were no vitals taken for this visit.  There is no height or weight on file to calculate BMI.  Physical Exam   {Exam List (Optional):825705}    {Diagnostic Test Results (Optional):628237}    {AMBULATORY ATTESTATION (Optional):657339}

## 2023-05-12 NOTE — PROGRESS NOTES
Assessment & Plan     Abdominal pain, generalized>5   Exam reassuring for acute etiology. Unclear etiology. Ddx includes mass effect from uterine fibroid previously > 5 cm and stable, liver disease, inflammatory bowel disease. We have had a brief discussion on uterine fibroids and potential symptoms they can cause. Liza reports that it has been recommended to have a total hysterectomy with her hx of breast cancer. We will update imaging on the fibroid as well as abdomen.   - CBC with platelets; Future  - CRP, inflammation; Future  - Comprehensive metabolic panel (BMP + Alb, Alk Phos, ALT, AST, Total. Bili, TP); Future  - UA Macroscopic with reflex to Microscopic and Culture; Future  - CBC with platelets  - CRP, inflammation  - Comprehensive metabolic panel (BMP + Alb, Alk Phos, ALT, AST, Total. Bili, TP)  - UA Macroscopic with reflex to Microscopic and Culture    Morbid obesity (H)  - TSH with free T4 reflex; Future  - CBC with platelets; Future  - TSH with free T4 reflex  - CBC with platelets    Cervical cancer screening  - Pap Screen with HPV - recommended age 30 - 65 years  - HPV Hold (Lab Only)    Screening for HIV (human immunodeficiency virus)  - HIV Antigen Antibody Combo; Future  - HIV Antigen Antibody Combo    Need for hepatitis C screening test  - Hepatitis C Screen Reflex to HCV RNA Quant and Genotype; Future  - Hepatitis C Screen Reflex to HCV RNA Quant and Genotype      HX: breast cancer  Noted.       Ordering of each unique test       No follow-ups on file.    Yanet Britton, CNP  [facility] [place] [facility]    Subjective   Liza is a 53 year old, presenting for the following health issues:  Back Pain and Abdominal Pain      HPI   Offered TDAP,  pneumonia vaccine, zoster vaccine, influenza vaccine, and offer HIV & HEP C screening (blood draw). Reviewed that PAP is due. Accepting of TDAP and PAP/HPV, HIV & HEP C screening this visit.       Pain History:  Back Pain   Current plan of care:  "Current back plan of care involves ibuprofen 800 mg, cyclobenzaprine 10 mg,         When did you first notice your pain? Feb 27th  Have you seen anyone else for your pain? No - went to ER once  How has your pain affected your ability to work? 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 Abdominal/Flank Pain  Onset/Duration: march  Description:   Character: Burning and unable to describe  Location: right upper quadrant right lower quadrant  Radiation: Back  Intensity: moderate  Progression of Symptoms:  waxing and waning  Accompanying Signs & Symptoms:  Fever/Chills: No  Gas/Bloating: No  Nausea: No  Vomitting: No  Diarrhea: No  Constipation: No  Dysuria or Hematuria: No  History:   Trauma: No  Previous similar pain: No  Previous tests done: none  Precipitating factors:   Does the pain change with:     Food: No    Bowel Movement: No    Urination: No   Other factors:  No  Therapies tried and outcome: None  No LMP recorded (lmp unknown).    Hx of uterine bleeding that she went on an IUD for. Has had IUD removed around 2018.   LMP: couple months ago- full menus flow. Periods are sometimes just spotting. Not currently on birth control. Has not been sexually active for past 6 months. Reviewed risk of pregnancy if becomes sexually active again.     [facility] reviewed.   Previous imaging includes lumbar x-ray March 9, 2023 showing no acute abnormalities.  There was lumbar spondylosis at L5-S1.      08/05/2020- CT Abdomen/pelvis- masses of liver (later identified as above) large uterine fibroid     Care everywhere [facility] 8/11/2020 reviewed there was an MRI of abdomen liver with and without contrast done in August 2020.  \"Benign hepatic hemangiomas\" as well as a \"small simple cyst\" of the medial left hepatic lobe was noted.    CT abdomen and pelvis ([place]) 2021: posterior fibroid measuring 5.9 cm. \"2 small " "hemangiomas\" of liver re demonstrated. \"liver otherwise unremarkable.\"    Hx of gallbladder removal.         March 2023 labs reviewed   Normal A1C.   The 10-year ASCVD risk score (Bryson YAN, et al., 2019) is: 2.6%    Values used to calculate the score:      Age: 53 years      Sex: Female      Is Non- : No      Diabetic: No      Tobacco smoker: No      Systolic Blood Pressure: 134 mmHg      Is BP treated: Yes      HDL Cholesterol: 49 mg/dL      Total Cholesterol: 207 mg/dL      Review of Systems   Constitutional, HEENT, cardiovascular, pulmonary, gi and gu systems are negative, except as otherwise noted.      Objective    /82 (BP Location: Right arm, Patient Position: Sitting)   Pulse 67   Temp 97.7  F (36.5  C)   Resp 18   Ht 1.651 m (5' 5\")   Wt 125.6 kg (277 lb)   LMP  (LMP Unknown)   SpO2 96%   BMI 46.10 kg/m    Body mass index is 46.1 kg/m .       Physical Exam   GENERAL: healthy, alert and no distress  EYES: Eyes grossly normal to inspection, PERRL and conjunctivae and sclerae normal  RESP: lungs clear to auscultation - no rales, rhonchi or wheezes  CV: regular rate and rhythm, normal S1 S2, no S3 or S4, no murmur, click or rub, no peripheral edema and peripheral pulses strong  ABDOMEN: soft, nontender, no hepatosplenomegaly, no masses and bowel sounds normal   (female) Chaperone present. : normal female external genitalia, normal urethral meatus, vaginal mucosa, normal cervix/adnexa/uterus without masses or discharge    Labs and/or imaging are pending at the end of this clinic visit. Please see communication attached to labs and/or imaging results.                 "

## 2023-05-12 NOTE — RESULT ENCOUNTER NOTE
Labs look okay. I do not believe this was fasting and so glucose value would be normal. CRP elevation very mild and non-specific.

## 2023-05-13 LAB
HCV AB SERPL QL IA: NONREACTIVE
HIV 1+2 AB+HIV1 P24 AG SERPL QL IA: NONREACTIVE

## 2023-05-15 DIAGNOSIS — R10.31 ABDOMINAL PAIN, RIGHT LOWER QUADRANT: ICD-10-CM

## 2023-05-15 DIAGNOSIS — R10.11 ABDOMINAL PAIN, RIGHT UPPER QUADRANT: Primary | ICD-10-CM

## 2023-05-15 DIAGNOSIS — D25.9 UTERINE LEIOMYOMA, UNSPECIFIED LOCATION: ICD-10-CM

## 2023-05-15 NOTE — PROGRESS NOTES
1. Abdominal pain, right upper quadrant  - CT Abdomen Pelvis w/o & w Contrast; Future    2. Abdominal pain, right lower quadrant  - CT Abdomen Pelvis w/o & w Contrast; Future    3. Uterine leiomyoma, unspecified location  - CT Abdomen Pelvis w/o & w Contrast; Future

## 2023-05-16 NOTE — RESULT ENCOUNTER NOTE
Fasting glucose shows slight elevation. At risk for pre-diabetes. Last A1C was normal a  two  months ago. Maintain healthy weight and consider mediterranean diet, which is a recommended style of eating. Diabetes.org has great information about diabetes prevention.

## 2023-05-18 LAB
BKR LAB AP GYN ADEQUACY: NORMAL
BKR LAB AP GYN INTERPRETATION: NORMAL
BKR LAB AP HPV REFLEX: NORMAL
BKR LAB AP PREVIOUS ABNORMAL: NORMAL
PATH REPORT.COMMENTS IMP SPEC: NORMAL
PATH REPORT.COMMENTS IMP SPEC: NORMAL
PATH REPORT.RELEVANT HX SPEC: NORMAL

## 2023-05-19 LAB
HUMAN PAPILLOMA VIRUS 16 DNA: NEGATIVE
HUMAN PAPILLOMA VIRUS 18 DNA: NEGATIVE
HUMAN PAPILLOMA VIRUS FINAL DIAGNOSIS: NORMAL
HUMAN PAPILLOMA VIRUS OTHER HR: NEGATIVE

## 2023-05-22 ENCOUNTER — HOSPITAL ENCOUNTER (OUTPATIENT)
Dept: CT IMAGING | Facility: HOSPITAL | Age: 53
Discharge: HOME | End: 2023-05-22
Attending: NURSE PRACTITIONER | Admitting: NURSE PRACTITIONER
Payer: COMMERCIAL

## 2023-05-22 ENCOUNTER — TELEPHONE (OUTPATIENT)
Dept: FAMILY MEDICINE | Facility: OTHER | Age: 53
End: 2023-05-22

## 2023-05-22 DIAGNOSIS — D25.9 UTERINE LEIOMYOMA, UNSPECIFIED LOCATION: ICD-10-CM

## 2023-05-22 DIAGNOSIS — R10.31 ABDOMINAL PAIN, RIGHT LOWER QUADRANT: ICD-10-CM

## 2023-05-22 DIAGNOSIS — R10.11 ABDOMINAL PAIN, RIGHT UPPER QUADRANT: ICD-10-CM

## 2023-05-22 PROCEDURE — 74177 CT ABD & PELVIS W/CONTRAST: CPT

## 2023-05-22 PROCEDURE — 250N000011 HC RX IP 250 OP 636: Performed by: RADIOLOGY

## 2023-05-22 RX ORDER — IOPAMIDOL 755 MG/ML
17 INJECTION, SOLUTION INTRAVASCULAR ONCE
Status: COMPLETED | OUTPATIENT
Start: 2023-05-22 | End: 2023-05-22

## 2023-05-22 RX ORDER — IOPAMIDOL 755 MG/ML
125 INJECTION, SOLUTION INTRAVASCULAR ONCE
Status: COMPLETED | OUTPATIENT
Start: 2023-05-22 | End: 2023-05-22

## 2023-05-22 RX ADMIN — IOPAMIDOL 17 ML: 755 INJECTION, SOLUTION INTRAVENOUS at 13:34

## 2023-05-22 RX ADMIN — IOPAMIDOL 125 ML: 755 INJECTION, SOLUTION INTRAVENOUS at 13:35

## 2023-05-22 NOTE — TELEPHONE ENCOUNTER
Stable abdominal CT. Recommend follow up with gastroenterology should abdominal pain continue. Attempted to call without answer. Will include this information in Liza's image results.

## 2023-06-05 ENCOUNTER — VIRTUAL VISIT (OUTPATIENT)
Dept: ENDOCRINOLOGY | Facility: CLINIC | Age: 53
End: 2023-06-05
Payer: COMMERCIAL

## 2023-06-05 VITALS — BODY MASS INDEX: 46.15 KG/M2 | WEIGHT: 277 LBS | HEIGHT: 65 IN

## 2023-06-05 DIAGNOSIS — R73.01 IMPAIRED FASTING GLUCOSE: Primary | ICD-10-CM

## 2023-06-05 DIAGNOSIS — E66.01 MORBID OBESITY (H): ICD-10-CM

## 2023-06-05 PROCEDURE — 99204 OFFICE O/P NEW MOD 45 MIN: CPT | Mod: VID | Performed by: INTERNAL MEDICINE

## 2023-06-05 ASSESSMENT — PAIN SCALES - GENERAL: PAINLEVEL: MODERATE PAIN (4)
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
## 2023-06-05 NOTE — NURSING NOTE
Is the patient currently in the state of [place]? YES    Visit mode:VIDEO    If the visit is dropped, the patient can be reconnected by: VIDEO VISIT: Text to cell phone: [phone]    Will anyone else be joining the visit? NO      How would you like to obtain your AVS? MyChart    Are changes needed to the allergy or medication list? NO    Reason for visit: Consult
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
## 2023-06-06 ENCOUNTER — TELEPHONE (OUTPATIENT)
Dept: ENDOCRINOLOGY | Facility: CLINIC | Age: 53
End: 2023-06-06
Payer: COMMERCIAL

## 2023-06-06 NOTE — TELEPHONE ENCOUNTER
Pt had appt yesterday and Dr. Giraldo was going to be sending in a prescription but no prescription was sent. Patient would like sent to [facility] in [place], [place]. Please call pt once sent to pharmacy.    [phone], detailed voicemail requested.

## 2023-06-13 DIAGNOSIS — E66.01 MORBID OBESITY (H): ICD-10-CM

## 2023-06-13 DIAGNOSIS — R10.84 ABDOMINAL PAIN, GENERALIZED: Primary | ICD-10-CM

## 2023-06-13 DIAGNOSIS — D18.03 LIVER HEMANGIOMA: ICD-10-CM

## 2023-06-13 DIAGNOSIS — K21.9 GASTROESOPHAGEAL REFLUX DISEASE WITHOUT ESOPHAGITIS: ICD-10-CM

## 2023-06-13 NOTE — PROGRESS NOTES
1. Abdominal pain, generalized  [facility] referral  - Adult GI  Referral - Consult Only; Future    2. Morbid obesity (H)  - Adult GI  Referral - Consult Only; Future    3. Gastroesophageal reflux disease without esophagitis  - Adult GI  Referral - Consult Only; Future    4. Liver hemangioma  - Adult GI  Referral - Consult Only; Future

## 2023-06-15 ENCOUNTER — TRANSFERRED RECORDS (OUTPATIENT)
Dept: HEALTH INFORMATION MANAGEMENT | Facility: CLINIC | Age: 53
End: 2023-06-15

## 2023-06-27 ENCOUNTER — TELEPHONE (OUTPATIENT)
Dept: FAMILY MEDICINE | Facility: OTHER | Age: 53
End: 2023-06-27

## 2023-06-27 RX ORDER — PHENTERMINE HYDROCHLORIDE 15 MG/1
15 CAPSULE ORAL EVERY MORNING
Qty: 30 CAPSULE | Refills: 5 | Status: SHIPPED | OUTPATIENT
Start: 2023-06-27

## 2023-06-27 NOTE — TELEPHONE ENCOUNTER
1:54 PM    Reason for Call: Phone Call    Description: patient would like a rx or something for quit smoking.  She wants a call back.     Was an appointment offered for this call? No  If yes : Appointment type              Date    Preferred method for responding to this message: Telephone Call  What is your phone number ?  [phone]    If we cannot reach you directly, may we leave a detailed response at the number you provided? Yes    Can this message wait until your PCP/provider returns, if available today? YES, No provider is in today    LITZY PARKER

## 2023-06-29 NOTE — TELEPHONE ENCOUNTER
Attempted to contact this pt to offer an apt - no answer VMB full. Will re-attempt on a later date.

## 2023-07-10 ENCOUNTER — TRANSFERRED RECORDS (OUTPATIENT)
Dept: HEALTH INFORMATION MANAGEMENT | Facility: CLINIC | Age: 53
End: 2023-07-10
Payer: COMMERCIAL

## 2024-06-10 ENCOUNTER — TELEPHONE (OUTPATIENT)
Dept: FAMILY MEDICINE | Facility: OTHER | Age: 54
End: 2024-06-10

## 2024-06-10 NOTE — TELEPHONE ENCOUNTER
12:49 PM    Reason for Call: Phone Call    Description: Patient called in stating that she would like to go back on phentremine as she was off the medication for a while due to not having insurance. Pharmacy: [facility] in [place]. Please call patient back.     Was an appointment offered for this call? No  If yes : Appointment type              Date    Preferred method for responding to this message: Telephone Call  What is your phone number ? [phone]     If we cannot reach you directly, may we leave a detailed response at the number you provided? Yes    Can this message wait until your PCP/provider returns, if available today? Not applicable, PROVIDER IN CLINIC    Niya Weiss

## 2024-06-21 ENCOUNTER — TELEPHONE (OUTPATIENT)
Dept: FAMILY MEDICINE | Facility: OTHER | Age: 54
End: 2024-06-21

## 2024-06-21 NOTE — TELEPHONE ENCOUNTER
Patient returned call to discuss the upcoming appointment with Endocrinology is not until 10/2024.    Update provided patient will be in need of working with the Endocrinology Team to discuss a sooner appointment, or possibly being placed on a wait list as Yanet Britton CNP or RN CC is not able to get patient is sooner for the appointment. Explained to patient the referral process and availability of appointments. Patient verbalized understanding.

## 2024-06-21 NOTE — TELEPHONE ENCOUNTER
Call returned to patient update provided patient is to follow up with Endocrinology as they need to manage the monitoring of phentermine per Yanet Britton CNP. See note below:    Yanet Britton CNP  [facility]10 days ago     RT  Please have her MyChart the ordering provider through Endocrinology. They would need to manage the monitoring.  MARTY Markham CNP     Patient provided with Phone Number provided on Referral on 3/21/23 for Dr. Chucho Giraldo's Office/Endocrinology Department.    Pt scheduled at [facility] weight management   Tel: [phone]  Fax: [phone]    Patient verbalized understanding stating she will reach out to Endocrinology.

## 2024-06-21 NOTE — TELEPHONE ENCOUNTER
10:39 AM    Reason for Call: Phone Call    Description: Patient called in wanting to talk to the nurse she was just speaking to. Please call patient back.     Was an appointment offered for this call? No  If yes : Appointment type              Date    Preferred method for responding to this message: Telephone Call  What is your phone number ? [phone]     If we cannot reach you directly, may we leave a detailed response at the number you provided? Yes    Can this message wait until your PCP/provider returns, if available today? Not applicable    Niya Wiess

## 2024-07-01 ENCOUNTER — LAB (OUTPATIENT)
Dept: LAB | Facility: OTHER | Age: 54
End: 2024-07-01
Attending: NURSE PRACTITIONER
Payer: COMMERCIAL

## 2024-07-01 ENCOUNTER — TELEPHONE (OUTPATIENT)
Dept: FAMILY MEDICINE | Facility: OTHER | Age: 54
End: 2024-07-01

## 2024-07-01 ENCOUNTER — OFFICE VISIT (OUTPATIENT)
Dept: FAMILY MEDICINE | Facility: OTHER | Age: 54
End: 2024-07-01
Attending: NURSE PRACTITIONER
Payer: COMMERCIAL

## 2024-07-01 VITALS
BODY MASS INDEX: 46.86 KG/M2 | TEMPERATURE: 98.3 F | HEART RATE: 73 BPM | RESPIRATION RATE: 18 BRPM | OXYGEN SATURATION: 96 % | DIASTOLIC BLOOD PRESSURE: 86 MMHG | WEIGHT: 281.6 LBS | SYSTOLIC BLOOD PRESSURE: 124 MMHG

## 2024-07-01 DIAGNOSIS — G89.29 CHRONIC LOW BACK PAIN WITH SCIATICA, SCIATICA LATERALITY UNSPECIFIED, UNSPECIFIED BACK PAIN LATERALITY: ICD-10-CM

## 2024-07-01 DIAGNOSIS — M54.9 BACK PAIN, UNSPECIFIED BACK LOCATION, UNSPECIFIED BACK PAIN LATERALITY, UNSPECIFIED CHRONICITY: ICD-10-CM

## 2024-07-01 DIAGNOSIS — M54.9 ACUTE BILATERAL BACK PAIN, UNSPECIFIED BACK LOCATION: Primary | ICD-10-CM

## 2024-07-01 DIAGNOSIS — G89.29 CHRONIC SI JOINT PAIN: ICD-10-CM

## 2024-07-01 DIAGNOSIS — M54.40 CHRONIC LOW BACK PAIN WITH SCIATICA, SCIATICA LATERALITY UNSPECIFIED, UNSPECIFIED BACK PAIN LATERALITY: ICD-10-CM

## 2024-07-01 DIAGNOSIS — M53.3 CHRONIC SI JOINT PAIN: ICD-10-CM

## 2024-07-01 LAB
ALBUMIN UR-MCNC: NEGATIVE MG/DL
APPEARANCE UR: CLEAR
BILIRUB UR QL STRIP: NEGATIVE
COLOR UR AUTO: YELLOW
GLUCOSE UR STRIP-MCNC: NEGATIVE MG/DL
HGB UR QL STRIP: NEGATIVE
KETONES UR STRIP-MCNC: NEGATIVE MG/DL
LEUKOCYTE ESTERASE UR QL STRIP: NEGATIVE
NITRATE UR QL: NEGATIVE
PH UR STRIP: 6 [PH] (ref 5–7)
SP GR UR STRIP: 1.02 (ref 1–1.03)
UROBILINOGEN UR STRIP-ACNC: 0.2 E.U./DL

## 2024-07-01 PROCEDURE — G0463 HOSPITAL OUTPT CLINIC VISIT: HCPCS

## 2024-07-01 PROCEDURE — 81003 URINALYSIS AUTO W/O SCOPE: CPT | Mod: ZL

## 2024-07-01 PROCEDURE — 96372 THER/PROPH/DIAG INJ SC/IM: CPT | Performed by: NURSE PRACTITIONER

## 2024-07-01 PROCEDURE — G0463 HOSPITAL OUTPT CLINIC VISIT: HCPCS | Mod: 25

## 2024-07-01 PROCEDURE — 250N000011 HC RX IP 250 OP 636: Mod: JZ | Performed by: NURSE PRACTITIONER

## 2024-07-01 PROCEDURE — 99214 OFFICE O/P EST MOD 30 MIN: CPT | Performed by: NURSE PRACTITIONER

## 2024-07-01 RX ORDER — KETOROLAC TROMETHAMINE 30 MG/ML
60 INJECTION, SOLUTION INTRAMUSCULAR; INTRAVENOUS ONCE
Status: COMPLETED | OUTPATIENT
Start: 2024-07-01 | End: 2024-07-01

## 2024-07-01 RX ADMIN — KETOROLAC TROMETHAMINE 60 MG: 60 INJECTION, SOLUTION INTRAMUSCULAR at 16:05

## 2024-07-01 ASSESSMENT — PAIN SCALES - GENERAL: PAINLEVEL: SEVERE PAIN (6)

## 2024-07-01 ASSESSMENT — ASTHMA QUESTIONNAIRES
EMERGENCY_ROOM_LAST_YEAR_TOTAL: ONE
QUESTION_2 LAST FOUR WEEKS HOW OFTEN HAVE YOU HAD SHORTNESS OF BREATH: NOT AT ALL
QUESTION_5 LAST FOUR WEEKS HOW WOULD YOU RATE YOUR ASTHMA CONTROL: COMPLETELY CONTROLLED
QUESTION_1 LAST FOUR WEEKS HOW MUCH OF THE TIME DID YOUR ASTHMA KEEP YOU FROM GETTING AS MUCH DONE AT WORK, SCHOOL OR AT HOME: NONE OF THE TIME
ACT_TOTALSCORE: 25
QUESTION_3 LAST FOUR WEEKS HOW OFTEN DID YOUR ASTHMA SYMPTOMS (WHEEZING, COUGHING, SHORTNESS OF BREATH, CHEST TIGHTNESS OR PAIN) WAKE YOU UP AT NIGHT OR EARLIER THAN USUAL IN THE MORNING: NOT AT ALL
QUESTION_4 LAST FOUR WEEKS HOW OFTEN HAVE YOU USED YOUR RESCUE INHALER OR NEBULIZER MEDICATION (SUCH AS ALBUTEROL): NOT AT ALL
ACT_TOTALSCORE: 25

## 2024-07-01 ASSESSMENT — PATIENT HEALTH QUESTIONNAIRE - PHQ9
10. IF YOU CHECKED OFF ANY PROBLEMS, HOW DIFFICULT HAVE THESE PROBLEMS MADE IT FOR YOU TO DO YOUR WORK, TAKE CARE OF THINGS AT HOME, OR GET ALONG WITH OTHER PEOPLE: VERY DIFFICULT
SUM OF ALL RESPONSES TO PHQ QUESTIONS 1-9: 8
SUM OF ALL RESPONSES TO PHQ QUESTIONS 1-9: 8

## 2024-07-01 ASSESSMENT — ENCOUNTER SYMPTOMS: BACK PAIN: 1

## 2024-07-01 NOTE — PROGRESS NOTES
Assessment & Plan     Acute bilateral back pain, unspecified back location  Ketorolac shortly after my initial impression of grace was that of significant acute pain. This did offer some patient reported relief at about the 20 - 30 minute point.   We will use tizanidine for the muscle spasms and see about possible IR injections since last MRI done are up to day on 6/6/23 at [facility] for the lumbar spine/ SI joint, which I believe is the primary areas of concern. .     DO NOT TAKE ANY NSAID (IBUPROFEN, NAPROXEN, OR DICLOFENAC) UNTIL 6 HOURS PAST  YOUR DOSE OF KETORALAC IN THE CLINIC (THE INJECTION).     I placed a referral for interventional radiology as well.     - ketorolac (TORADOL) injection 60 mg  - tiZANidine (ZANAFLEX) 4 MG tablet; Take 0.5-1 tablets (2-4 mg) by mouth 3 times daily  - diclofenac (VOLTAREN) 50 MG EC tablet; Take 1 tablet (50 mg) by mouth 2 times daily    Back pain, unspecified back location, unspecified back pain laterality, unspecified chronicity  - UA Macroscopic with reflex to Microscopic and Culture; Future  - tiZANidine (ZANAFLEX) 4 MG tablet; Take 0.5-1 tablets (2-4 mg) by mouth 3 times daily  - diclofenac (VOLTAREN) 50 MG EC tablet; Take 1 tablet (50 mg) by mouth 2 times daily  - IR Consultation for IR Exam (Pain Consult); Future    Chronic low back pain with sciatica, sciatica laterality unspecified, unspecified back pain laterality  - tiZANidine (ZANAFLEX) 4 MG tablet; Take 0.5-1 tablets (2-4 mg) by mouth 3 times daily  - diclofenac (VOLTAREN) 50 MG EC tablet; Take 1 tablet (50 mg) by mouth 2 times daily  - IR Consultation for IR Exam (Pain Consult); Future    Chronic SI joint pain  - tiZANidine (ZANAFLEX) 4 MG tablet; Take 0.5-1 tablets (2-4 mg) by mouth 3 times daily  - diclofenac (VOLTAREN) 50 MG EC tablet; Take 1 tablet (50 mg) by mouth 2 times daily  - IR Consultation for IR Exam (Pain Consult); Future            Return if symptoms worsen or fail to improve.    Subjective  "  Liza is a 54 year old, presenting for the following health issues:  Back Pain     History of Present Illness     Answers submitted by the patient for this visit:    Back Pain:  She presents for follow up of back pain.   Liza is a 55 yo patient of this practice with chronic low back pain bilaterally. They do not have a history of upper back pain. About 1-2 weeks ago, they started having a flair of low back pain and then their upper back also started hurting. Describes pain as burning, dull ache, sharp and shooting. Additionally, they report multiple joints as also painful including, right shoulder and left knee as well as \"just most of my joints are hurting to the point that I am near tears\".   Since patient first noticed back pain, pain is: gradually worsening  Does back pain interfere with her job:  Yes  On a scale of 1-10 (10 being the worst), patient describes pain as:  6  What makes back pain worse: lying down, sitting, standing and other   Acupuncture: not tried  Acetaminophen: not tried  Activity or exercise: not helpful  Chiropractor:  Not helpful  Cold: not tried  Heat: not tried  Massage: not helpful  NSAIDS: helpful- has not taken any today.   Opioids: not tried  Physical Therapy: not tried  Rest: not helpful  Steroid Injection: not tried  Stretching: not helpful  Surgery: not tried  TENS unit: not tried  Topical pain relievers: not tried  Other healthcare providers patient is seeing for back pain: Chiropractor for current episode - not helpful      Has felt warmer than usual with some chills but has not checked temperature.   Sick exposures: none  Tick exposures: none known  Other symptoms: red itchy eyes and itching rash to belly area.     We did obtain a UA upon arrival due to reported symptoms on questionnaire and telephone call. Result was negative. Has not had any urinary symptoms.       ---------------------------------------------------------  6/15/2023 MRI LUMBAR SPINE    1.  Lower lumbar " spondylosis.  2.  Small to moderate left central superiorly migrated disc extrusion at L3-L4 displaces and possibly impinges the descending left L4 nerve roots.  3.  Mild to moderate spinal canal stenosis at L3-L4.  4.  Mild to moderate neural foraminal stenosis on the right L3-L4 and bilaterally at L4-L5.  5.  Right sacroiliitis.  Narrative    For Patients: As a result of the 21st Century Cures Act, medical imaging exams and procedure reports are released immediately into your electronic medical record. You may view this report before your referring provider. If you have questions, please contact your health care provider.    EXAM: MR SPINE LUMBAR WWO  LOCATION: [facility] MEDICAL IMAG[facility]  DATE: 6/15/2023    INDICATION: Low Back Pain Malignant Neoplasm Of Upper-outer Quadrant Of Left Female Breast (hc) Generalized Abdominal Pain Estrogen Receptor Positive.  COMPARISON: CT abdomen and pelvis 05/20/2023.  CONTRAST: GADOBUTROL 1 MMOL/ML IV 10 ML VIAL: 10mL  TECHNIQUE: Routine Lumbar Spine MRI without and with IV contrast.    FINDINGS:  Counting down from presumed hypoplastic ribs at T12, there are 5 lumbar type vertebrae including a transitional lumbosacral junction segment designated as a left hemisacralized L5 with a well-formed L5-S1 disc. The cauda equina nerve roots and visualized lower thoracic spinal cord are within normal limits. No abnormal foci of intrathecal enhancement.    Normal alignment of the lumbar vertebrae. Normal lumbar lordosis. Modic type II apposing endplate degenerative signal changes about the disc space at L3-L4. No abnormal vertebral marrow edema in the lumbar spine. Normal vertebral body heights. No fracture, destructive bone lesion or infection. Mild symmetric lower lumbar predominant dorsal paraspinous muscular atrophy. Partially visualized abnormal periarticular enhancing marrow edema about the right sacroiliac joint and asymmetric right sacroiliac joint space loss, consistent with  sacroiliitis.    Findings on a level by level basis are as follows    T12-L1: Normal disc height and signal. No herniation. Normal facets. No spinal canal or neural foraminal stenosis.    L1-L2: Normal disc height and signal. No herniation. Normal facets. No spinal canal or neural foraminal stenosis.    L2-L3: Normal disc height and signal. No herniation. Mild facet arthrosis. No spinal canal or neural foraminal stenosis.    L3-L4: Mild to moderate disc height loss. Loss of disc signal. Disc bulge with superimposed 7 mm AP dimension superiorly migrated left central disc extrusion, which effaces the left lateral recess and displaces the descending left L4 nerve roots. Facet arthrosis. Mild to moderate right and mild left neural foraminal stenosis. Mild to moderate spinal canal stenosis.    L4-L5: Normal disc height. Loss of disc signal. No disc herniation. Asymmetric right advanced facet arthrosis. Mild to moderate bilateral neural foraminal stenosis. No significant spinal canal stenosis.    L5-S1: Normal disc height and signal. No herniation. Mild facet arthrosis. No spinal canal or neural foraminal stenosis.  Images on Order [id number]    Image Retrieve    The full-size image has not yet been retrieved from an outside organization. To retrieve, click the link below.  Scan on 6/15/2023 10:32 AM: MR SPINE LUMBAR WWO  =------------------------------------------------------------------------      She eats 0-1 servings of fruits and vegetables daily.She consumes 0 sweetened beverage(s) daily.She exercises with enough effort to increase her heart rate 9 or less minutes per day.  She exercises with enough effort to increase her heart rate 3 or less days per week.       Objective    /86 (BP Location: Right arm, Patient Position: Sitting, Cuff Size: Adult Large)   Pulse 73   Temp 98.3  F (36.8  C) (Tympanic)   Resp 18   Wt 127.7 kg (281 lb 9.6 oz)   SpO2 96%   BMI 46.86 kg/m    Body mass index is 46.86  kg/m .      Physical Exam   GENERAL: alert and no distress  NECK: no adenopathy, no asymmetry, masses, or scars  RESP: lungs clear to auscultation - no rales, rhonchi or wheezes  CV: regular rate and rhythm, normal S1 S2, no S3 or S4, no murmur, click or rub, no peripheral edema  ABDOMEN: soft, nontender, no hepatosplenomegaly, no masses and bowel sounds normal  MS: Sitting still and supporting self with arms on knees. Wincing. Increased sit to stand time with using furniture for support.  Paraspinal muscles of thoracic  and tight as are paraspinal muscles of lumbar. So bony point tenderness or step off. No discoloration.   NEURO: Normal strength and tone, sensory exam grossly normal, mentation intact, and speech normal  PSYCH: mentation appears normal, affect normal/bright    Results for orders placed or performed in visit on 07/01/24   UA Macroscopic with reflex to Microscopic and Culture     Status: Normal    Specimen: Urine, Clean Catch   Result Value Ref Range    Color Urine Yellow Colorless, Straw, Light Yellow, Yellow    Appearance Urine Clear Clear    Glucose Urine Negative Negative mg/dL    Bilirubin Urine Negative Negative    Ketones Urine Negative Negative mg/dL    Specific Gravity Urine 1.020 1.003 - 1.035    Blood Urine Negative Negative    pH Urine 6.0 5.0 - 7.0    Protein Albumin Urine Negative Negative mg/dL    Urobilinogen Urine 0.2 0.2, 1.0 E.U./dL    Nitrite Urine Negative Negative    Leukocyte Esterase Urine Negative Negative    Narrative    Microscopic not indicated             Signed Electronically by: Yanet Britton, CNP

## 2024-07-01 NOTE — TELEPHONE ENCOUNTER
Could double book at 3:30 with lab only first. Otherwise okay to see if [facility] has a spot available.

## 2024-07-01 NOTE — TELEPHONE ENCOUNTER
10:47 AM    Reason for Call: OVERBOOK    Patient is having the following symptoms: back pain for 10 days.    The patient is requesting an appointment for today with Tobi - [facility]no[facility] to see another provider - pt is okay to be seen in [place] if there is availability.    Was an appointment offered for this call? Yes  If yes : Appointment type              Date 07/02    Preferred method for responding to this message: Telephone Call  What is your phone number? [phone]    If we cannot reach you directly, may we leave a detailed response at the number you provided? Yes    Can this message wait until your PCP/provider returns, if unavailable today? Not applicable    Irma Reno

## 2024-07-01 NOTE — PATIENT INSTRUCTIONS
DO NOT TAKE ANY NSAID (IBUPROFEN, NAPROXEN, OR DICLOFENAC) UNTIL 6 HOURS PAST  YOUR DOSE OF KETORALAC IN THE CLINIC (THE INJECTION).     I placed a referral for interventional radiology as well.

## 2024-08-14 ENCOUNTER — NURSE TRIAGE (OUTPATIENT)
Dept: FAMILY MEDICINE | Facility: OTHER | Age: 54
End: 2024-08-14

## 2024-08-14 NOTE — TELEPHONE ENCOUNTER
"Patient called to clinic reporting Moderate pain in her eye lid.  Completed assessment, when patient was asked about any swelling, she looked in the mirror and states \"never mind, I think this is a stye.\"  Patient states she will apply some warm moist compresses and call back to the clinic with any additional concerns.    Additional Information   Negative: Followed an eye injury   Negative: Eye pain from chemical in the eye   Negative: Eye pain from foreign body in eye   Negative: Has sinus pain or pressure    Answer Assessment - Initial Assessment Questions  1. ONSET: \"When did the pain start?\" (e.g., minutes, hours, days)      Last night  2. TIMING: \"Does the pain come and go, or has it been constant since it started?\" (e.g., constant, intermittent, fleeting)      constant  3. SEVERITY: \"How bad is the pain?\"   (Scale 1-10; mild, moderate or severe)    - MILD (1-3): doesn't interfere with normal activities     - MODERATE (4-7): interferes with normal activities or awakens from sleep     - SEVERE (8-10): excruciating pain and patient unable to do normal activities      Moderate  4. LOCATION: \"Where does it hurt?\"  (e.g., eyelid, eye, cheekbone)      It feels like a stye- lower lid, but nothing is there  5. CAUSE: \"What do you think is causing the pain?\"      Not sure  6. VISION: \"Do you have blurred vision or changes in your vision?\"       No  7. EYE DISCHARGE: \"Is there any discharge (pus) from the eye(s)?\"  If yes, ask: \"What color is it?\"       Small amount of clear drainage  8. FEVER: \"Do you have a fever?\" If so, ask: \"What is it, how was it measured, and when did it start?\"       No, hasn't checked  9. OTHER SYMPTOMS: \"Do you have any other symptoms?\" (e.g., headache, nasal discharge, facial rash)      None  10. PREGNANCY: \"Is there any chance you are pregnant?\" \"When was your last menstrual period?\"        No    Protocols used: Eye Pain-[facility]    "

## 2024-08-14 NOTE — Clinical Note
FYI- Patient called in with eye pain, she thinks it is a stye.  Has had in the past, will use warm compress and call back to clinic if needed.

## 2024-08-14 NOTE — TELEPHONE ENCOUNTER
Symptom or reason needing to speak to RN: Sudden right eye pain      Best number to return call: [phone]     Best time to return call: Anytime today

## 2024-08-15 NOTE — TELEPHONE ENCOUNTER
Additional Information    Negative: Severe eye pain    Negative: Complete loss of vision in one or both eyes    Negative: Eyelids are very swollen (shut or almost) and fever    Negative: Eyelid (outer) is very red and fever    Negative: Foreign body sensation ('feels like something is in there') and irrigation didn't help    Negative: Vomiting    Negative: Ulcer or sore seen on the cornea (clear center part of the eye)    Negative: Recent eye surgery and increasing eye pain    Negative: Blurred vision and new or worsening    Negative: Patient sounds very sick or weak to the triager    Negative: Eye pain/discomfort and more than mild    Negative: Eyelids are very swollen (shut or almost) and no fever    Negative: Painful rash near eye and multiple small blisters grouped together    Negative: Pain followed bright light exposure from welding    Negative: Looking at light causes severe pain (i.e., photophobia)    Negative: Yellow or green pus occurs    Negative: Eye pain present > 24 hours    Negative: Patient wants to be seen    Negative: Mild eyelid pain is a recurrent problem and red and crusty eyelids    Negative: Mild eye pains are a recurrent problem    Protocols used: Eye Pain-A-[facility]

## 2024-09-12 ENCOUNTER — TELEPHONE (OUTPATIENT)
Dept: FAMILY MEDICINE | Facility: OTHER | Age: 54
End: 2024-09-12

## 2024-09-12 NOTE — TELEPHONE ENCOUNTER
11:54 AM    Reason for Call: Phone Call    Description: PT called in saying she is having increased back pain going down Right leg- she is not sure if she should come in, she just needs some advice.     Was an appointment offered for this call? No      Preferred method for responding to this message: Telephone Call  What is your phone number ? [phone]    If we cannot reach you directly, may we leave a detailed response at the number you provided? Yes    Can this message wait until your PCP/provider returns, if available today? N/A    Sylvester Blanco

## 2024-09-13 NOTE — TELEPHONE ENCOUNTER
This is a common symptom of back pain. Encourage to rest this weekend. Use heat. Continue diclofenac. If not taken acetaminophen and able to tolerate:  take 1000 mg three times a day. If pain is too severe, ER or urgent care. Keep appointment of injection. Would also recommend PT referral.

## 2024-09-16 ENCOUNTER — TELEPHONE (OUTPATIENT)
Dept: INTERVENTIONAL RADIOLOGY/VASCULAR | Facility: HOSPITAL | Age: 54
End: 2024-09-16

## 2024-09-16 NOTE — TELEPHONE ENCOUNTER
Left a message to remind patient of their angelo on 9/18. Also reminded patient to not take any antibiotics, steroids, or immunizations two weeks before and after this appt. And they need a .

## 2024-09-18 ENCOUNTER — HOSPITAL ENCOUNTER (OUTPATIENT)
Facility: HOSPITAL | Age: 54
Discharge: HOME | End: 2024-09-18
Attending: RADIOLOGY | Admitting: RADIOLOGY
Payer: COMMERCIAL

## 2024-09-18 ENCOUNTER — HOSPITAL ENCOUNTER (OUTPATIENT)
Dept: GENERAL RADIOLOGY | Facility: HOSPITAL | Age: 54
Discharge: HOME | End: 2024-09-18
Attending: NURSE PRACTITIONER | Admitting: RADIOLOGY
Payer: COMMERCIAL

## 2024-09-18 DIAGNOSIS — M54.40 CHRONIC LOW BACK PAIN WITH SCIATICA, SCIATICA LATERALITY UNSPECIFIED, UNSPECIFIED BACK PAIN LATERALITY: ICD-10-CM

## 2024-09-18 DIAGNOSIS — G89.29 CHRONIC SI JOINT PAIN: ICD-10-CM

## 2024-09-18 DIAGNOSIS — G89.29 CHRONIC LOW BACK PAIN WITH SCIATICA, SCIATICA LATERALITY UNSPECIFIED, UNSPECIFIED BACK PAIN LATERALITY: ICD-10-CM

## 2024-09-18 DIAGNOSIS — M54.9 BACK PAIN, UNSPECIFIED BACK LOCATION, UNSPECIFIED BACK PAIN LATERALITY, UNSPECIFIED CHRONICITY: ICD-10-CM

## 2024-09-18 DIAGNOSIS — M53.3 CHRONIC SI JOINT PAIN: ICD-10-CM

## 2024-09-18 PROCEDURE — 250N000011 HC RX IP 250 OP 636: Performed by: RADIOLOGY

## 2024-09-18 PROCEDURE — 62323 NJX INTERLAMINAR LMBR/SAC: CPT

## 2024-09-18 RX ORDER — IOPAMIDOL 612 MG/ML
15 INJECTION, SOLUTION INTRATHECAL ONCE
Status: COMPLETED | OUTPATIENT
Start: 2024-09-18 | End: 2024-09-18

## 2024-09-18 RX ORDER — DEXAMETHASONE SODIUM PHOSPHATE 10 MG/ML
10 INJECTION, SOLUTION INTRAMUSCULAR; INTRAVENOUS ONCE
Status: COMPLETED | OUTPATIENT
Start: 2024-09-18 | End: 2024-09-18

## 2024-09-18 RX ADMIN — DEXAMETHASONE SODIUM PHOSPHATE 10 MG: 10 INJECTION, SOLUTION INTRAMUSCULAR; INTRAVENOUS at 14:40

## 2024-09-18 RX ADMIN — IOPAMIDOL 5 ML: 612 INJECTION, SOLUTION INTRATHECAL at 14:40

## 2024-09-18 ASSESSMENT — ACTIVITIES OF DAILY LIVING (ADL): ADLS_ACUITY_SCORE: 35

## 2024-10-01 ENCOUNTER — TELEPHONE (OUTPATIENT)
Dept: INTERVENTIONAL RADIOLOGY/VASCULAR | Facility: HOSPITAL | Age: 54
End: 2024-10-01

## 2024-10-01 NOTE — TELEPHONE ENCOUNTER
INJECTION POST CALL    Was this an IR Referral? YES    Procedure: Epidural TL L3-4  Radiologist(s): Dr. Chintan Mojica  Date of Procedure: 9/18/24    I responded to the patient's questions/concerns.    Relief of pain from this injection    A = 90%  A- = 85%  B = 80%  B- =75%  C = 70%  C- = 65%  D = 60%  D- = 50%  F = less than 50%       Did you get any relief from this injection in the past 2 weeks? No, patient states the injection did not help her. 0% of relief.     If yes, how long did the relief last? Patient received no relief.  Are you still feeling relief? (2 weeks out) No  Would you say this injection has been beneficial? N/A      Was there one injection that worked better than the other? First injection    Where is the pain? Patient states the pain is located in her really low back below the waistline, almost in her SI joints. It hurts mostly on the right  Can you describe the pain? Deep, throbbing , sharp  Does the pain radiate anywhere? Yes  If yes, where does it radiate and where does the pain stop? The pain she states after her injection her whole back to her waistline started to hurt.     Is this new? Yes: After the injection her whole back to the waistline started to hurt.    Patient would like to pursue another injection.    Instruct patient to follow up with their provider for any further care they may need.    Becca Burt

## 2024-10-04 ENCOUNTER — TELEPHONE (OUTPATIENT)
Dept: FAMILY MEDICINE | Facility: OTHER | Age: 54
End: 2024-10-04

## 2024-10-04 NOTE — TELEPHONE ENCOUNTER
8:02 AM    Reason for Call: Phone Call    Description: pt called would like NP Tobi to write a letter for her dog can be her emotional support animal for her depression    Was an appointment offered for this call? No  If yes : Appointment type              Date    Preferred method for responding to this message: Telephone Call  What is your phone number ? [phone]     If we cannot reach you directly, may we leave a detailed response at the number you provided? Yes    Can this message wait until your PCP/provider returns, if available today? Ban Reid

## 2024-10-11 ENCOUNTER — VIRTUAL VISIT (OUTPATIENT)
Dept: ENDOCRINOLOGY | Facility: CLINIC | Age: 54
End: 2024-10-11
Payer: COMMERCIAL

## 2024-10-11 ENCOUNTER — TELEPHONE (OUTPATIENT)
Dept: ENDOCRINOLOGY | Facility: CLINIC | Age: 54
End: 2024-10-11

## 2024-10-11 VITALS — WEIGHT: 280 LBS | HEIGHT: 66 IN | BODY MASS INDEX: 45 KG/M2

## 2024-10-11 DIAGNOSIS — E66.01 CLASS 3 SEVERE OBESITY WITH SERIOUS COMORBIDITY AND BODY MASS INDEX (BMI) OF 45.0 TO 49.9 IN ADULT, UNSPECIFIED OBESITY TYPE (H): Primary | ICD-10-CM

## 2024-10-11 DIAGNOSIS — E66.813 CLASS 3 SEVERE OBESITY WITH SERIOUS COMORBIDITY AND BODY MASS INDEX (BMI) OF 45.0 TO 49.9 IN ADULT, UNSPECIFIED OBESITY TYPE (H): Primary | ICD-10-CM

## 2024-10-11 DIAGNOSIS — K21.9 GASTROESOPHAGEAL REFLUX DISEASE WITHOUT ESOPHAGITIS: ICD-10-CM

## 2024-10-11 DIAGNOSIS — Z71.3 NUTRITIONAL COUNSELING: ICD-10-CM

## 2024-10-11 PROCEDURE — 99207 PR NO CHARGE LOS: CPT | Mod: 95 | Performed by: DIETITIAN, REGISTERED

## 2024-10-11 PROCEDURE — 99443 PR PHYSICIAN TELEPHONE EVALUATION 21-30 MIN: CPT | Mod: 93

## 2024-10-11 PROCEDURE — 97803 MED NUTRITION INDIV SUBSEQ: CPT | Mod: 95 | Performed by: DIETITIAN, REGISTERED

## 2024-10-11 RX ORDER — SEMAGLUTIDE 0.25 MG/.5ML
0.25 INJECTION, SOLUTION SUBCUTANEOUS WEEKLY
Qty: 2 ML | Refills: 0 | Status: SHIPPED | OUTPATIENT
Start: 2024-10-11

## 2024-10-11 RX ORDER — FAMOTIDINE 20 MG/1
20 TABLET, FILM COATED ORAL 2 TIMES DAILY PRN
Qty: 180 TABLET | Refills: 1 | Status: SHIPPED | OUTPATIENT
Start: 2024-10-11

## 2024-10-11 RX ORDER — SEMAGLUTIDE 0.5 MG/.5ML
0.5 INJECTION, SOLUTION SUBCUTANEOUS WEEKLY
Qty: 2 ML | Refills: 2 | Status: SHIPPED | OUTPATIENT
Start: 2024-11-10

## 2024-10-11 ASSESSMENT — PAIN SCALES - GENERAL: PAINLEVEL: MILD PAIN (3)
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
## 2024-10-11 NOTE — PATIENT INSTRUCTIONS
Sung De Jesus,     Follow-up with RD on 12/9    Thank you,    Nga Kulkarni, RD, LD  If you would like to schedule or reschedule an appointment with the RD, please call [phone]    Nutrition Goals  1) Increase daytime lean protein intake. Consume 20-30 gm protein at each meal, three meals daily.  2) Increase fiber intake as able. Include a fruit/veggie with each meal.     CALCIUM  Recommendations:  Teenagers and premenopausal women: 1200 mg/day  Pregnant and Lactating women: 1500 mg/day  Men and Postmenopausal women on estrogen: 1200mg/day  Postmenopausal women not on estrogen: 1500 mg/day    Foods with serving size that provide at least 300 mg calcium:  - 8 oz of skim milk, unsweetened soy milk or unsweetened almond milk  - 1/2 cup part-skim ricotta  - 1.5-2 cups of greens (beet, mitesh, turnip, bok gus, mustard, kale, spinach)  - 3 oz of sardines (canned, drained)  - 8 oz of yogurt (confirm on label)   - 1.5 oz cheese (provolone, lesly, Swiss, mozzarella, muenster, cheddar)      Meal Replacement Shake Options:   *Protein Shake Criteria: no more than 210 Calories, at least 20 grams of protein, and less than 10 grams of sugar   Premier Protein (160 Calories, 30 g protein)  Slim Fast Advanced Nutrition (180 Calories, 20 g protein)  Muscle Milk, lactose-free, 17 oz bottle (210 Calories, 30 g protein)  Integrated Supplements, no artificial sugars (110 Calories, 20 g protein)  Boost/Ensure Max (160 calories, 30 gm protein)   Fairlife Protein Shakes (160-230 calories, 26-42 gm protein)  Aldi's Elevation Protein Powder (180 calories, 30 gm protein)   Orgain Protein Shakes (130-160 calories, 20-26 gm protein)     Meal Replacement Bar Options:  Quest Protein Bars (190 Calories, 20 g protein)  Built Bar (170 Calories, 15-20 g protein)  One Protein Bar (210 calories, 20 g protein)  Seo Signature Protein Bar ([employer]) (190 Calories, 21 g protein)  Pure Protein Bars (180 Calories, 21 g protein)    Low Calorie Frozen  Meal:  Healthy Choice Power Bowls  Lean Cuisine  Smart Ones  Maikel Dukestoy       The Plate Method  [email or web address]    Protein Sources   [email or web address]     Carbohydrates  [email or web address]     Mindful Eating  [email or web address]     Summary of Volumetrics Eating Plan  [email or web address]         COMPREHENSIVE WEIGHT MANAGEMENT PROGRAM  VIRTUAL SUPPORT GROUPS    At [facility], our Comprehensive Weight Management program offers on-line support groups for patients who are working on weight loss and considering, preparing for, or have had weight loss surgery.     There is no cost for this opportunity.  You are invited to attend the?Virtual Support Groups?provided by any of the following locations:    [facility] via Microsoft Teams with Rosario Hinkle RN  2.   [facility] via KaritKarma with Hilario Aly, PhD, LP  3.   [facility] via KaritKarma with Saranya Pal RN  4.   [facility] via a Zoom Meeting with CRESCENCIO Chadwick    The following Support Group information can also be found on our website:  [email or web address]      [facility] Weight Loss Surgery Support Group  The support group is a patient-lead forum that meets monthly to share experiences, encouragement and education. It is open to those who have had weight loss surgery, are scheduled for surgery, or are considering surgery.   WHEN: This group meets on the 3rd Wednesday of each month from 5:00PM - 6:00PM virtually using Microsoft Teams.   FACILITATOR: Led by Rosario Hampton, RD, LD, RN, the program's Clinical Coordinator.   TO REGISTER: Please contact the clinic via Zoomorama or call the nurse line directly at [phone] to inform our staff that you would like an invite sent to you and to let us know the email you would like the invite sent to. Prior to the meeting, a link  "with directions on how to join the meeting will be sent to you.    2023 and 2024 Meetings   December 20  January 17  February 21  March 20  April 17  May 15  Riri 19      [facility] Bariatric Care Support Group?  This is open to all pre- and post- operative bariatric surgery patients as well as their support system.   WHEN: This group meets the 3rd Tuesday of each month from 6:30 PM - 8:00 PM virtually using Microsoft Teams.   FACILITATOR: Led by Hilario Aly, Ph.D who is a Licensed Psychologist with the [facility] Comprehensive Weight Management Program.   TO REGISTER: Please send an email to Hilario Aly, Ph.D.,  at?[email or web address]?if you would like an invitation to the group. Prior to the meeting, a link with directions on how to join the meeting will be sent to you.    2023 and 2024 Meetings  December 19 January 16: \"Medication Management and Bariatric Surgery\", Linda Durham, PharmD, Pharmacy Resident at [facility]  February 20: \"A Bariatric Surgery Patient's Perspective\", FRANCI Apple, [facility], Behavioral Health Clinician at [facility]  March 19  April 16  May 21  Riri 18: \"Nutritional Labeling\", Dietitian speaker to be announced.  November 19: \"Holiday Eating\", Dietitian speaker to be announced.    [facility] Post-Operative Bariatric Surgery Support Group  This is a support group for [facility] bariatric patients (and those external to [facility]) who have had bariatric surgery and are at least 3 months post-surgery.  WHEN: This support group meets the 4th Wednesday of the month from 11:00 AM - 12:00 PM virtually using Microsoft Teams.   FACILITATOR: Led by Certified Bariatric Nurse, Saranya Pal RN.   TO REGISTER: Please send an email to Sraanya at [email or web address] if you would like an invitation to the " group.  Prior to the meeting, a link with directions on how to join the meeting will be sent to you.    2023 and 2024 Meetings  December 27  January 24  February 28  March 27  April 24  May 22  Riri 26    [facility] Healthy Lifestyle Group    Healthy Lifestyle Coaching Group?  This is a 60 minute virtual coaching group for those who want to lead a healthier lifestyle. Come together to set goals and overcome barriers in a supportive group environment. We will address the four pillars of health--nutrition, exercise, sleep and emotional well-being. This group is highly recommended for those who are participating in the 24 week Healthy Lifestyle Plan and our Health Coaching sessions. All are welcome!    WHEN: This group meets the first Friday of the month, 12:30 PM - 1:30 PM online, via a zoom meeting.      FACILITATOR: Led by National Board Certified Health and , Saranya Ramsay [facility].    TO REGISTER: Please call the Call Center at [phone] to register. You will get an appointment to attend in [place]. Fifteen minutes prior to the meeting, complete the e-check in and you will get the link to join the meeting.  There is no charge to attend this group and space is limited.  Please register for each month you wish to attend    PLEASE NOTE: There will be NO GROUP on March 7 and July 4, 2025 2024 and 2025 GROUP MEETING DATES:  October 4, 2024  November 1, 2024  December 6, 2024  January 3, 2025  February 7, 2025  No meeting March 7, 2025  April 4, 2025  May 2, 2025  June 6, 2025  No meeting July 4, 2025 August 1, 2025

## 2024-10-11 NOTE — LETTER
"10/11/2024       RE: Liza Goins  [address]  [facility] [address]     Dear Colleague,    Thank you for referring your patient, Liza Goins, to the [facility] at [facility]. Please see a copy of my visit note below.    Video-Visit Details    Type of service:  Video Visit    Video Start Time: 12:50 PM   Video End Time: 1:28 PM    Originating Location (pt. Location): Home    Distant Location (provider location):  Offsite (providers home) [facility]     Platform used for Video Visit: What's in My Handbag      New Weight Management Nutrition Consultation    Liza Goins is a 54 year old female presents today for new weight management nutrition consultation.  Patient referred by Nena Kolb PA-C on October 11, 2024.    Patient with Co-morbidities of obesity including:      10/11/2024    11:02 AM   --   I have the following health issues associated with obesity Sleep Apnea    Polycystic Ovarian Syndrome    Asthma   I have the following symptoms associated with obesity Knee Pain    Depression    Lower Extremity Swelling    Back Pain    Fatigue    Hip Pain       Anthropometrics:  Estimated body mass index is 45.89 kg/m  as calculated from the following:    Height as of an earlier encounter on 10/11/24: 1.664 m (5' 5.5\").    Weight as of an earlier encounter on 10/11/24: 127 kg (280 lb).    Medications for Weight Loss:  Wegovy prescribed at [facility] mg today.      NUTRITION HISTORY  Food allergies: None  Food intolerances: bread, noodles, hot dogs - itchiness and upset stomach. Avoiding.   Vitamin/Mineral Supplements: None     Previous methods of diet modification for weight loss: Last spring lost down to 250 lbs, watching what she was eating, can't remember exactly diet changes at the time.   Remembers losing inches when walking 2 miles to and from school 30 years ago.   RD before: " Previously seen by RD in [phone] as she was considering bariatric surgery at the time.     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 Typically has dinner after this, starving by this time as she doesn't have anything to eat during the day.   Sleep: 2-7 hours nightly.   Pt goals, lose weight and build some muscle.     Recent Diet Recall:  Breakfast: Holiday pizza and Mt Dew yesterday; usually doesn't do breakfast  Lunch: jerky; Caesar pati with chicken and tomatoes   Dinner: pork loin/chicken/beef   Beverages: Water   Alcohol: rarely   Dining Out/take-out: more in last couple weeks ([employer]), usually less. More prone to eat out if doesn't have something quick to eat at home for dinner.           10/11/2024    11:02 AM   Diet Recall Review with Patient   If you do eat lunch, what types of food do you typically eat? meats   If you do eat supper, what types of food do you typically eat? meats, carbs   How many glasses of juice do you drink in a typical day? 0   How many of glasses of milk do you drink in a typical day? 0   How many 8oz glasses of sugar containing drinks such as Homar-Aid/sweet tea do you drink in a day? 0   How many cans/bottles of sugar pop/soda/tea/sports drinks do you drink in a day? 0   How many cans/bottles of diet pop/soda/tea or sports drink do you drink in a day? 0   How often do you have a drink of alcohol? Monthly or Less   If you do drink, how many drinks might you have in a day? 1 or 2           10/11/2024    11:02 AM   Eating Habits   Generally, my meals include foods like these bread, pasta, rice, potatoes, corn, crackers, sweet dessert, pop, or juice Less Than Weekly   Generally, my meals include foods like these fried meats, brats, burgers, french fries, pizza, cheese, chips, or ice cream Less Than Weekly   Eat fast food (like [employer], [employer], Taco Bell) A Few Times a Week   Eat at a buffet or sit-down restaurant  Never   Eat most of my meals in front of the TV or computer Never   Often skip meals, eat at random times, have no regular eating times Everyday   Rarely sit down for a meal but snack or graze throughout A Few Times a Week   Eat extra snacks between meals Never   Eat most of my food at the end of the day Almost Everyday   Eat in the middle of the night or wake up at night to eat Less Than Weekly   Eat extra snacks to prevent or correct low blood sugar Never   Eat to prevent acid reflux or stomach pain Never   Worry about not having enough food to eat Never   I eat when I am depressed Never   I eat when I am stressed Never   I eat when I am bored A Few Times a Week   I eat when I am anxious Never   I eat when I am happy or as a reward Less Than Weekly   I feel hungry all the time even if I just have eaten A Few Times a Week   Feeling full is important to me Never   I finish all the food on my plate even if I am already full A Few Times a Week   I can't resist eating delicious food or walk past the good food/smell Almost Everyday   I eat/snack without noticing that I am eating A Few Times a Week   I eat when I am preparing the meal Almost Everyday   I eat more than usual when I see others eating A Few Times a Week   I have trouble not eating sweets, ice cream, cookies, or chips if they are around the house Everyday   I think about food all day Never   What foods, if any, do you crave? Chips/Crackers   Please list any other foods you crave? chips, ice cream         10/11/2024    11:02 AM   Amount of Food   I feel out of control when eating Weekly   I eat a large amount of food, like a loaf of bread, a box of cookies, a pint/quart of ice cream, all at once Never   I eat a large amount of food even when I am not hungry Almost Everyday   I eat rapidly Never   I eat alone because I feel embarrassed and do not want others to see how much I have eaten Never   I eat until I am uncomfortably full Never   I feel bad, disgusted,  or guilty after I overeat Almost Everyday     Physical Activity:  Limited d/t chronic back pain        10/11/2024    11:02 AM   Activity/Exercise History   How much of a typical 12 hour day do you spend sitting? Half the Day   How much of a typical 12 hour day do you spend lying down? Less Than Half the Day   How much of a typical day do you spend walking/standing? Half the Day   How many hours (not including work) do you spend on the TV/Video Games/Computer/Tablet/Phone? 2-3 Hours   How many times a week are you active for the purpose of exercise? Never   What keeps you from being more active? Pain   How many total minutes do you spend doing some activity for the purpose of exercising when you exercise? More Than 30 Minutes       Nutrition Prescription  Recommended energy/nutrient modification.    Nutrition Diagnosis  Obesity r/t long history of positive energy balance aeb BMI >30.    Nutrition Intervention  Materials/education provided on hypocaloric diet for weight loss. Discussed Volumetric eating to help satiety level on fewer calories; portion control and healthy food choices (Plate Method and Volumetrics handouts), lower calorie snack choices, meal and snack planning tips and resources. Provided education on nutrition considerations when starting GLP1 medication including, changes in meal/snack volumes; meeting protein, hydration and micronutrient needs; limiting high-fat foods; and diet/lifestyle strategies for preventing constipation.   Patient demonstrates understanding.  Co-developed goals to work towards.   Provided pt with list of goals and resources below via mail. Encouraged she set up MyChart.     Expected Engagement: good  Follow-Up Plans: meal/snack planning     Nutrition Goals  1) Increase daytime lean protein intake. Consume 20-30 gm protein at each meal, three meals daily.  2) Increase fiber intake as able. Include a fruit/veggie with each meal.     CALCIUM  Recommendations:  Teenagers and  premenopausal women: 1200 mg/day  Pregnant and Lactating women: 1500 mg/day  Men and Postmenopausal women on estrogen: 1200mg/day  Postmenopausal women not on estrogen: 1500 mg/day    Foods with serving size that provide at least 300 mg calcium:  - 8 oz of skim milk, unsweetened soy milk or unsweetened almond milk  - 1/2 cup part-skim ricotta  - 1.5-2 cups of greens (beet, mitesh, turnip, bok gus, mustard, kale, spinach)  - 3 oz of sardines (canned, drained)  - 8 oz of yogurt (confirm on label)   - 1.5 oz cheese (provolone, lesly, Swiss, mozzarella, muenster, cheddar)      Meal Replacement Shake Options:   *Protein Shake Criteria: no more than 210 Calories, at least 20 grams of protein, and less than 10 grams of sugar   Premier Protein (160 Calories, 30 g protein)  Slim Fast Advanced Nutrition (180 Calories, 20 g protein)  Muscle Milk, lactose-free, 17 oz bottle (210 Calories, 30 g protein)  Integrated Supplements, no artificial sugars (110 Calories, 20 g protein)  Boost/Ensure Max (160 calories, 30 gm protein)   Fairlife Protein Shakes (160-230 calories, 26-42 gm protein)  Aldi's Elevation Protein Powder (180 calories, 30 gm protein)   Orgain Protein Shakes (130-160 calories, 20-26 gm protein)     Meal Replacement Bar Options:  Quest Protein Bars (190 Calories, 20 g protein)  Built Bar (170 Calories, 15-20 g protein)  One Protein Bar (210 calories, 20 g protein)  [place] Signature Protein Bar ([employer]) (190 Calories, 21 g protein)  Pure Protein Bars (180 Calories, 21 g protein)    Low Calorie Frozen Meal:  Healthy Choice Power Bowls  Lean Cuisine  Smart Ones  Maikel Dukesights       The Plate Method  [email or web address]    Protein Sources   [email or web address]     Carbohydrates  [email or web address]     Mindful Eating  [email or web address]     Summary of Volumetrics Eating Plan  [email or web address]     Follow-Up:  12/9/24    Time spent with patient: 38  minutes.  Nga Kulkarni RD, LD        Again, thank you for allowing me to participate in the care of your patient.      Sincerely,    Nga Kulkarni RD

## 2024-10-11 NOTE — NURSING NOTE
Current patient location: [address]   [facility] [address]    Is the patient currently in the state of [place]? YES    Visit mode:VIDEO    If the visit is dropped, the patient can be reconnected by: VIDEO VISIT: Text to cell phone:   Telephone Information:   Mobile [phone]       Will anyone else be joining the visit? NO  (If patient encounters technical issues they should call [phone])    Are changes needed to the allergy or medication list? N/A    Are refills needed on medications prescribed by this physician? NO    Rooming Documentation:  Questionnaire(s) not pre-assigned    Reason for visit: Consult    Neo NUÑEZ

## 2024-10-11 NOTE — PROGRESS NOTES
"Virtual Visit Details    Type of service:  Video Visit   Video Start Time: {video visit start/end time for provider to select:070233}  Video End Time:{video visit start/end time for provider to select:654956}    Originating Location (pt. Location): {video visit patient location:470202::\"Home\"}  {PROVIDER LOCATION On-site should be selected for visits conducted from your clinic location or adjoining [facility] hospital, academic office, or other nearby [facility] building. Off-site should be selected for all other provider locations, including home:507742}  Distant Location (provider location):  {virtual location provider:935440}  Platform used for Video Visit: {Virtual Visit Platforms:574924::\"OUYA\"}    "

## 2024-10-11 NOTE — NURSING NOTE
Current patient location: [address]   [facility] [address]    Is the patient currently in the state of [place]? YES    Visit mode:VIDEO    If the visit is dropped, the patient can be reconnected by: VIDEO VISIT: Text to cell phone:   Telephone Information:   Mobile [phone]       Will anyone else be joining the visit? NO  (If patient encounters technical issues they should call [phone] :[phone])    Are changes needed to the allergy or medication list? No    Are refills needed on medications prescribed by this physician? NO    Rooming Documentation:  Questionnaire(s) completed    Reason for visit: Consult    Pt states 3/10 low back pain chronic with some mid/upper back as well.    Neo Marquez VVF
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
## 2024-10-11 NOTE — TELEPHONE ENCOUNTER
PA Initiation    Medication: WEGOVY 0.25 MG/0.5ML SC SOAJ  Insurance Company: [employer] - Phone [phone] Fax [phone]  Pharmacy Filling the Rx:    Filling Pharmacy Phone:    Filling Pharmacy Fax:    Start Date: 10/11/2024    [id number]

## 2024-10-11 NOTE — PATIENT INSTRUCTIONS
"Sung De Jesus, it was nice to meet you today!  Thank you for allowing us the privilege of caring for you. We hope we provided you with the excellent service you deserve.   Please let us know if there is anything else we can do for you so that we can be sure you are completely satisfied with your care experience.    To ensure the quality of our services you may be receiving a patient satisfaction survey from an independent patient satisfaction monitoring company.    The greatest compliment you can give is a \"Likely to Recommend\"    Your visit was with Nena Kolb PA-C today.    Instructions per today's visit:     Start Wegovy 0.25mg once weekly for 4 weeks, then increase to 0.5mg once weekly. Consider Zepbound and Saxenda as needed.   Start Pepcid 20mg twice daily to help with heartburn symptoms.   MTM pharmacist in 6 weeks  Nena Del Toro in 3 months   Dietitian as needed     ___________________________________________________________________________  Important contact and scheduling information:  Please call our contact center at [phone] to schedule your next appointments.  For any nursing questions or concerns call Carissa Red LPN at [phone] or Ashley Damon RN at [phone]  Please call during clinic hours Monday through Friday 8:00a - 4:00p if you have questions or you can contact us via [employer]) at anytime and we will reply during clinic hours.    Lab results will be communicated through My Chart or letter (if My Chart not used). Please call the clinic if you have not received communication after 1 week or if you have any questions.?  Clinic Fax: [phone]  __________________________________________________________________________    If labs were ordered today:    Please make an appointment to have them drawn at your convenience.     To schedule the Lab Appointment using [employer]):  Select \"Schedule an Appointment\"  Select \"Lab Only\"  For \"A couple of questions\", select \"Other\"  For \"Which locations " work for you?, select the location and set up the appointment    To schedule by phone call [phone] to schedule a lab only appointment at any [facility] lab.  ___________________________________________________________________________  Work with A [facility] !  Virtual Sessions are Available through [facility] Weight Management Clinics    To learn more, call to schedule a free, Health  Q&A appointment: [phone]     What is Health Coaching?  Do you know what you are supposed to do, but you just aren't doing it?  Then, HEALTH COACHING may help you!   Get unstuck and move forward with the support of a professionally trained NBC-HWC (National Board-Certified Health and ) who uses evidence-based approaches to help you move forward with healthy lifestyle changes in the areas of weight loss, stress management and overall well-being.    Health Coaches help you identify goals that will work best for you. Health Coaches provide support and encouragement with overcoming barriers and help you to find inspiration and motivation to lead a healthy lifestyle.    Option one:  Health Coaching 3-Pack; Three, 30-minute Health Coaching Visits, for $99  Visits are done virtually (phone or video)  This is a self pay service; we do not accept insurance for aislinn coaching.    Option two:   The 24 week Plan; 11 Health Coaching Visits, and a 7 months subscription to [employer]-- on-demand fitness, nutrition and mindfulness classes, for $499 (employee discounts may be available). Participants will also meet regularly with a weight management Medical Provider and a Registered/Licensed Dietician.  This is a self-pay service; we do not accept insurance for health coaching.    To Schedule a free Health  Q&A appointment to learn more,  call [phone].  ____________________________________________________________________  [facility]  Healthy Lifestyle  "Group    Healthy Lifestyle Group  This is a 60 minute virtual coaching group for those who want to lead a healthier lifestyle. Come together to set goals and overcome barriers in a supportive group environment. We will address the four pillars of health--nutrition, exercise, sleep and emotional well-being.  This group is highly recommended for those who are participating in the 24 week Healthy Lifestyle Plan and our Health Coaching sessions.    WHEN: This group meets the first Friday of the month, 12:30 PM - 1:30 PM online, via a zoom meeting.      FACILITATOR: Led by National Board Certified Health and , Saranya Ramsay, [facility].    TO REGISTER: Please call the Call Center at [phone] to register. You will get an appointment to attend in [facility]. Fifteen minutes prior to the meeting, complete the e-check in and you will get the link to join the meeting.  There is no charge to attend this group and space is limited.      2023 and 2024 Meeting Topics and Dates:    November 3: Introduction to Mindfulness (Learn simple and effective mindfulness practices and how it can benefit you)    December 8: Let's Talk (guided discussion on our wins and challenges)    January 5: New Years Vision: Manifest your Best 2024! (Guided imagery,  journaling and discussion)    February 2: Let's Talk    March 1: 10 Percent Happier by Dylon Reeves (Book Bites; a guided discussion on the nuggets of wisdom from favorite wellness books; no need to read the book but highly encouraged)    April 5: Let's Talk    May 3: \"Essentialism; The Disciplined Pursuit of Less by Luca Carlson (book bites discussion)    June 7: Let's Talk    July 5: NO MEETING, off for the 4th of July Holiday    August 2: The Blue Zones, Secrets for Living a Longer Life by Dylon Handley (book bites discussion)      If you would like bariatric surgery specific support group info please let your care team know.         Thank you,   KISHA Wall" Weight Management Team        WEGOVY (semaglutide)    What is Wegovy?    Wegovy (semaglutide) injection 2.4 mg is an injectable prescription medicine used for adults with obesity (BMI ?30) or overweight (excess weight) (BMI ?27) who also have weight-related medical problems to help them lose weight and keep the weight off.    1.  Start Wegovy (semaglutide) 0.25 mg once weekly for 4 weeks, then if tolerating increase to 0.5 mg weekly for 4 weeks, then if tolerating increase to 1 mg weekly for 4 weeks, then if tolerating increase to 1.7 mg weekly for 4 weeks, then if tolerating increase to 2.4 mg weekly thereafter.    -Each Wegovy pen is a once weekly single-dose prefilled pen with a pen injector already built within the pen. Discard the Wegovy pen after use in sharps container.     2. Storage: make sure that when you get the prescription that you store the prescription in the refrigerator until it is time to use the Wegovy pen.  Once it is time to use the Wegovy pen, you can keep the pen at room temperature and it is good for up to 28 days at room temperature.     3.  Potential common side effects: nausea, headache, diarrhea, stomach upset.  If these become unmanageable or concerning symptoms, please make sure to call or mychart.    4. Wegovy should be discontinued for ?2 months prior to becoming pregnant.     Prior Authorization Process for Weight Management Medications: You are being prescribed a medication that will likely need to undergo a prior authorization.  A prior authorization is when the clinic needs to fill out a form that is sent to your insurance company to obtain coverage for that medication. The prescription will NOT automatically go to your pharmacy today if it needs a Prior Authorization. If the medication prior authorization is approved, the care team will contact you and the prescription will be released to your pharmacy. If denied, we will work to try and appeal the prior authorization if  possible. The initial prior authorization process takes up to 5-10 business days and appeals can take up to 30 days. If you do not hear from us at the end of that time, you may contact the clinic.    Go to site: Wegovy video to learn more and watch instruction videos.    For any questions or concerns please send a [employer]ality message to our team or call our weight management call center at [phone] during regular business hours. For questions during evenings or weekends your messages will be addressed during the next business day.  For emergencies please call 911 or seek immediate medical care.

## 2024-10-11 NOTE — PROGRESS NOTES
"Video-Visit Details    Type of service:  Video Visit    Video Start Time: 12:50 PM   Video End Time: 1:28 PM    Originating Location (pt. Location): Home    Distant Location (provider location):  Offsite (providers home) [facility]     Platform used for Video Visit: MyToons      New Weight Management Nutrition Consultation    Liza Goins is a 54 year old female presents today for new weight management nutrition consultation.  Patient referred by Nena Kolb PA-C on October 11, 2024.    Patient with Co-morbidities of obesity including:      10/11/2024    11:02 AM   --   I have the following health issues associated with obesity Sleep Apnea    Polycystic Ovarian Syndrome    Asthma   I have the following symptoms associated with obesity Knee Pain    Depression    Lower Extremity Swelling    Back Pain    Fatigue    Hip Pain       Anthropometrics:  Estimated body mass index is 45.89 kg/m  as calculated from the following:    Height as of an earlier encounter on 10/11/24: 1.664 m (5' 5.5\").    Weight as of an earlier encounter on 10/11/24: 127 kg (280 lb).    Medications for Weight Loss:  Wegovy prescribed at [facility] today.      NUTRITION HISTORY  Food allergies: None  Food intolerances: bread, noodles, hot dogs - itchiness and upset stomach. Avoiding.   Vitamin/Mineral Supplements: None     Previous methods of diet modification for weight loss: Last spring lost down to 250 lbs, watching what she was eating, can't remember exactly diet changes at the time.   Remembers losing inches when walking 2 miles to and from school 30 years ago.   RD before: Previously seen by RD in [id number] as she was considering bariatric surgery at the time.     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 Typically has dinner after this, starving by this time as she doesn't have anything to eat during the day.   Sleep: 2-7 hours " nightly.   Pt goals, lose weight and build some muscle.     Recent Diet Recall:  Breakfast: Holiday pizza and Mt Dew yesterday; usually doesn't do breakfast  Lunch: jerky; Caesar pati with chicken and tomatoes   Dinner: pork loin/chicken/beef   Beverages: Water   Alcohol: rarely   Dining Out/take-out: more in last couple weeks ([employer]), usually less. More prone to eat out if doesn't have something quick to eat at home for dinner.           10/11/2024    11:02 AM   Diet Recall Review with Patient   If you do eat lunch, what types of food do you typically eat? meats   If you do eat supper, what types of food do you typically eat? meats, carbs   How many glasses of juice do you drink in a typical day? 0   How many of glasses of milk do you drink in a typical day? 0   How many 8oz glasses of sugar containing drinks such as Homar-Aid/sweet tea do you drink in a day? 0   How many cans/bottles of sugar pop/soda/tea/sports drinks do you drink in a day? 0   How many cans/bottles of diet pop/soda/tea or sports drink do you drink in a day? 0   How often do you have a drink of alcohol? Monthly or Less   If you do drink, how many drinks might you have in a day? 1 or 2           10/11/2024    11:02 AM   Eating Habits   Generally, my meals include foods like these bread, pasta, rice, potatoes, corn, crackers, sweet dessert, pop, or juice Less Than Weekly   Generally, my meals include foods like these fried meats, brats, burgers, french fries, pizza, cheese, chips, or ice cream Less Than Weekly   Eat fast food (like McDonalds, Burger Tyshawn, Taco Bell) A Few Times a Week   Eat at a buffet or sit-down restaurant Never   Eat most of my meals in front of the TV or computer Never   Often skip meals, eat at random times, have no regular eating times Everyday   Rarely sit down for a meal but snack or graze throughout A Few Times a Week   Eat extra snacks between meals Never   Eat most of my food at the end of the day Almost Everyday    Eat in the middle of the night or wake up at night to eat Less Than Weekly   Eat extra snacks to prevent or correct low blood sugar Never   Eat to prevent acid reflux or stomach pain Never   Worry about not having enough food to eat Never   I eat when I am depressed Never   I eat when I am stressed Never   I eat when I am bored A Few Times a Week   I eat when I am anxious Never   I eat when I am happy or as a reward Less Than Weekly   I feel hungry all the time even if I just have eaten A Few Times a Week   Feeling full is important to me Never   I finish all the food on my plate even if I am already full A Few Times a Week   I can't resist eating delicious food or walk past the good food/smell Almost Everyday   I eat/snack without noticing that I am eating A Few Times a Week   I eat when I am preparing the meal Almost Everyday   I eat more than usual when I see others eating A Few Times a Week   I have trouble not eating sweets, ice cream, cookies, or chips if they are around the house Everyday   I think about food all day Never   What foods, if any, do you crave? Chips/Crackers   Please list any other foods you crave? chips, ice cream         10/11/2024    11:02 AM   Amount of Food   I feel out of control when eating Weekly   I eat a large amount of food, like a loaf of bread, a box of cookies, a pint/quart of ice cream, all at once Never   I eat a large amount of food even when I am not hungry Almost Everyday   I eat rapidly Never   I eat alone because I feel embarrassed and do not want others to see how much I have eaten Never   I eat until I am uncomfortably full Never   I feel bad, disgusted, or guilty after I overeat Almost Everyday     Physical Activity:  Limited d/t chronic back pain        10/11/2024    11:02 AM   Activity/Exercise History   How much of a typical 12 hour day do you spend sitting? Half the Day   How much of a typical 12 hour day do you spend lying down? Less Than Half the Day   How much  of a typical day do you spend walking/standing? Half the Day   How many hours (not including work) do you spend on the TV/Video Games/Computer/Tablet/Phone? 2-3 Hours   How many times a week are you active for the purpose of exercise? Never   What keeps you from being more active? Pain   How many total minutes do you spend doing some activity for the purpose of exercising when you exercise? More Than 30 Minutes       Nutrition Prescription  Recommended energy/nutrient modification.    Nutrition Diagnosis  Obesity r/t long history of positive energy balance aeb BMI >30.    Nutrition Intervention  Materials/education provided on hypocaloric diet for weight loss. Discussed Volumetric eating to help satiety level on fewer calories; portion control and healthy food choices (Plate Method and Volumetrics handouts), lower calorie snack choices, meal and snack planning tips and resources. Provided education on nutrition considerations when starting GLP1 medication including, changes in meal/snack volumes; meeting protein, hydration and micronutrient needs; limiting high-fat foods; and diet/lifestyle strategies for preventing constipation.   Patient demonstrates understanding.  Co-developed goals to work towards.   Provided pt with list of goals and resources below via mail. Encouraged she set up [employer].     Expected Engagement: good  Follow-Up Plans: meal/snack planning     Nutrition Goals  1) Increase daytime lean protein intake. Consume 20-30 gm protein at each meal, three meals daily.  2) Increase fiber intake as able. Include a fruit/veggie with each meal.     CALCIUM  Recommendations:  Teenagers and premenopausal women: 1200 mg/day  Pregnant and Lactating women: 1500 mg/day  Men and Postmenopausal women on estrogen: 1200mg/day  Postmenopausal women not on estrogen: 1500 mg/day    Foods with serving size that provide at least 300 mg calcium:  - 8 oz of skim milk, unsweetened soy milk or unsweetened almond milk  - 1/2  cup part-skim ricotta  - 1.5-2 cups of greens (beet, mitesh, turnip, bok gus, mustard, kale, spinach)  - 3 oz of sardines (canned, drained)  - 8 oz of yogurt (confirm on label)   - 1.5 oz cheese (provolone, lesly, Swiss, mozzarella, muenster, cheddar)      Meal Replacement Shake Options:   *Protein Shake Criteria: no more than 210 Calories, at least 20 grams of protein, and less than 10 grams of sugar   Premier Protein (160 Calories, 30 g protein)  Slim Fast Advanced Nutrition (180 Calories, 20 g protein)  Muscle Milk, lactose-free, 17 oz bottle (210 Calories, 30 g protein)  Integrated Supplements, no artificial sugars (110 Calories, 20 g protein)  Boost/Ensure Max (160 calories, 30 gm protein)   Fairlife Protein Shakes (160-230 calories, 26-42 gm protein)  Aldi's Elevation Protein Powder (180 calories, 30 gm protein)   Orgain Protein Shakes (130-160 calories, 20-26 gm protein)     Meal Replacement Bar Options:  Quest Protein Bars (190 Calories, 20 g protein)  Built Bar (170 Calories, 15-20 g protein)  One Protein Bar (210 calories, 20 g protein)  [place] Signature Protein Bar ([employer]) (190 Calories, 21 g protein)  Pure Protein Bars (180 Calories, 21 g protein)    Low Calorie Frozen Meal:  Healthy Choice Power Bowls  Lean Cuisine  Smart Ones  Maikel Jason Delights       The Plate Method  [email or web address]    Protein Sources   [email or web address]     Carbohydrates  [email or web address]     Mindful Eating  [email or web address]     Summary of Volumetrics Eating Plan  [email or web address]     Follow-Up:  12/9/24    Time spent with patient: 38 minutes.  Nga Kulkarni, JOVANA, LD

## 2024-10-13 NOTE — ASSESSMENT & PLAN NOTE
Overweight onset in adulthood. Weight gain through 3 pregnancies, hard to lose weight postpartum. Weight gaine through breast cancer treatment. Highest weight in life of 300lbs. Able to lose 20lbs over the past year, but was not sustainable.     Previously seen by Dr. Giraldo 6/5/2023 for MWM. Started phentermine. Has been lost to follow since. Reestablished today to discuss other AOM options.     Discussed other AOMs today:   - Phentermine previously trialed. No side effects. Was not helpful with weight loss  - Topiramate can be considered. No hx of kidney stones or disease. Menopausal.   - Naltrexone can be considered. No hx of liver disease. Not taking opioids.   - GLP-1 to be started today. No contraindications. Discussed side effects, risks, and benefits. No hx of pancreatitis. No personal or family hx of MTC or MENII. No hx of pre or type II diabetes. Has GERD at baseline. Will start famotidine to help and monitor symptoms. Will start Wegovy.

## 2024-10-14 NOTE — TELEPHONE ENCOUNTER
Prior Authorization Approval    Medication: WEGOVY 0.25 MG/0.5ML SC SOAJ  Authorization Effective Date: 10/11/2024  Authorization Expiration Date: 4/11/2024  Approved Dose/Quantity: 2ml per 28 days  Reference #: [id number]   Insurance Company: [employer] - Phone [phone] Fax [phone]  Expected CoPay: $    CoPay Card Available: No    Financial Assistance Needed:   Which Pharmacy is filling the prescription:    Pharmacy Notified:   Patient Notified:

## 2024-10-15 ENCOUNTER — TELEPHONE (OUTPATIENT)
Dept: ENDOCRINOLOGY | Facility: CLINIC | Age: 54
End: 2024-10-15
Payer: COMMERCIAL

## 2024-10-15 NOTE — TELEPHONE ENCOUNTER
MTM referral from: [facility] visit (referral by provider)    MTM referral outreach attempt #2 on October 15, 2024 at 9:36 AM      Outcome: Patient not reachable after several attempts, routed to Pharmacist Team/Provider as an FYI    Use HBC for the carrier/Plan on the flowsheet      MTM Practitioner please send patient letter    Deondre Zapata MT

## 2024-10-17 ENCOUNTER — TELEPHONE (OUTPATIENT)
Dept: FAMILY MEDICINE | Facility: OTHER | Age: 54
End: 2024-10-17

## 2024-10-17 NOTE — TELEPHONE ENCOUNTER
8:40 AM    Reason for Call: Phone Call    Description: PT called saying she had a missed call from [facility] nurse. Not seeing any call notes regarding any calls placed. Please call PT back. PT said her back is cramping up really bad, standing sitting laying nothing was giving relief.     Was an appointment offered for this call? No  If yes : Appointment type              Date    Preferred method for responding to this message: Telephone Call  What is your phone number ? [phone]    If we cannot reach you directly, may we leave a detailed response at the number you provided? Yes    Can this message wait until your PCP/provider returns, if available today? Not applicable    Sylvester Blanco

## 2024-10-18 ENCOUNTER — TELEPHONE (OUTPATIENT)
Dept: ENDOCRINOLOGY | Facility: CLINIC | Age: 54
End: 2024-10-18
Payer: COMMERCIAL

## 2024-10-18 NOTE — TELEPHONE ENCOUNTER
MTM Referral outreach attempt #3 was made on 10/18/2024.    Outcome: Called patient since they do not have an active Nodeablet account. Patient did not answer. A voicemail was left explaining more in-depth what MTM is and how we can best support them. Additionally, I provide my direct line so that the patient can call me directly for help with scheduling.

## 2024-10-18 NOTE — TELEPHONE ENCOUNTER
General Call    Contacts       Contact Date/Time Type Contact Phone/Fax    10/18/2024 02:09 PM CDT Phone (Incoming) Liza Goins (Self) [phone] (M)          Reason for Call:      Pt needs to have prior auth started for WEGOVY  Needs to change pharm as pharm of record is out  Change to:  [facility].        Okay to leave a detailed message?: Yes at Cell number on file:    Telephone Information:   Mobile [phone]

## 2024-10-18 NOTE — TELEPHONE ENCOUNTER
I did not call.   Back pain- Recommend restarting tizanadine. If they need a refill let me know. Heat. May try lidocaine gel/patch.  Ibuprofen and acetaminophen OTC as well.   If not improving over the weekend can schedule an appointment. If severe, consider urgent care/ER.   MARTY Markham, CNP

## 2024-10-21 ENCOUNTER — TELEPHONE (OUTPATIENT)
Dept: FAMILY MEDICINE | Facility: OTHER | Age: 54
End: 2024-10-21

## 2024-10-21 NOTE — TELEPHONE ENCOUNTER
Telephone message received on [facility] Care Team Line from patient stating that injection people told her to let clinic know she needs an injection to her right SI joint.    Telephone returned to patient.  No answer, message left to return call to clinic.

## 2024-10-21 NOTE — TELEPHONE ENCOUNTER
Talked to patient had si injection about 3 weeks ago with no relief would like refill on tizanidine to [facility]

## 2024-10-21 NOTE — TELEPHONE ENCOUNTER
Left message to call back talked to patient she wants another injection on right side not sure if she can get another one since just had one please advise

## 2024-10-22 ENCOUNTER — TELEPHONE (OUTPATIENT)
Dept: ENDOCRINOLOGY | Facility: CLINIC | Age: 54
End: 2024-10-22
Payer: COMMERCIAL

## 2024-10-22 NOTE — TELEPHONE ENCOUNTER
General Call    Contacts       Contact Date/Time Type Contact Phone/Fax    10/22/2024 12:26 PM CDT Phone (Incoming) Liza Goins (Self) [phone] (M)          Reason for Call:  please call pt, curious about status of WEGOVY  prior auth        Okay to leave a detailed message?: Yes at Cell number on file:    Telephone Information:   Mobile [phone]

## 2024-10-24 NOTE — TELEPHONE ENCOUNTER
Telephone call placed to patient to provide update from Yanet Britton CNP.  No answer, message left to return call to clinic [phone]

## 2024-10-28 NOTE — TELEPHONE ENCOUNTER
Patient returned phone call to clinic and was provided response from Yanet Britton CNP to follow up in clinic.      Patient states that she saw Yanet Britton CNP on 7/1/24 for her pain and was referred for injections in which she had at [facility] on 9/18/24 which did not relieve much of her pain as she reports most of her pain is in her SI joint.  Patient is requesting if Yanet Britton CNP could order these injections without a follow up appointment with her.  Will route patient request and follow up with patient with Yanet's response.

## 2024-10-28 NOTE — TELEPHONE ENCOUNTER
Second attempt to reach patient via telephone call. No answer, message left to return call to clinic if needing further assistance.

## 2024-10-28 NOTE — TELEPHONE ENCOUNTER
Please reach out to radiology and see if this is something they feel comfortable doing. I do not believe we have a dedicated MRI of this are but the lumbar MRI may be enough for them. I would need the order number if they are willing to do this.   Yanet Britton, APRN, CNP

## 2024-11-07 ENCOUNTER — TELEPHONE (OUTPATIENT)
Dept: ENDOCRINOLOGY | Facility: CLINIC | Age: 54
End: 2024-11-07
Payer: COMMERCIAL

## 2024-11-07 NOTE — TELEPHONE ENCOUNTER
Left Voicemail (1st Attempt) and Sent Naterahart (1st Attempt) for the patient to call back and schedule the following:    Appointment type: Return Weight Management  Appointment mode: In Person or Virtual Visit  Provider: Nena Kolb PA-C  Return date: Approx. 1/11/25  Specialty phone number: [phone]

## 2024-11-08 DIAGNOSIS — G89.29 CHRONIC SI JOINT PAIN: Primary | ICD-10-CM

## 2024-11-08 DIAGNOSIS — M53.3 CHRONIC SI JOINT PAIN: Primary | ICD-10-CM

## 2024-11-08 NOTE — TELEPHONE ENCOUNTER
Telephone call placed to IR to inquire if they are willing to do injections with current imaging.  No answer, message left to return call to *[phone].

## 2024-11-08 NOTE — TELEPHONE ENCOUNTER
Telephone call placed to patient to notify of order placed for injections and diagnostic imaging department will be calling to schedule appointment.  No further questions.

## 2024-11-08 NOTE — PROGRESS NOTES
Most recent IR follow up information reviewed. Pain location persist  bilaterally per that call.   1. Chronic SI joint pain (Primary)  - XR Sacroiliac Joint Inj Bilateral; Future  Yanet Britton, MARTY, CNP

## 2024-11-08 NOTE — TELEPHONE ENCOUNTER
Spoke with Kadie in Diagnostic Imaging update received SI Joint Injections can be done without MRI - Hip and SI Joint Injections do not require MRI to be done prior to injections.     SI Joint Code:     [id number]- RT SI  [id number]- LT SI  [id number]- B/L SI

## 2024-11-12 ENCOUNTER — HOSPITAL ENCOUNTER (OUTPATIENT)
Facility: HOSPITAL | Age: 54
End: 2024-11-12
Attending: RADIOLOGY | Admitting: RADIOLOGY
Payer: COMMERCIAL

## 2024-11-18 ENCOUNTER — TELEPHONE (OUTPATIENT)
Dept: INTERVENTIONAL RADIOLOGY/VASCULAR | Facility: HOSPITAL | Age: 54
End: 2024-11-18

## 2024-11-18 NOTE — TELEPHONE ENCOUNTER
Left a message to remind patient of their angelo on 11/20. Also reminded patient to not take any antibiotics, steroids, or immunizations two weeks before and after this appt. And they need a .

## 2024-11-20 ENCOUNTER — HOSPITAL ENCOUNTER (OUTPATIENT)
Dept: INTERVENTIONAL RADIOLOGY/VASCULAR | Facility: HOSPITAL | Age: 54
Discharge: HOME | End: 2024-11-20
Attending: NURSE PRACTITIONER
Payer: COMMERCIAL

## 2024-11-20 DIAGNOSIS — G89.29 CHRONIC SI JOINT PAIN: ICD-10-CM

## 2024-11-20 DIAGNOSIS — M53.3 CHRONIC SI JOINT PAIN: ICD-10-CM

## 2024-11-20 PROCEDURE — 255N000002 HC RX 255 OP 636: Performed by: RADIOLOGY

## 2024-11-20 PROCEDURE — 250N000009 HC RX 250: Performed by: RADIOLOGY

## 2024-11-20 PROCEDURE — 27096 INJECT SACROILIAC JOINT: CPT | Mod: RT

## 2024-11-20 PROCEDURE — 250N000011 HC RX IP 250 OP 636: Performed by: RADIOLOGY

## 2024-11-20 RX ORDER — LIDOCAINE HYDROCHLORIDE 10 MG/ML
10 INJECTION, SOLUTION EPIDURAL; INFILTRATION; INTRACAUDAL; PERINEURAL ONCE
Status: COMPLETED | OUTPATIENT
Start: 2024-11-20 | End: 2024-11-20

## 2024-11-20 RX ORDER — TRIAMCINOLONE ACETONIDE 40 MG/ML
40 INJECTION, SUSPENSION INTRA-ARTICULAR; INTRAMUSCULAR ONCE
Status: COMPLETED | OUTPATIENT
Start: 2024-11-20 | End: 2024-11-20

## 2024-11-20 RX ORDER — IOPAMIDOL 612 MG/ML
30 INJECTION, SOLUTION INTRAVASCULAR ONCE
Status: COMPLETED | OUTPATIENT
Start: 2024-11-20 | End: 2024-11-20

## 2024-11-20 RX ADMIN — IOPAMIDOL 2 ML: 612 INJECTION, SOLUTION INTRAVENOUS at 10:48

## 2024-11-20 RX ADMIN — TRIAMCINOLONE ACETONIDE 40 MG: 40 INJECTION, SUSPENSION INTRA-ARTICULAR; INTRAMUSCULAR at 10:48

## 2024-11-20 RX ADMIN — LIDOCAINE HYDROCHLORIDE 2 ML: 10 INJECTION, SOLUTION EPIDURAL; INFILTRATION; INTRACAUDAL; PERINEURAL at 10:47

## 2024-11-20 NOTE — DISCHARGE INSTRUCTIONS
Discharge Instructions for an Radiology Injection    Home number on file [phone] (home)  Is it ok to leave a message at this number(s) Yes    Dr. Ackerman completed your procedure on 11/20/2024.    Current Pain Level (0-10 Scale): 4/10  Post Pain Level (0-10):  1/10      Activity Level:     Do not do any heavy activity or exercise for 24 hours.   Hip Injections:  Do not drive for 4 hours after your injection.  Diet:   Return to your normal diet.  Medications:   If you have stopped taking your Aspirin, Coumadin/Warfarin, Ibuprofen, or any   other blood thinner for this procedure you may resume in the morning unless   your primary care provider has given you other instructions.    Diabetics may see an increase in blood sugar after steroid injections. If you are concerned about your blood sugar, please contact your family doctor.    Site Care:  Remove the bandage and bathe or shower the morning after the procedure.      Please allow two weeks to experience improvement in your pain.  If you have any further issues, please contact your provider.  Call your Primary Care Provider if you have the following (if your primary care provider is not available please seek emergency care):   Nausea with vomiting   Severe headache   Drowsiness or confusion   Redness or drainage at the injection or puncture site   Temperature over 101 degrees F   Other concerns   Increasing joint pain

## 2024-12-02 ENCOUNTER — TELEPHONE (OUTPATIENT)
Dept: INTERVENTIONAL RADIOLOGY/VASCULAR | Facility: HOSPITAL | Age: 54
End: 2024-12-02

## 2024-12-02 NOTE — TELEPHONE ENCOUNTER
NO VOICEMAIL UP    Left a message to remind patient of their angelo on 12/5. Also reminded patient to not take any antibiotics, steroids, or immunizations two weeks before and after this appt. And they need a .

## 2024-12-03 ENCOUNTER — OFFICE VISIT (OUTPATIENT)
Dept: FAMILY MEDICINE | Facility: OTHER | Age: 54
End: 2024-12-03
Attending: NURSE PRACTITIONER
Payer: COMMERCIAL

## 2024-12-03 VITALS
DIASTOLIC BLOOD PRESSURE: 80 MMHG | TEMPERATURE: 97.6 F | HEART RATE: 70 BPM | OXYGEN SATURATION: 97 % | SYSTOLIC BLOOD PRESSURE: 148 MMHG | WEIGHT: 279.3 LBS | RESPIRATION RATE: 18 BRPM | BODY MASS INDEX: 45.77 KG/M2

## 2024-12-03 DIAGNOSIS — Z20.818 STREPTOCOCCUS EXPOSURE: ICD-10-CM

## 2024-12-03 DIAGNOSIS — H61.813 EXOSTOSIS OF BOTH EXTERNAL AUDITORY CANALS: ICD-10-CM

## 2024-12-03 DIAGNOSIS — R05.1 ACUTE COUGH: ICD-10-CM

## 2024-12-03 DIAGNOSIS — H66.001 NON-RECURRENT ACUTE SUPPURATIVE OTITIS MEDIA OF RIGHT EAR WITHOUT SPONTANEOUS RUPTURE OF TYMPANIC MEMBRANE: Primary | ICD-10-CM

## 2024-12-03 PROCEDURE — G0463 HOSPITAL OUTPT CLINIC VISIT: HCPCS

## 2024-12-03 RX ORDER — BENZONATATE 100 MG/1
100-200 CAPSULE ORAL 3 TIMES DAILY PRN
Qty: 90 CAPSULE | Refills: 0 | Status: SHIPPED | OUTPATIENT
Start: 2024-12-03

## 2024-12-03 ASSESSMENT — PATIENT HEALTH QUESTIONNAIRE - PHQ9
SUM OF ALL RESPONSES TO PHQ QUESTIONS 1-9: 13
10. IF YOU CHECKED OFF ANY PROBLEMS, HOW DIFFICULT HAVE THESE PROBLEMS MADE IT FOR YOU TO DO YOUR WORK, TAKE CARE OF THINGS AT HOME, OR GET ALONG WITH OTHER PEOPLE: VERY DIFFICULT
SUM OF ALL RESPONSES TO PHQ QUESTIONS 1-9: 13

## 2024-12-03 ASSESSMENT — PAIN SCALES - GENERAL: PAINLEVEL_OUTOF10: MILD PAIN (2)

## 2024-12-03 NOTE — PROGRESS NOTES
"  Assessment & Plan     Non-recurrent acute suppurative otitis media of right ear without spontaneous rupture of tympanic membrane  - amoxicillin-clavulanate (AUGMENTIN) 875-125 MG tablet; Take 1 tablet by mouth 2 times daily for 7 days.    Exostosis of both external auditory canals  Noted. No infection of the canal    Streptococcus exposure  - amoxicillin-clavulanate (AUGMENTIN) 875-125 MG tablet; Take 1 tablet by mouth 2 times daily for 7 days.    Acute cough  Likely viral. However, strep throat can also present with a cough. Since you have an ear infection of the right ear, I am covering you for strep without needing to test. The antibiotic will not treat any viral related symptoms and so stay well hydrated, rest as you feel the need, and consider a cool mist humidifier. Okay to use mucinex if you find that helpful.   - benzonatate (TESSALON) 100 MG capsule; Take 1-2 capsules (100-200 mg) by mouth 3 times daily as needed for cough.          BMI  Estimated body mass index is 45.77 kg/m  as calculated from the following:    Height as of 10/11/24: 1.664 m (5' 5.5\").    Weight as of this encounter: 126.7 kg (279 lb 4.8 oz).             No follow-ups on file.    Subjective   Liza is a 54 year old, presenting for the following health issues:  URI    HPI     Acute Illness  Acute illness concerns: URI  Onset/Duration: 6 days. Symptoms slowly crept up. Started with chest congestion.   Symptoms:  Fever: No  Chills/Sweats: YES - started yesterday.   Headache (location?): YES - :\"felt like head was going to blow off this AM\"  Sinus Pressure: YES -  points to maxillary. Lots of ear pain   Conjunctivitis:  YES - just watery and blurry.   Ear Pain: YES: bilateral  Rhinorrhea: YES  Congestion: YES  Sore Throat: No  Cough: YES-productive of clear sputum  Wheeze: No  Decreased Appetite: No  Nausea: No  Vomiting: No  Diarrhea: No  Dysuria/Freq.: No  Dysuria or Hematuria: No  Fatigue/Achiness: YES  Sick/Strep Exposure: YES- but " over a month ago  Therapies tried and outcome: cough drops, nsaids            Objective    BP (!) 148/80 (BP Location: Left arm, Patient Position: Sitting, Cuff Size: Adult Regular)   Pulse 70   Temp 97.6  F (36.4  C) (Tympanic)   Resp 18   Wt 126.7 kg (279 lb 4.8 oz)   SpO2 97%   BMI 45.77 kg/m    Body mass index is 45.77 kg/m .      Physical Exam               Signed Electronically by: Yanet Britton, CNP

## 2024-12-04 ENCOUNTER — TELEPHONE (OUTPATIENT)
Dept: FAMILY MEDICINE | Facility: OTHER | Age: 54
End: 2024-12-04

## 2024-12-04 NOTE — TELEPHONE ENCOUNTER
8:46 AM    Reason for Call: Phone Call    Description: pt called and saw NP Tobi yesterday, and now pt is vomiting and wondering if can something else for the cough please call pt    Was an appointment offered for this call? No  If yes : Appointment type              Date    Preferred method for responding to this message: Telephone Call  What is your phone number ? [phone]     If we cannot reach you directly, may we leave a detailed response at the number you provided? Yes    Can this message wait until your PCP/provider returns, if available today? Ban Reid

## 2024-12-05 NOTE — TELEPHONE ENCOUNTER
May try OTC cough such as delsym (dextromethorphan). With current medications, would advise against prescription codeine containing products.

## 2025-01-14 ENCOUNTER — VIRTUAL VISIT (OUTPATIENT)
Dept: ENDOCRINOLOGY | Facility: CLINIC | Age: 55
End: 2025-01-14
Payer: COMMERCIAL

## 2025-01-14 DIAGNOSIS — E66.813 CLASS 3 SEVERE OBESITY WITH SERIOUS COMORBIDITY AND BODY MASS INDEX (BMI) OF 45.0 TO 49.9 IN ADULT, UNSPECIFIED OBESITY TYPE (H): Primary | ICD-10-CM

## 2025-01-14 DIAGNOSIS — E66.01 CLASS 3 SEVERE OBESITY WITH SERIOUS COMORBIDITY AND BODY MASS INDEX (BMI) OF 45.0 TO 49.9 IN ADULT, UNSPECIFIED OBESITY TYPE (H): Primary | ICD-10-CM

## 2025-01-14 RX ORDER — SEMAGLUTIDE 1 MG/.5ML
1 INJECTION, SOLUTION SUBCUTANEOUS WEEKLY
Qty: 2 ML | Refills: 3 | Status: SHIPPED | OUTPATIENT
Start: 2025-01-14

## 2025-01-14 ASSESSMENT — PAIN SCALES - GENERAL: PAINLEVEL_OUTOF10: MODERATE PAIN (5)

## 2025-01-14 NOTE — PROGRESS NOTES
Virtual Visit Details    Type of service:  Video Visit   Video Start Time:  8:04AM  Video End Time: 8:27AM    Originating Location (pt. Location): Home    Distant Location (provider location):  Off-site  Platform used for Video Visit: [facility] Medical Weight Management Note     Liza Goins  MRN:  [id number]  :  1970  ARABELLA:  2025    Dear Yanet Britton, CNP,    I had the pleasure of seeing your patient Liza Goins. She is a 54 year old female who I am continuing to see for treatment of obesity related to:        10/11/2024    11:02 AM   --   I have the following health issues associated with obesity Sleep Apnea    Polycystic Ovarian Syndrome    Asthma   I have the following symptoms associated with obesity Knee Pain    Depression    Lower Extremity Swelling    Back Pain    Fatigue    Hip Pain       Assessment & Plan   Problem List Items Addressed This Visit       Class 3 severe obesity with serious comorbidity and body mass index (BMI) of 45.0 to 49.9 in adult, unspecified obesity type (H) - Primary    Relevant Medications    Semaglutide-Weight Management (WEGOVY) 1 MG/0.5ML pen      Increase Wegovy 1.0mg once weekly. Reach out if dose increase is needed prior to next visit.   Food goal - try having lean cuisine instead of pizza bagels at work  Activity goal - walking 2xweek for 20min   Nena Del Toro in 3-4 months     INTERVAL HISTORY:  Weight gain through 3 pregnancies, hard to lose weight postpartum. Weight gain through breast cancer treatment. Highest weight in life of 300lbs. Able to lose 20lbs over the past year, but was not sustainable.   Previously seen by Dr. Giraldo 2023 for MWM. Started phentermine. Has been lost to follow since.   First seen by me on 10/2024. Started Wegovy       Does think she has been losing weight. Clothes are fitting looser.     Anti-obesity medication history    Current:   Wegovy 0.5mg - has been on this dose for the past 3 months. Last injection was  last week. No side effects. At first was helpful with hunger and fullness - but doesn't feel like it has been helpful any more.   GERD - symptoms has improved with starting Wegovy.     Past/Failed/contraindicated:   Phentermine previously trialed. No side effects. Was not helpful with weight loss     Recent diet changes: Decrease in portion sizes. Eating 2 meals a day, with some snacks. Hunger is better controlled, but is still hungry. Drinking 48oz of sparkling water daily. Has not had a pop since starting Wegovy   Breakfast - skip   Lunch - buys food at work - pizza bagels, beef sticks or left overs   Dinner - [employer], pork loin, steak     Recent exercise/activity changes: has been trying to be more active. Wants to join the gym or start walking.     Recent stressors: Stable     Recent sleep changes: Stable - feels like she is sleeping better.         CURRENT WEIGHT:   0 lbs 0 oz - does not have an updated weight today     Initial Weight (lbs): 280 lbs  Last Visits Weight: 127 kg (280 lb)          Wt Readings from Last 5 Encounters:   12/03/24 126.7 kg (279 lb 4.8 oz)   10/11/24 127 kg (280 lb)   07/01/24 127.7 kg (281 lb 9.6 oz)   06/05/23 125.6 kg (277 lb)   05/12/23 125.6 kg (277 lb)             1/14/2025     7:53 AM   Changes and Difficulties   I have made the following changes to my diet since my last visit: eating less   With regards to my diet, I am still struggling with: eating past being satisfied   I have made the following changes to my activity/exercise since my last visit: none   With regards to my activity/exercise, I am still struggling with: finding more time         MEDICATIONS:   Current Outpatient Medications   Medication Sig Dispense Refill    Semaglutide-Weight Management (WEGOVY) 1 MG/0.5ML pen Inject 1 mg subcutaneously once a week. 2 mL 3    albuterol (PROAIR HFA) 108 (90 Base) MCG/ACT inhaler Inhale 2 puffs into the lungs every 4 hours as needed for shortness of breath 8.5 g 0     albuterol (PROAIR HFA/PROVENTIL HFA/VENTOLIN HFA) 108 (90 Base) MCG/ACT inhaler Inhale 1-2 puffs into the lungs      alum & mag hydroxide-simethicone (MAALOX MAX) 400-400-40 MG/5ML SUSP suspension Take 30 mLs by mouth every 4 hours as needed for indigestion or heartburn 355 mL 0    benzonatate (TESSALON) 100 MG capsule Take 1-2 capsules (100-200 mg) by mouth 3 times daily as needed for cough. 90 capsule 0    clindamycin (CLEOCIN T) 1 % external lotion Apply topically daily To affected areas of face 60 mL 1    cyclobenzaprine (FLEXERIL) 10 MG tablet Take 1 tablet (10 mg) by mouth 2 times daily as needed for muscle spasms (Patient not taking: Reported on 12/3/2024) 10 tablet 0    diclofenac (VOLTAREN) 50 MG EC tablet Take 1 tablet (50 mg) by mouth 2 times daily 180 tablet 0    famotidine (PEPCID) 20 MG tablet Take 1 tablet (20 mg) by mouth 2 times daily as needed (REFLUX). 180 tablet 1    ipratropium - albuterol 0.5 mg/2.5 mg/3 mL (DUONEB) 0.5-2.5 (3) MG/3ML neb solution Take 1 vial (3 mLs) by nebulization every 6 hours as needed for shortness of breath, wheezing or cough 75 mL 1    ketoconazole (NIZORAL) 2 % external shampoo Apply topically daily as needed for itching or irritation 100 mL 0    lisinopril (ZESTRIL) 10 MG tablet Take 1 tablet (10 mg) by mouth daily 90 tablet 0    loratadine (CLARITIN) 10 MG tablet Take 1 tablet (10 mg) by mouth daily 90 tablet 2    meloxicam (MOBIC) 15 MG tablet       mometasone (ELOCON) 0.1 % external cream Apply a thin film of cream to the affected ear canal (s) twice a day for 2 weeks, then use sparingly as needed only. 45 g 3    ondansetron (ZOFRAN ODT) 4 MG ODT tab Take 1 tablet (4 mg) by mouth every 8 hours as needed for nausea 15 tablet 0    Semaglutide-Weight Management (WEGOVY) 0.25 MG/0.5ML pen Inject 0.25 mg subcutaneously once a week. For 4 weeks 2 mL 0    Semaglutide-Weight Management (WEGOVY) 0.5 MG/0.5ML pen Inject 0.5 mg subcutaneously once a week. After completing 4  weeks of 0.25mg dose 2 mL 2    spacer (OPTICHAMBER DON) holding chamber Use with inhaler 1 each 0    tiZANidine (ZANAFLEX) 4 MG tablet Take 0.5-1 tablets (2-4 mg) by mouth 3 times daily 90 tablet 0    trimethoprim-polymyxin b (POLYTRIM) 05132-4.1 UNIT/ML-% ophthalmic solution Place 1-2 drops into the right eye every 4 hours 10 mL 0    vitamin D3 (CHOLECALCIFEROL) 50 mcg (2000 units) tablet Take 1 tablet (50 mcg) by mouth daily 90 tablet 3           1/14/2025     7:53 AM   Weight Loss Medication History Reviewed With Patient   Which weight loss medications are you currently taking on a regular basis? None   If you are not taking a weight loss medication that was prescribed to you, please indicate why: Other   Are you having any side effects from the weight loss medication that we have prescribed you? No       Objective    There were no vitals taken for this visit.  Vitals - Patient Reported  Pain Score: Moderate Pain (5)  Pain Loc: Mid Back      Vitals:  No vitals were obtained today due to virtual visit.      PHYSICAL EXAM:  GENERAL: alert and no distress  EYES: Eyes grossly normal to inspection.  No discharge or erythema, or obvious scleral/conjunctival abnormalities.  RESP: No audible wheeze, cough, or visible cyanosis.    SKIN: Visible skin clear. No significant rash, abnormal pigmentation or lesions.  NEURO: Cranial nerves grossly intact.  Mentation and speech appropriate for age.  PSYCH: Appropriate affect, tone, and pace of words        Sincerely,    Nena Kolb PA-C      33 minutes spent by me on the date of the encounter doing chart review, history and exam, documentation and further activities per the note    The longitudinal plan of care for the diagnosis(es)/condition(s) as documented were addressed during this visit. Due to the added complexity in care, I will continue to support Liza in the subsequent management and with ongoing continuity of care.

## 2025-01-14 NOTE — PATIENT INSTRUCTIONS
Visit Plan:     Increase Wegovy 1.0mg once weekly. Reach out if dose increase is needed prior to next visit.   Food goal - try having lean cuisine instead of pizza bagels at work  Activity goal - walking 2xweek for 20min   Nena Del Toro in 3-4 months

## 2025-01-14 NOTE — NURSING NOTE
Is the patient currently in the state of [place]? YES    Location: home    Visit mode:VIDEO    If the visit is dropped, the patient can be reconnected by: VIDEO VISIT: Text to cell phone:   Telephone Information:   Mobile [phone]    and VIDEO VISIT: Send to e-mail at: [email or web address]    Will anyone else be joining the visit? NO  (If patient encounters technical issues they should call [phone])    Are changes needed to the allergy or medication list? No    Are refills needed on medications prescribed by this physician? NO    Reason for visit: ADRIEN Peacock, Virtual Visit Facilitator    QNR Status: completed

## 2025-01-14 NOTE — LETTER
2025       RE: Liza Goins  [address]  [facility] [address]     Dear Colleague,    Thank you for referring your patient, Liza Goins, to the [facility] [place] [facility]. Please see a copy of my visit note below.    Virtual Visit Details    Type of service:  Video Visit   Video Start Time:  8:04AM  Video End Time: 8:27AM    Originating Location (pt. Location): Home    Distant Location (provider location):  Off-site  Platform used for Video Visit: [facility] Medical Weight Management Note     Liza Goins  MRN:  [id number]  :  1970  ARABELLA:  2025    Dear Yanet Britton CNP,    I had the pleasure of seeing your patient Liza Goins. She is a 54 year old female who I am continuing to see for treatment of obesity related to:        10/11/2024    11:02 AM   --   I have the following health issues associated with obesity Sleep Apnea    Polycystic Ovarian Syndrome    Asthma   I have the following symptoms associated with obesity Knee Pain    Depression    Lower Extremity Swelling    Back Pain    Fatigue    Hip Pain       Assessment & Plan  Problem List Items Addressed This Visit       Class 3 severe obesity with serious comorbidity and body mass index (BMI) of 45.0 to 49.9 in adult, unspecified obesity type (H) - Primary    Relevant Medications    Semaglutide-Weight Management (WEGOVY) 1 MG/0.5ML pen      Increase Wegovy 1.0mg once weekly. Reach out if dose increase is needed prior to next visit.   Food goal - try having lean cuisine instead of pizza bagels at work  Activity goal - walking 2xweek for 20min   Nena Del Toro in 3-4 months     INTERVAL HISTORY:  Weight gain through 3 pregnancies, hard to lose weight postpartum. Weight gain through breast cancer treatment. Highest weight in life of 300lbs. Able to lose 20lbs over the past year, but was not sustainable.   Previously seen by Dr. Giraldo  6/5/2023 for MWM. Started phentermine. Has been lost to follow since.   First seen by me on 10/2024. Started Wegovy       Does think she has been losing weight. Clothes are fitting looser.     Anti-obesity medication history    Current:   Wegovy 0.5mg - has been on this dose for the past 3 months. Last injection was last week. No side effects. At first was helpful with hunger and fullness - but doesn't feel like it has been helpful any more.   GERD - symptoms has improved with starting Wegovy.     Past/Failed/contraindicated:   Phentermine previously trialed. No side effects. Was not helpful with weight loss     Recent diet changes: Decrease in portion sizes. Eating 2 meals a day, with some snacks. Hunger is better controlled, but is still hungry. Drinking 48oz of sparkling water daily. Has not had a pop since starting Wegovy   Breakfast - skip   Lunch - buys food at work - pizza bagels, beef sticks or left overs   Dinner - [employer], pork loin, steak     Recent exercise/activity changes: has been trying to be more active. Wants to join the gym or start walking.     Recent stressors: Stable     Recent sleep changes: Stable - feels like she is sleeping better.         CURRENT WEIGHT:   0 lbs 0 oz - does not have an updated weight today     Initial Weight (lbs): 280 lbs  Last Visits Weight: 127 kg (280 lb)          Wt Readings from Last 5 Encounters:   12/03/24 126.7 kg (279 lb 4.8 oz)   10/11/24 127 kg (280 lb)   07/01/24 127.7 kg (281 lb 9.6 oz)   06/05/23 125.6 kg (277 lb)   05/12/23 125.6 kg (277 lb)             1/14/2025     7:53 AM   Changes and Difficulties   I have made the following changes to my diet since my last visit: eating less   With regards to my diet, I am still struggling with: eating past being satisfied   I have made the following changes to my activity/exercise since my last visit: none   With regards to my activity/exercise, I am still struggling with: finding more time         MEDICATIONS:    Current Outpatient Medications   Medication Sig Dispense Refill     Semaglutide-Weight Management (WEGOVY) 1 MG/0.5ML pen Inject 1 mg subcutaneously once a week. 2 mL 3     albuterol (PROAIR HFA) 108 (90 Base) MCG/ACT inhaler Inhale 2 puffs into the lungs every 4 hours as needed for shortness of breath 8.5 g 0     albuterol (PROAIR HFA/PROVENTIL HFA/VENTOLIN HFA) 108 (90 Base) MCG/ACT inhaler Inhale 1-2 puffs into the lungs       alum & mag hydroxide-simethicone (MAALOX MAX) 400-400-40 MG/5ML SUSP suspension Take 30 mLs by mouth every 4 hours as needed for indigestion or heartburn 355 mL 0     benzonatate (TESSALON) 100 MG capsule Take 1-2 capsules (100-200 mg) by mouth 3 times daily as needed for cough. 90 capsule 0     clindamycin (CLEOCIN T) 1 % external lotion Apply topically daily To affected areas of face 60 mL 1     cyclobenzaprine (FLEXERIL) 10 MG tablet Take 1 tablet (10 mg) by mouth 2 times daily as needed for muscle spasms (Patient not taking: Reported on 12/3/2024) 10 tablet 0     diclofenac (VOLTAREN) 50 MG EC tablet Take 1 tablet (50 mg) by mouth 2 times daily 180 tablet 0     famotidine (PEPCID) 20 MG tablet Take 1 tablet (20 mg) by mouth 2 times daily as needed (REFLUX). 180 tablet 1     ipratropium - albuterol 0.5 mg/2.5 mg/3 mL (DUONEB) 0.5-2.5 (3) MG/3ML neb solution Take 1 vial (3 mLs) by nebulization every 6 hours as needed for shortness of breath, wheezing or cough 75 mL 1     ketoconazole (NIZORAL) 2 % external shampoo Apply topically daily as needed for itching or irritation 100 mL 0     lisinopril (ZESTRIL) 10 MG tablet Take 1 tablet (10 mg) by mouth daily 90 tablet 0     loratadine (CLARITIN) 10 MG tablet Take 1 tablet (10 mg) by mouth daily 90 tablet 2     meloxicam (MOBIC) 15 MG tablet        mometasone (ELOCON) 0.1 % external cream Apply a thin film of cream to the affected ear canal (s) twice a day for 2 weeks, then use sparingly as needed only. 45 g 3     ondansetron (ZOFRAN ODT) 4  MG ODT tab Take 1 tablet (4 mg) by mouth every 8 hours as needed for nausea 15 tablet 0     Semaglutide-Weight Management (WEGOVY) 0.25 MG/0.5ML pen Inject 0.25 mg subcutaneously once a week. For 4 weeks 2 mL 0     Semaglutide-Weight Management (WEGOVY) 0.5 MG/0.5ML pen Inject 0.5 mg subcutaneously once a week. After completing 4 weeks of 0.25mg dose 2 mL 2     spacer (OPTICHAMBER DON) holding chamber Use with inhaler 1 each 0     tiZANidine (ZANAFLEX) 4 MG tablet Take 0.5-1 tablets (2-4 mg) by mouth 3 times daily 90 tablet 0     trimethoprim-polymyxin b (POLYTRIM) 91475-0.1 UNIT/ML-% ophthalmic solution Place 1-2 drops into the right eye every 4 hours 10 mL 0     vitamin D3 (CHOLECALCIFEROL) 50 mcg (2000 units) tablet Take 1 tablet (50 mcg) by mouth daily 90 tablet 3           1/14/2025     7:53 AM   Weight Loss Medication History Reviewed With Patient   Which weight loss medications are you currently taking on a regular basis? None   If you are not taking a weight loss medication that was prescribed to you, please indicate why: Other   Are you having any side effects from the weight loss medication that we have prescribed you? No       Objective   There were no vitals taken for this visit.  Vitals - Patient Reported  Pain Score: Moderate Pain (5)  Pain Loc: Mid Back      Vitals:  No vitals were obtained today due to virtual visit.      PHYSICAL EXAM:  GENERAL: alert and no distress  EYES: Eyes grossly normal to inspection.  No discharge or erythema, or obvious scleral/conjunctival abnormalities.  RESP: No audible wheeze, cough, or visible cyanosis.    SKIN: Visible skin clear. No significant rash, abnormal pigmentation or lesions.  NEURO: Cranial nerves grossly intact.  Mentation and speech appropriate for age.  PSYCH: Appropriate affect, tone, and pace of words        Sincerely,    Nena Kolb PA-C      33 minutes spent by me on the date of the encounter doing chart review, history and exam,  documentation and further activities per the note    The longitudinal plan of care for the diagnosis(es)/condition(s) as documented were addressed during this visit. Due to the added complexity in care, I will continue to support Liza in the subsequent management and with ongoing continuity of care.      Again, thank you for allowing me to participate in the care of your patient.      Sincerely,    Nena Kolb PA-C

## 2025-02-10 ENCOUNTER — APPOINTMENT (OUTPATIENT)
Dept: CT IMAGING | Facility: HOSPITAL | Age: 55
End: 2025-02-10
Attending: EMERGENCY MEDICINE
Payer: COMMERCIAL

## 2025-02-10 ENCOUNTER — NURSE TRIAGE (OUTPATIENT)
Dept: FAMILY MEDICINE | Facility: OTHER | Age: 55
End: 2025-02-10

## 2025-02-10 ENCOUNTER — HOSPITAL ENCOUNTER (EMERGENCY)
Facility: HOSPITAL | Age: 55
Discharge: HOME | End: 2025-02-10
Attending: EMERGENCY MEDICINE
Payer: COMMERCIAL

## 2025-02-10 VITALS
HEIGHT: 65 IN | DIASTOLIC BLOOD PRESSURE: 95 MMHG | OXYGEN SATURATION: 94 % | HEART RATE: 81 BPM | WEIGHT: 276.79 LBS | TEMPERATURE: 98.4 F | RESPIRATION RATE: 16 BRPM | BODY MASS INDEX: 46.12 KG/M2 | SYSTOLIC BLOOD PRESSURE: 167 MMHG

## 2025-02-10 DIAGNOSIS — R10.11 ABDOMINAL PAIN, RIGHT UPPER QUADRANT: ICD-10-CM

## 2025-02-10 DIAGNOSIS — M79.18 MUSCULOSKELETAL PAIN: ICD-10-CM

## 2025-02-10 LAB
ALBUMIN SERPL BCG-MCNC: 4.4 G/DL (ref 3.5–5.2)
ALBUMIN UR-MCNC: NEGATIVE MG/DL
ALP SERPL-CCNC: 84 U/L (ref 40–150)
ALT SERPL W P-5'-P-CCNC: 18 U/L (ref 0–50)
ANION GAP SERPL CALCULATED.3IONS-SCNC: 9 MMOL/L (ref 7–15)
APPEARANCE UR: CLEAR
AST SERPL W P-5'-P-CCNC: 16 U/L (ref 0–45)
BASOPHILS # BLD AUTO: 0.1 10E3/UL (ref 0–0.2)
BASOPHILS NFR BLD AUTO: 1 %
BILIRUB SERPL-MCNC: 0.2 MG/DL
BILIRUB UR QL STRIP: NEGATIVE
BUN SERPL-MCNC: 13.5 MG/DL (ref 6–20)
CALCIUM SERPL-MCNC: 9.6 MG/DL (ref 8.8–10.4)
CHLORIDE SERPL-SCNC: 108 MMOL/L (ref 98–107)
COLOR UR AUTO: ABNORMAL
CREAT SERPL-MCNC: 0.74 MG/DL (ref 0.51–0.95)
D DIMER PPP FEU-MCNC: <0.3 UG/ML FEU (ref 0–0.5)
EGFRCR SERPLBLD CKD-EPI 2021: >90 ML/MIN/1.73M2
EOSINOPHIL # BLD AUTO: 0.2 10E3/UL (ref 0–0.7)
EOSINOPHIL NFR BLD AUTO: 2 %
ERYTHROCYTE [DISTWIDTH] IN BLOOD BY AUTOMATED COUNT: 12.2 % (ref 10–15)
GLUCOSE SERPL-MCNC: 102 MG/DL (ref 70–99)
GLUCOSE UR STRIP-MCNC: NEGATIVE MG/DL
HCO3 SERPL-SCNC: 26 MMOL/L (ref 22–29)
HCT VFR BLD AUTO: 46.5 % (ref 35–47)
HGB BLD-MCNC: 16 G/DL (ref 11.7–15.7)
HGB UR QL STRIP: NEGATIVE
HOLD SPECIMEN: NORMAL
HOLD SPECIMEN: NORMAL
IMM GRANULOCYTES # BLD: 0 10E3/UL
IMM GRANULOCYTES NFR BLD: 0 %
KETONES UR STRIP-MCNC: NEGATIVE MG/DL
LEUKOCYTE ESTERASE UR QL STRIP: NEGATIVE
LIPASE SERPL-CCNC: 62 U/L (ref 13–60)
LYMPHOCYTES # BLD AUTO: 2.5 10E3/UL (ref 0.8–5.3)
LYMPHOCYTES NFR BLD AUTO: 25 %
MCH RBC QN AUTO: 30.4 PG (ref 26.5–33)
MCHC RBC AUTO-ENTMCNC: 34.4 G/DL (ref 31.5–36.5)
MCV RBC AUTO: 88 FL (ref 78–100)
MONOCYTES # BLD AUTO: 0.6 10E3/UL (ref 0–1.3)
MONOCYTES NFR BLD AUTO: 6 %
MUCOUS THREADS #/AREA URNS LPF: PRESENT /LPF
NEUTROPHILS # BLD AUTO: 6.4 10E3/UL (ref 1.6–8.3)
NEUTROPHILS NFR BLD AUTO: 65 %
NITRATE UR QL: NEGATIVE
NRBC # BLD AUTO: 0 10E3/UL
NRBC BLD AUTO-RTO: 0 /100
PH UR STRIP: 5 [PH] (ref 4.7–8)
PLATELET # BLD AUTO: 274 10E3/UL (ref 150–450)
POTASSIUM SERPL-SCNC: 4.2 MMOL/L (ref 3.4–5.3)
PROT SERPL-MCNC: 7.7 G/DL (ref 6.4–8.3)
RBC # BLD AUTO: 5.26 10E6/UL (ref 3.8–5.2)
RBC URINE: 0 /HPF
SODIUM SERPL-SCNC: 143 MMOL/L (ref 135–145)
SP GR UR STRIP: 1 (ref 1–1.03)
SQUAMOUS EPITHELIAL: 0 /HPF
UROBILINOGEN UR STRIP-MCNC: NORMAL MG/DL
WBC # BLD AUTO: 9.9 10E3/UL (ref 4–11)
WBC URINE: 0 /HPF

## 2025-02-10 PROCEDURE — 85379 FIBRIN DEGRADATION QUANT: CPT | Performed by: EMERGENCY MEDICINE

## 2025-02-10 PROCEDURE — 99285 EMERGENCY DEPT VISIT HI MDM: CPT | Mod: 25

## 2025-02-10 PROCEDURE — 81003 URINALYSIS AUTO W/O SCOPE: CPT | Performed by: EMERGENCY MEDICINE

## 2025-02-10 PROCEDURE — 74177 CT ABD & PELVIS W/CONTRAST: CPT

## 2025-02-10 PROCEDURE — 83690 ASSAY OF LIPASE: CPT | Performed by: EMERGENCY MEDICINE

## 2025-02-10 PROCEDURE — 85049 AUTOMATED PLATELET COUNT: CPT | Performed by: EMERGENCY MEDICINE

## 2025-02-10 PROCEDURE — 36415 COLL VENOUS BLD VENIPUNCTURE: CPT | Performed by: EMERGENCY MEDICINE

## 2025-02-10 PROCEDURE — 250N000011 HC RX IP 250 OP 636: Performed by: RADIOLOGY

## 2025-02-10 PROCEDURE — 80053 COMPREHEN METABOLIC PANEL: CPT | Performed by: EMERGENCY MEDICINE

## 2025-02-10 PROCEDURE — 99284 EMERGENCY DEPT VISIT MOD MDM: CPT | Performed by: EMERGENCY MEDICINE

## 2025-02-10 PROCEDURE — 85004 AUTOMATED DIFF WBC COUNT: CPT | Performed by: EMERGENCY MEDICINE

## 2025-02-10 RX ORDER — IOPAMIDOL 755 MG/ML
135 INJECTION, SOLUTION INTRAVASCULAR ONCE
Status: COMPLETED | OUTPATIENT
Start: 2025-02-10 | End: 2025-02-10

## 2025-02-10 RX ADMIN — IOPAMIDOL 135 ML: 755 INJECTION, SOLUTION INTRAVENOUS at 11:16

## 2025-02-10 ASSESSMENT — COLUMBIA-SUICIDE SEVERITY RATING SCALE - C-SSRS
6. HAVE YOU EVER DONE ANYTHING, STARTED TO DO ANYTHING, OR PREPARED TO DO ANYTHING TO END YOUR LIFE?: NO
2. HAVE YOU ACTUALLY HAD ANY THOUGHTS OF KILLING YOURSELF IN THE PAST MONTH?: NO
1. IN THE PAST MONTH, HAVE YOU WISHED YOU WERE DEAD OR WISHED YOU COULD GO TO SLEEP AND NOT WAKE UP?: NO

## 2025-02-10 ASSESSMENT — ACTIVITIES OF DAILY LIVING (ADL)
ADLS_ACUITY_SCORE: 41

## 2025-02-10 NOTE — TELEPHONE ENCOUNTER
Symptom or reason needing to speak to RN: ABDOMINAL PAIN - SINCE 2-9-25 - PAIN LEVEL AT ABOUT A 7 WHEN IT ACTS UP    Best number to return call: [phone]     Best time to return call: BEFORE 9:30

## 2025-02-10 NOTE — ED NOTES
Patient presents with c/o right sided flank/rib/abdominal pain that started yesterday when she was lying flat on her stomach. Worse yesterday, better today. Denies any fevers or chills, denies any urinary symptoms, denies any blood in urine or stool. Denies any rib pain.

## 2025-02-10 NOTE — ED TRIAGE NOTES
Patient here for left lower abdominal pain and flank pain that started yesterday. Denies nausea, vomiting, burning or bleeding in her urine.     Triage Assessment (Adult)       Row Name 02/10/25 0947          Triage Assessment    Airway WDL WDL        Respiratory WDL    Respiratory WDL WDL        Skin Circulation/Temperature WDL    Skin Circulation/Temperature WDL WDL        Cardiac WDL    Cardiac WDL WDL        Peripheral/Neurovascular WDL    Peripheral Neurovascular WDL WDL        Cognitive/Neuro/Behavioral WDL    Cognitive/Neuro/Behavioral WDL WDL

## 2025-02-10 NOTE — ED PROVIDER NOTES
History     Chief Complaint   Patient presents with    Flank Pain    Abdominal Pain     HPI  Liza Goins is a 54 year old female who with a past history of breast cancer who presents with right upper quadrant pain.  Patient yesterday morning was laying supine and developed lower abdominal pain which then resolved migrated to right upper quadrant.  She has had some radiation of pain to her right flank.  Pain is worse with movement better when still.  She rates her pain a 7 out of 10 when it was at its worst currently is a 3 out of 10.  Not colicky in nature.  She has had no associated nausea vomiting bowel movements have been normal.  No dysuria urinary frequency or gross hematuria.  No fevers.  She has 2 known hepatic hemangiomas that were followed up post CT scan with MRI that confirmed hemangioma.  Prior cholecystectomy.    Allergies:  Allergies   Allergen Reactions    Latex Itching, Hives and Rash       Problem List:    Patient Active Problem List    Diagnosis Date Noted    Gastroesophageal reflux disease without esophagitis 10/11/2024     Priority: Medium    Estrogen receptor positive status (ER+) 05/12/2023     Priority: Medium    History of colitis 09/10/2020     Priority: Medium     Added automatically from request for surgery [id number]      ADHD, predominantly inattentive type 06/18/2020     Priority: Medium     Formatting of this note might be different from the original.  Diagnosed 1/2020      Depression 08/23/2019     Priority: Medium    Chronic neck and back pain 08/23/2019     Priority: Medium    Malignant neoplasm of upper-outer quadrant of female breast (H) 11/21/2018     Priority: Medium     Formatting of this note might be different from the original.  Followed by oncology    Formatting of this note might be different from the original.  Added automatically from request for surgery [id number]      Obstructive sleep apnea syndrome 11/21/2018     Priority: Medium    Class 3 severe obesity with  "serious comorbidity and body mass index (BMI) of 45.0 to 49.9 in adult, unspecified obesity type (H) 2018     Priority: Medium    Abnormal uterine bleeding 11/10/2018     Priority: Medium    Abnormal vaginal bleeding 11/10/2018     Priority: Medium    Mild intermittent asthma 10/02/2017     Priority: Medium    Major depressive disorder, single episode, moderate (H) 2017     Priority: Medium    Impaired fasting glucose 2017     Priority: Medium     Formatting of this note might be different from the original.  Overview:   Glucose 102; A1C 5.4%      Family history of uterine cancer 2015     Priority: Medium     Formatting of this note might be different from the original.  Mother, CELE Reinoso 42  MR#[id number] Uterine Cancer age 49 & Brother with colon cancer  Formatting of this note might be different from the original.  Overview:   Mother, CELE Reinoso 42  MR#[id number] Uterine Cancer age 49 & Brother with colon cancer          Past Medical History:    Past Medical History:   Diagnosis Date    Gout        Past Surgical History:    Past Surgical History:   Procedure Laterality Date    COLONOSCOPY N/A 2020    Procedure: diagnostic colonoscopy with biopsy;  Surgeon: Bong Olson MD;  Location: [facility]    MASTECTOMY, BILATERAL Bilateral        Family History:    Family History   Problem Relation Age of Onset    Cancer Mother 48        GYN. metestatic- moved to colon.    Myocardial Infarction Father 60        cause of death    No Known Problems Sister     Breast Cancer Sister 51    Suicide Brother 41    No Known Problems Maternal Grandmother     Other - See Comments Maternal Grandfather         \" of low blood presssure in a surgery\"    Diabetes Type 2  Paternal Grandmother     Other - See Comments Daughter         JI gene positive       Social History:  Marital Status:  Single [1]  Social History     Tobacco Use    Smoking status: Former    Smokeless tobacco: Never " "  Vaping Use    Vaping status: Never Used   Substance Use Topics    Alcohol use: Not Currently     Comment: rare    Drug use: Not Currently        Medications:    albuterol (PROAIR HFA) 108 (90 Base) MCG/ACT inhaler  albuterol (PROAIR HFA/PROVENTIL HFA/VENTOLIN HFA) 108 (90 Base) MCG/ACT inhaler  alum & mag hydroxide-simethicone (MAALOX MAX) 400-400-40 MG/5ML SUSP suspension  benzonatate (TESSALON) 100 MG capsule  clindamycin (CLEOCIN T) 1 % external lotion  cyclobenzaprine (FLEXERIL) 10 MG tablet  diclofenac (VOLTAREN) 50 MG EC tablet  famotidine (PEPCID) 20 MG tablet  ipratropium - albuterol 0.5 mg/2.5 mg/3 mL (DUONEB) 0.5-2.5 (3) MG/3ML neb solution  ketoconazole (NIZORAL) 2 % external shampoo  lisinopril (ZESTRIL) 10 MG tablet  loratadine (CLARITIN) 10 MG tablet  meloxicam (MOBIC) 15 MG tablet  mometasone (ELOCON) 0.1 % external cream  ondansetron (ZOFRAN ODT) 4 MG ODT tab  Semaglutide-Weight Management (WEGOVY) 0.25 MG/0.5ML pen  Semaglutide-Weight Management (WEGOVY) 0.5 MG/0.5ML pen  Semaglutide-Weight Management (WEGOVY) 1 MG/0.5ML pen  spacer (OPTICHAMBER DON) holding chamber  tiZANidine (ZANAFLEX) 4 MG tablet  trimethoprim-polymyxin b (POLYTRIM) 24280-5.1 UNIT/ML-% ophthalmic solution  vitamin D3 (CHOLECALCIFEROL) 50 mcg (2000 units) tablet          Review of Systems   All other systems reviewed and are negative.      Physical Exam   BP: (!) 176/107  Pulse: 90  Temp: 97.4  F (36.3  C)  Resp: 18  Height: 165.1 cm (5' 5\")  Weight: 125.5 kg (276 lb 12.6 oz)  SpO2: 99 %      Physical Exam  Vitals and nursing note reviewed.   Constitutional:       General: She is not in acute distress.     Appearance: Normal appearance. She is obese. She is not ill-appearing, toxic-appearing or diaphoretic.   HENT:      Head: Normocephalic and atraumatic.      Mouth/Throat:      Mouth: Mucous membranes are moist.      Pharynx: Oropharynx is clear.   Eyes:      General: No scleral icterus.     Conjunctiva/sclera: " Conjunctivae normal.   Cardiovascular:      Rate and Rhythm: Normal rate and regular rhythm.      Heart sounds: Normal heart sounds.   Pulmonary:      Effort: Pulmonary effort is normal. No respiratory distress.      Breath sounds: Normal breath sounds.   Abdominal:      General: Bowel sounds are normal. There is no distension.      Palpations: Abdomen is soft.      Tenderness: There is no abdominal tenderness. There is no right CVA tenderness or left CVA tenderness.      Hernia: No hernia is present.      Comments: Pain is along her right costochondral margin really is nontender in that area no Gage's no hyper esthesia of the skin no rashes seen   Musculoskeletal:      Cervical back: Neck supple.   Skin:     General: Skin is warm and dry.      Capillary Refill: Capillary refill takes less than 2 seconds.      Findings: No rash.   Neurological:      General: No focal deficit present.      Mental Status: She is alert and oriented to person, place, and time.   Psychiatric:         Mood and Affect: Mood normal.         Behavior: Behavior normal.         ED Course        Procedures              Critical Care time:  none             Results for orders placed or performed during the hospital encounter of 02/10/25 (from the past 24 hours)   UA with Microscopic reflex to Culture    Specimen: Urine, Clean Catch   Result Value Ref Range    Color Urine Straw Colorless, Straw, Light Yellow, Yellow    Appearance Urine Clear Clear    Glucose Urine Negative Negative mg/dL    Bilirubin Urine Negative Negative    Ketones Urine Negative Negative mg/dL    Specific Gravity Urine 1.005 1.003 - 1.035    Blood Urine Negative Negative    pH Urine 5.0 4.7 - 8.0    Protein Albumin Urine Negative Negative mg/dL    Urobilinogen Urine Normal Normal, 2.0 mg/dL    Nitrite Urine Negative Negative    Leukocyte Esterase Urine Negative Negative    Mucus Urine Present (A) None Seen /LPF    RBC Urine 0 <=2 /HPF    WBC Urine 0 <=5 /HPF    Squamous  Epithelials Urine 0 <=1 /HPF    Narrative    Urine Culture not indicated   CBC with platelets differential    Narrative    The following orders were created for panel order CBC with platelets differential.  Procedure                               Abnormality         Status                     ---------                               -----------         ------                     CBC with platelets and d...[[id number]]  Abnormal            Final result                 Please view results for these tests on the individual orders.   D dimer quantitative   Result Value Ref Range    D-Dimer Quantitative <0.30 0.00 - 0.50 ug/mL FEU    Narrative    This D-dimer assay is intended for use in conjunction with a clinical pretest probability assessment model to exclude pulmonary embolism (PE) and deep venous thrombosis (DVT) in outpatients suspected of PE or DVT. The cut-off value is 0.50 ug/mL FEU.    For patients 50 years of age or older, the application of age-adjusted cut-off values for D-Dimer may increase the specificity without significant effect on sensitivity. The literature suggested calculation age adjusted cut-off in ug/L = age in years x 10 ug/L. The results in this laboratory are reported as ug/mL rather than ug/L. The calculation for age adjusted cut off in ug/mL= age in years x 0.01 ug/mL. For example, the cut off for a 76 year old male is 76 x 0.01 ug/mL = 0.76 ug/mL (760 ug/L).    M Katherin et al. Age adjusted D-dimer cut-off levels to rule out pulmonary embolism: The ADJUST-PE Study. JAY 2014;311:1621-7108.; HJ Breana et al. Diagnostic accuracy of conventional or age adjusted D-dimer cutoff values in older patients with suspected venous thromboembolism. Systemic review and meta-analysis. BMJ 2013:346:f2492.   Comprehensive metabolic panel   Result Value Ref Range    Sodium 143 135 - 145 mmol/L    Potassium 4.2 3.4 - 5.3 mmol/L    Carbon Dioxide (CO2) 26 22 - 29 mmol/L    Anion Gap 9 7 - 15 mmol/L    Urea  Nitrogen 13.5 6.0 - 20.0 mg/dL    Creatinine 0.74 0.51 - 0.95 mg/dL    GFR Estimate >90 >60 mL/min/1.73m2    Calcium 9.6 8.8 - 10.4 mg/dL    Chloride 108 (H) 98 - 107 mmol/L    Glucose 102 (H) 70 - 99 mg/dL    Alkaline Phosphatase 84 40 - 150 U/L    AST 16 0 - 45 U/L    ALT 18 0 - 50 U/L    Protein Total 7.7 6.4 - 8.3 g/dL    Albumin 4.4 3.5 - 5.2 g/dL    Bilirubin Total 0.2 <=1.2 mg/dL   Lipase   Result Value Ref Range    Lipase 62 (H) 13 - 60 U/L   CBC with platelets and differential   Result Value Ref Range    WBC Count 9.9 4.0 - 11.0 10e3/uL    RBC Count 5.26 (H) 3.80 - 5.20 10e6/uL    Hemoglobin 16.0 (H) 11.7 - 15.7 g/dL    Hematocrit 46.5 35.0 - 47.0 %    MCV 88 78 - 100 fL    MCH 30.4 26.5 - 33.0 pg    MCHC 34.4 31.5 - 36.5 g/dL    RDW 12.2 10.0 - 15.0 %    Platelet Count 274 150 - 450 10e3/uL    % Neutrophils 65 %    % Lymphocytes 25 %    % Monocytes 6 %    % Eosinophils 2 %    % Basophils 1 %    % Immature Granulocytes 0 %    NRBCs per 100 WBC 0 <1 /100    Absolute Neutrophils 6.4 1.6 - 8.3 10e3/uL    Absolute Lymphocytes 2.5 0.8 - 5.3 10e3/uL    Absolute Monocytes 0.6 0.0 - 1.3 10e3/uL    Absolute Eosinophils 0.2 0.0 - 0.7 10e3/uL    Absolute Basophils 0.1 0.0 - 0.2 10e3/uL    Absolute Immature Granulocytes 0.0 <=0.4 10e3/uL    Absolute NRBCs 0.0 10e3/uL   Extra Tube    Narrative    The following orders were created for panel order Extra Tube.  Procedure                               Abnormality         Status                     ---------                               -----------         ------                     Extra Red Top Tube[[id number]]                               Final result               Extra Heparinized Syringe[[id number]]                        Final result                 Please view results for these tests on the individual orders.   Extra Red Top Tube   Result Value Ref Range    Hold Specimen JIC    Extra Heparinized Syringe   Result Value Ref Range    Hold Specimen JIC    CT Abdomen  Pelvis w Contrast    Narrative    PROCEDURE:  CT ABDOMEN PELVIS W CONTRAST    HISTORY: RUQ pain, phx breast cancer, known 2 hepatic hemangiomas  confirmed by MRI, prior charmaine    TECHNIQUE: Helical CT of the abdomen and pelvis was performed  following injection of intravenous contrast. This CT exam was  performed using one or more the following dose reduction techniques:  automated exposure control, adjustment of the mA and/or kV according  to patient size, and/or iterative reconstruction technique.    COMPARISON: 5/22/2023    MEDS/CONTRAST: ISOVUE 370  135mL    FINDINGS:      Limited images through the lung bases demonstrate no focal  consolidation or mass.     2 hepatic hemangiomas demonstrating discontinuous peripheral  enhancement are redemonstrated. The gallbladder is surgically absent.  The spleen, pancreas and adrenal glands are unremarkable. Symmetric  nephrograms are present without hydronephrosis. There is no abdominal  aortic aneurysm.    The bowel is normal in caliber. .    A 3.3 cm posterior exophytic fibroid is redemonstrated.    No free fluid, free air or adenopathy is seen. No suspicious osseous  lesions are identified.      Impression    IMPRESSION:      Unchanged CT appearance of the abdomen and pelvis.    DEVYN HUERTAS MD         SYSTEM ID:  [id number]       Medications   sodium chloride (PF) 0.9% PF flush 50 mL (50 mLs Intravenous $Given 2/10/25 1116)   iopamidol (ISOVUE-370) solution 135 mL (135 mLs Intravenous $Given 2/10/25 1116)       Assessments & Plan (with Medical Decision Making)     I have reviewed the nursing notes.    I have reviewed the findings, diagnosis, plan and need for follow up with the patient.           Medical Decision Making  The patient's presentation was of moderate complexity (an acute illness with systemic symptoms).    The patient's evaluation involved:  ordering and/or review of 3+ test(s) in this encounter (CT scan multiple lab tests)    The patient's management  necessitated moderate risk (54-year-old female who presented with abdominal discomfort and lower thoracic discomfort.  Diagnostic evaluations needed to include CT multiple lab tests.)    Discussed with patient negative test results she will follow-up with PCP if ongoing issues Tylenol heat if pain improves or is worse with eating consider starting omeprazole follow-up with PCP return if condition deteriorates strong return precautions provided      New Prescriptions    No medications on file       Final diagnoses:   Abdominal pain, right upper quadrant   Musculoskeletal pain       2/10/2025   [facility]       Jose Hylton MD  02/10/25 6325

## 2025-02-10 NOTE — TELEPHONE ENCOUNTER
Nurse Triage SBAR    Is this a 2nd Level Triage? NO    Situation: Abdominal Pain - RUQ- radiates into back -mid'    Background: patient has had gallbladder removed in the past. Pain starting on 2/9/25 and ranges from 4-7/10 on pain scale.     Assessment: RUQ Pain radiating into mid back on RT side. Pain started on 2/9/25, intermittent. Denies having the pain in the past. See Triage protocol for additional details.     Protocol Recommended Disposition:   See in Office Today- patient going to UC to be evaluated as US and CT are not available in clinic     Recommendation: ED/UC      Routed to provider     Does the patient meet one of the following criteria for ADS visit consideration? No     Reason for Disposition   MODERATE pain (e.g., interferes with normal activities that comes and goes (cramps) lasts > 24 hours  (Exception: Pain with Vomiting or Diarrhea - see that Protocol.)    Additional Information   Negative: Passed out (e.g., fainted, lost consciousness, blacked out and was not responding)   Negative: Shock suspected (e.g., cold/pale/clammy skin, too weak to stand, low BP, rapid pulse)   Negative: Sounds like a life-threatening emergency to the triager   Negative: Followed an abdomen (stomach) injury   Negative: Chest pain   Negative: Abdominal pain and pregnant < 20 weeks   Negative: Abdominal pain and pregnant 20 or more weeks   Negative: Pain is mainly in upper abdomen (if needed ask: 'is it mainly above the belly button?')   Negative: Abdomen bloating or swelling are main symptoms   Negative: SEVERE abdominal pain (e.g., excruciating)   Negative: Vomiting red blood or black (coffee ground) material   Negative: Blood in bowel movements  (Exception: Blood on surface of BM with constipation.)   Negative: Black or tarry bowel movements  (Exception: Chronic-unchanged black-grey BMs AND is taking iron pills or Pepto-Bismol.)   Negative: MILD TO MODERATE constant pain lasting > 2 hours   Negative: MILD TO  "MODERATE constant pain lasting > 2 hours, and age > 60 years   Negative: Vomiting bile (green color)   Negative: Patient sounds very sick or weak to the triager   Negative: Vomiting and abdomen looks much more swollen than usual   Negative: White of the eyes have turned yellow (i.e., jaundice)   Negative: Blood in urine (red, pink, or tea-colored)   Negative: Fever > 103 F (39.4 C)   Negative: Fever > 101 F (38.3 C) and over 60 years of age   Negative: Fever > 100 F (37.8 C) and has diabetes mellitus or a weak immune system (e.g., HIV positive, cancer chemotherapy, organ transplant, splenectomy, chronic steroids)   Negative: Fever > 100 F (37.8 C) and bedridden (e.g., CVA, chronic illness, recovering from surgery)    Answer Assessment - Initial Assessment Questions  1. LOCATION: \"Where does it hurt?\"       RT abdomen- upper     2. RADIATION: \"Does the pain shoot anywhere else?\" (e.g., chest, back)      Radiate to back     3. ONSET: \"When did the pain begin?\" (e.g., minutes, hours or days ago)       2/9/25    4. SUDDEN: \"Gradual or sudden onset?\"      Sudden     5. PATTERN \"Does the pain come and go, or is it constant?\"      Comes and goes     6. SEVERITY: \"How bad is the pain?\"  (e.g., Scale 1-10; mild, moderate, or severe)      4-7/10     7. RECURRENT SYMPTOM: \"Have you ever had this type of stomach pain before?\" If Yes, ask: \"When was the last time?\" and \"What happened that time?\"       Denies this type of pain in the past     8. CAUSE: \"What do you think is causing the stomach pain?\"      Unknown    9. RELIEVING/AGGRAVATING FACTORS: \"What makes it better or worse?\" (e.g., antacids, bending or twisting motion, bowel movement)      No     10. OTHER SYMPTOMS: \"Do you have any other symptoms?\" (e.g., back pain, diarrhea, fever, urination pain, vomiting)        Back pain- yes, radiates to back intermittently     11. PREGNANCY: \"Is there any chance you are pregnant?\" \"When was your last menstrual period?\"        " No    Protocols used: Abdominal Pain - Female-A-[place]

## 2025-02-13 ENCOUNTER — TELEPHONE (OUTPATIENT)
Dept: ENDOCRINOLOGY | Facility: CLINIC | Age: 55
End: 2025-02-13
Payer: COMMERCIAL

## 2025-02-13 ENCOUNTER — TELEPHONE (OUTPATIENT)
Dept: FAMILY MEDICINE | Facility: OTHER | Age: 55
End: 2025-02-13

## 2025-02-13 DIAGNOSIS — J45.20 MILD INTERMITTENT ASTHMA, UNSPECIFIED WHETHER COMPLICATED: Primary | ICD-10-CM

## 2025-02-13 RX ORDER — ALBUTEROL SULFATE 90 UG/1
1-2 INHALANT RESPIRATORY (INHALATION) EVERY 6 HOURS PRN
Qty: 18 G | Refills: 3 | Status: SHIPPED | OUTPATIENT
Start: 2025-02-13

## 2025-02-13 NOTE — TELEPHONE ENCOUNTER
Called and spoke with patient in regards to Wegovy request to increase dose. She has been on 1mg for 4 weeks. Last dose was taken 2/5/25 (due for injection yesterday). Requesting dose increase as she is not losing much weight (274lbs) and is having increased cravings. Patient has appointment last month. Plan is shown below. Routed to [employer] for review/authorization to increase to 1.7mg dose. Advised patient to schedule her follow up visit.    Increase Wegovy 1.0mg once weekly. Reach out if dose increase is needed prior to next visit.   Food goal - try having lean cuisine instead of pizza bagels at work  Activity goal - walking 2xweek for 20min   Nena Del Toro in 3-4 months

## 2025-02-13 NOTE — TELEPHONE ENCOUNTER
General Call    Contacts       Contact Date/Time Type Contact Phone/Fax    02/13/2025 08:22 AM CST Phone (Incoming) Liza Goins (Self) [phone] (M)          Reason for Call:  please call pt to discuss upping her dosage of WEGOVY    No VM or MYCH, please try a few times to reach        Okay to leave a detailed message?: Yes at Cell number on file:    Telephone Information:   Mobile [phone]

## 2025-02-13 NOTE — TELEPHONE ENCOUNTER
Reason for call:  Medication      Please note: Pt states she needs this medication filled today, otherwise may need to leave work (works at [employer]) to see if she can find an old one. Staff did advise pt of the turn-around-time message below.     Have you contacted your pharmacy? Yes   If patient has contacted Pharmacy and it has been over 72hrs, continue to #2  Medication albuterol (PROAIR HFA/PROVENTIL HFA/VENTOLIN HFA) 108 (90 Base) MCG/ACT inhaler   What [facility] you use? [facility]      (Please note that the turn-around-time for prescriptions is 72 business hours; I am sending your request at this time. SEND TO appropriate Care Team Pool )

## 2025-02-17 ENCOUNTER — TELEPHONE (OUTPATIENT)
Dept: ENDOCRINOLOGY | Facility: CLINIC | Age: 55
End: 2025-02-17
Payer: COMMERCIAL

## 2025-02-17 NOTE — TELEPHONE ENCOUNTER
Attempted to contact patient regarding Wegovy request.  Unable to leave VM as patient does not have VM setup on her phone.  Does not have MyChart.  Message sent to secure email.

## 2025-02-17 NOTE — TELEPHONE ENCOUNTER
General Call    Contacts       Contact Date/Time Type Contact Phone/Fax    02/17/2025 08:44 AM CST Phone (Incoming) Liza Goins (Self) [phone] (M)          Reason for Call:  medication update    What are your questions or concerns:  pt is checking on wegovy refill    Okay to leave a detailed message?: Yes at Cell number on file:    Telephone Information:   Mobile [phone]

## 2025-02-24 ENCOUNTER — TELEPHONE (OUTPATIENT)
Dept: ENDOCRINOLOGY | Facility: CLINIC | Age: 55
End: 2025-02-24
Payer: COMMERCIAL

## 2025-02-24 NOTE — TELEPHONE ENCOUNTER
Medication Question or Refill    Contacts       Contact Date/Time Type Contact Phone/Fax    02/24/2025 10:33 AM CST Phone (Incoming) Liza Goins (Self) [phone] (M)            What medication are you calling about (include dose and sig)?: Wegovy 1.0 mg    Preferred Pharmacy:     [address] [address]  Phone: [phone] Fax: [phone]      Controlled Substance Agreement on file:   CSA -- Patient Level:    CSA: None found at the patient level.       Who prescribed the medication?: Nena Kolb    Do you need a refill? Yes    When did you use the medication last? 2/20 or 2/21/2025, pt. Found one in her refrigerator.    Patient offered an appointment? Yes: August, 2025    Do you have any questions or concerns?  Yes: Pt. Would like to have the 1.7 mg prescribed Nena Kolb okayed the prescription and Pt. Is frustrated.       Okay to leave a detailed message?: Yes at Cell number on file:    Telephone Information:   Mobile [phone]

## 2025-02-24 NOTE — TELEPHONE ENCOUNTER
Called and spoke with patient in regards to Wegovy plan. Advised that she needs to repeat 1mg dose for 4 consecutive weeks. At which point, she needs to check in with the clinic to determine if increasing to 1.7mg dose is appropriate. Patient states she called her pharmacy and they don't have the rx. Called [facility] pharmacy. They confirmed that they have the order and it is approved by insurance for 1 more fill.

## 2025-03-21 ENCOUNTER — VIRTUAL VISIT (OUTPATIENT)
Dept: ENDOCRINOLOGY | Facility: CLINIC | Age: 55
End: 2025-03-21
Payer: COMMERCIAL

## 2025-03-21 VITALS — WEIGHT: 276 LBS | HEIGHT: 66 IN | BODY MASS INDEX: 44.36 KG/M2

## 2025-03-21 DIAGNOSIS — E66.01 CLASS 3 SEVERE OBESITY WITH SERIOUS COMORBIDITY AND BODY MASS INDEX (BMI) OF 45.0 TO 49.9 IN ADULT, UNSPECIFIED OBESITY TYPE (H): Primary | ICD-10-CM

## 2025-03-21 DIAGNOSIS — E66.813 CLASS 3 SEVERE OBESITY WITH SERIOUS COMORBIDITY AND BODY MASS INDEX (BMI) OF 45.0 TO 49.9 IN ADULT, UNSPECIFIED OBESITY TYPE (H): Primary | ICD-10-CM

## 2025-03-21 PROCEDURE — 98005 SYNCH AUDIO-VIDEO EST LOW 20: CPT

## 2025-03-21 PROCEDURE — 1126F AMNT PAIN NOTED NONE PRSNT: CPT | Mod: 95

## 2025-03-21 RX ORDER — SEMAGLUTIDE 2.4 MG/.75ML
2.4 INJECTION, SOLUTION SUBCUTANEOUS WEEKLY
Qty: 3 ML | Refills: 1 | Status: SHIPPED | OUTPATIENT
Start: 2025-05-20

## 2025-03-21 RX ORDER — SEMAGLUTIDE 1.7 MG/.75ML
1.7 INJECTION, SOLUTION SUBCUTANEOUS WEEKLY
Qty: 3 ML | Refills: 3 | Status: SHIPPED | OUTPATIENT
Start: 2025-03-21

## 2025-03-21 ASSESSMENT — PAIN SCALES - GENERAL: PAINLEVEL_OUTOF10: NO PAIN (0)

## 2025-03-21 NOTE — LETTER
3/21/2025       RE: Liza Goins  [address]  [facility] ([facility]) [address]     Dear Colleague,    Thank you for referring your patient, Liza Goins, to the [facility] WEIGHT [facility]NAGEMENT [facility]IN[facility] [place] at [facility]. Please see a copy of my visit note below.    Virtual Visit Details    Type of service:  Video Visit   Video Start Time: 2:40PM  Video End Time:3:03PM    Originating Location (pt. Location): Home    Distant Location (provider location):  Off-site  Platform used for Video Visit: [facility] Medical Weight Management Note     Liza Goins  MRN:  [id number]  :  1970  ARABELLA:  3/21/2025    Dear Yanet Britton CNP,    I had the pleasure of seeing your patient Liza Goins. She is a 55 year old female who I am continuing to see for treatment of obesity related to:        10/11/2024    11:02 AM   --   I have the following health issues associated with obesity Sleep Apnea    Polycystic Ovarian Syndrome    Asthma   I have the following symptoms associated with obesity Knee Pain    Depression    Lower Extremity Swelling    Back Pain    Fatigue    Hip Pain       Assessment & Plan  Problem List Items Addressed This Visit       Class 3 severe obesity with serious comorbidity and body mass index (BMI) of 45.0 to 49.9 in adult, unspecified obesity type (H) - Primary    Relevant Medications    Semaglutide-Weight Management (WEGOVY) 1.7 MG/0.75ML pen    Semaglutide-Weight Management (WEGOVY) 2.4 MG/0.75ML pen (Start on 2025)      Increase Wegovy 1.7mg once weekly for at least 4 weeks, if no side effects and seeing <1lb a week weight loss - increase Wegovy 2.4mg once weekly  Monitor abdominal pain - reach out if symptoms return.   Nena Del Toro in 3 months         INTERVAL HISTORY:  Weight gain through 3 pregnancies, hard to lose weight postpartum. Weight gain through breast cancer treatment. Highest weight in life of 300lbs. Able to lose 20lbs  over the past year, but was not sustainable.   Previously seen by Dr. Giraldo 6/5/2023 for MWM. Started phentermine. Has been lost to follow since.   First seen by me on 10/2024. Started Wegovy       Anti-obesity medication history    Current:   Wegovy 1.0mg - has been on this dose for 2 months. No side effects. Having increase hunger by the end of the week.     Seen in the ED on 2/10/2025  for left lower and right upper abdominal pain. CT abd pelvis was normal. Lipase slightly elevated at 62. hx of s/p charmaine. Is not currently having any pain since then.     Recent diet changes: Eating 2-3 meals a day. Minimal snacking. Protein focused. Drinking 70oz daily.     Recent exercise/activity changes: started walk hallways at [place] house due [place] weather[place]     Recent stressors: has been stable     Recent sleep changes: No concern       CURRENT WEIGHT:   276 lbs 0 oz    Initial Weight (lbs): 280 lbs  Last Visits Weight: 127 kg (280 lb)  Cumulative weight loss (lbs): 4  Weight Loss Percentage: 1.43%    Wt Readings from Last 5 Encounters:   03/21/25 125.2 kg (276 lb)   02/10/25 125.5 kg (276 lb 12.6 oz)   12/03/24 126.7 kg (279 lb 4.8 oz)   10/11/24 127 kg (280 lb)   07/01/24 127.7 kg (281 lb 9.6 oz)             3/21/2025     2:37 PM   Changes and Difficulties   I have made the following changes to my diet since my last visit: trying to make own stuff more often, meat and cheese or meat and pickles   With regards to my diet, I am still struggling with: none   I have made the following changes to my activity/exercise since my last visit: trying to walk more   With regards to my activity/exercise, I am still struggling with: could do better         MEDICATIONS:   Current Outpatient Medications   Medication Sig Dispense Refill     Semaglutide-Weight Management (WEGOVY) 1.7 MG/0.75ML pen Inject 1.7 mg subcutaneously once a week. 3 mL 3     [START ON 5/20/2025] Semaglutide-Weight Management  (WEGOVY) 2.4 MG/0.75ML pen Inject 2.4 mg subcutaneously once a week. 3 mL 1     albuterol (PROAIR HFA) 108 (90 Base) MCG/ACT inhaler Inhale 2 puffs into the lungs every 4 hours as needed for shortness of breath 8.5 g 0     albuterol (PROAIR HFA/PROVENTIL HFA/VENTOLIN HFA) 108 (90 Base) MCG/ACT inhaler Inhale 1-2 puffs into the lungs every 6 hours as needed for shortness of breath, wheezing or cough. 18 g 3     alum & mag hydroxide-simethicone (MAALOX MAX) 400-400-40 MG/5ML SUSP suspension Take 30 mLs by mouth every 4 hours as needed for indigestion or heartburn 355 mL 0     benzonatate (TESSALON) 100 MG capsule Take 1-2 capsules (100-200 mg) by mouth 3 times daily as needed for cough. 90 capsule 0     clindamycin (CLEOCIN T) 1 % external lotion Apply topically daily To affected areas of face 60 mL 1     cyclobenzaprine (FLEXERIL) 10 MG tablet Take 1 tablet (10 mg) by mouth 2 times daily as needed for muscle spasms (Patient not taking: Reported on 12/3/2024) 10 tablet 0     diclofenac (VOLTAREN) 50 MG EC tablet Take 1 tablet (50 mg) by mouth 2 times daily 180 tablet 0     famotidine (PEPCID) 20 MG tablet Take 1 tablet (20 mg) by mouth 2 times daily as needed (REFLUX). 180 tablet 1     ipratropium - albuterol 0.5 mg/2.5 mg/3 mL (DUONEB) 0.5-2.5 (3) MG/3ML neb solution Take 1 vial (3 mLs) by nebulization every 6 hours as needed for shortness of breath, wheezing or cough 75 mL 1     ketoconazole (NIZORAL) 2 % external shampoo Apply topically daily as needed for itching or irritation 100 mL 0     lisinopril (ZESTRIL) 10 MG tablet Take 1 tablet (10 mg) by mouth daily 90 tablet 0     loratadine (CLARITIN) 10 MG tablet Take 1 tablet (10 mg) by mouth daily 90 tablet 2     meloxicam (MOBIC) 15 MG tablet        mometasone (ELOCON) 0.1 % external cream Apply a thin film of cream to the affected ear canal (s) twice a day for 2 weeks, then use sparingly as needed only. 45 g 3     ondansetron (ZOFRAN ODT) 4 MG ODT tab Take 1  "tablet (4 mg) by mouth every 8 hours as needed for nausea 15 tablet 0     Semaglutide-Weight Management (WEGOVY) 0.25 MG/0.5ML pen Inject 0.25 mg subcutaneously once a week. For 4 weeks 2 mL 0     Semaglutide-Weight Management (WEGOVY) 0.5 MG/0.5ML pen Inject 0.5 mg subcutaneously once a week. After completing 4 weeks of 0.25mg dose 2 mL 2     Semaglutide-Weight Management (WEGOVY) 1 MG/0.5ML pen Inject 1 mg subcutaneously once a week. 2 mL 3     spacer (CylanceHAMBER DON) holding chamber Use with inhaler 1 each 0     tiZANidine (ZANAFLEX) 4 MG tablet Take 0.5-1 tablets (2-4 mg) by mouth 3 times daily 90 tablet 0     trimethoprim-polymyxin b (POLYTRIM) 30365-5.1 UNIT/ML-% ophthalmic solution Place 1-2 drops into the right eye every 4 hours 10 mL 0     vitamin D3 (CHOLECALCIFEROL) 50 mcg (2000 units) tablet Take 1 tablet (50 mcg) by mouth daily 90 tablet 3           3/21/2025     2:37 PM   Weight Loss Medication History Reviewed With Patient   Which weight loss medications are you currently taking on a regular basis? Wegovy   Are you having any side effects from the weight loss medication that we have prescribed you? No       Objective   Ht 1.664 m (5' 5.5\")   Wt 125.2 kg (276 lb)   BMI 45.23 kg/m    Vitals - Patient Reported  Pain Score: No Pain (0)      Vitals:  No vitals were obtained today due to virtual visit.      PHYSICAL EXAM:  GENERAL: alert and no distress  EYES: Eyes grossly normal to inspection.  No discharge or erythema, or obvious scleral/conjunctival abnormalities.  RESP: No audible wheeze, cough, or visible cyanosis.    SKIN: Visible skin clear. No significant rash, abnormal pigmentation or lesions.  NEURO: Cranial nerves grossly intact.  Mentation and speech appropriate for age.  PSYCH: Appropriate affect, tone, and pace of words        Sincerely,    Nena Kolb PA-C      23 minutes spent by me on the date of the encounter doing chart review, history and exam, documentation and further " activities per the note    The longitudinal plan of care for the diagnosis(es)/condition(s) as documented were addressed during this visit. Due to the added complexity in care, I will continue to support Liza in the subsequent management and with ongoing continuity of care.      Again, thank you for allowing me to participate in the care of your patient.      Sincerely,    Nena Kolb PA-C

## 2025-03-21 NOTE — NURSING NOTE
Current patient location:  [place]    Is the patient currently in the state of [place]? YES    Visit mode: VIDEO    If the visit is dropped, the patient can be reconnected by:VIDEO VISIT: Text to cell phone:   Telephone Information:   Mobile [phone]       Will anyone else be joining the visit? NO  (If patient encounters technical issues they should call [phone])    Are changes needed to the allergy or medication list? No    Are refills needed on medications prescribed by this physician? Discuss with provider    Rooming Documentation:  Questionnaire(s) completed    Reason for visit: ADRIEN NUÑEZ

## 2025-03-21 NOTE — PROGRESS NOTES
Virtual Visit Details    Type of service:  Video Visit   Video Start Time: 2:40PM  Video End Time:3:03PM    Originating Location (pt. Location): Home    Distant Location (provider location):  Off-site  Platform used for Video Visit: [facility] Medical Weight Management Note     Liza Goins  MRN:  [id number]  :  1970  ARABELLA:  3/21/2025    Dear Yanet Britton, CNP,    I had the pleasure of seeing your patient Liza Goins. She is a 55 year old female who I am continuing to see for treatment of obesity related to:        10/11/2024    11:02 AM   --   I have the following health issues associated with obesity Sleep Apnea    Polycystic Ovarian Syndrome    Asthma   I have the following symptoms associated with obesity Knee Pain    Depression    Lower Extremity Swelling    Back Pain    Fatigue    Hip Pain       Assessment & Plan   Problem List Items Addressed This Visit       Class 3 severe obesity with serious comorbidity and body mass index (BMI) of 45.0 to 49.9 in adult, unspecified obesity type (H) - Primary    Relevant Medications    Semaglutide-Weight Management (WEGOVY) 1.7 MG/0.75ML pen    Semaglutide-Weight Management (WEGOVY) 2.4 MG/0.75ML pen (Start on 2025)      Increase Wegovy 1.7mg once weekly for at least 4 weeks, if no side effects and seeing <1lb a week weight loss - increase Wegovy 2.4mg once weekly  Monitor abdominal pain - reach out if symptoms return.   Nena Del Toro in 3 months         INTERVAL HISTORY:  Weight gain through 3 pregnancies, hard to lose weight postpartum. Weight gain through breast cancer treatment. Highest weight in life of 300lbs. Able to lose 20lbs over the past year, but was not sustainable.   Previously seen by Dr. Giraldo 2023 for MWM. Started phentermine. Has been lost to follow since.   First seen by me on 10/2024. Started Wegovy       Anti-obesity medication history    Current:   Wegovy 1.0mg - has been on this dose for 2 months. No side effects.  Having increase hunger by the end of the week.     Seen in the ED on 2/10/2025  for left lower and right upper abdominal pain. CT abd pelvis was normal. Lipase slightly elevated at 62. hx of s/p charmaine. Is not currently having any pain since then.     Recent diet changes: Eating 2-3 meals a day. Minimal snacking. Protein focused. Drinking 70oz daily.     Recent exercise/activity changes: started walk hallways at [place] house due [place] weather[place]     Recent stressors: has been stable     Recent sleep changes: No concern       CURRENT WEIGHT:   276 lbs 0 oz    Initial Weight (lbs): 280 lbs  Last Visits Weight: 127 kg (280 lb)  Cumulative weight loss (lbs): 4  Weight Loss Percentage: 1.43%    Wt Readings from Last 5 Encounters:   03/21/25 125.2 kg (276 lb)   02/10/25 125.5 kg (276 lb 12.6 oz)   12/03/24 126.7 kg (279 lb 4.8 oz)   10/11/24 127 kg (280 lb)   07/01/24 127.7 kg (281 lb 9.6 oz)             3/21/2025     2:37 PM   Changes and Difficulties   I have made the following changes to my diet since my last visit: trying to make own stuff more often, meat and cheese or meat and pickles   With regards to my diet, I am still struggling with: none   I have made the following changes to my activity/exercise since my last visit: trying to walk more   With regards to my activity/exercise, I am still struggling with: could do better         MEDICATIONS:   Current Outpatient Medications   Medication Sig Dispense Refill    Semaglutide-Weight Management (WEGOVY) 1.7 MG/0.75ML pen Inject 1.7 mg subcutaneously once a week. 3 mL 3    [START ON 5/20/2025] Semaglutide-Weight Management (WEGOVY) 2.4 MG/0.75ML pen Inject 2.4 mg subcutaneously once a week. 3 mL 1    albuterol (PROAIR HFA) 108 (90 Base) MCG/ACT inhaler Inhale 2 puffs into the lungs every 4 hours as needed for shortness of breath 8.5 g 0    albuterol (PROAIR HFA/PROVENTIL HFA/VENTOLIN HFA) 108 (90 Base) MCG/ACT inhaler Inhale 1-2 puffs  into the lungs every 6 hours as needed for shortness of breath, wheezing or cough. 18 g 3    alum & mag hydroxide-simethicone (MAALOX MAX) 400-400-40 MG/5ML SUSP suspension Take 30 mLs by mouth every 4 hours as needed for indigestion or heartburn 355 mL 0    benzonatate (TESSALON) 100 MG capsule Take 1-2 capsules (100-200 mg) by mouth 3 times daily as needed for cough. 90 capsule 0    clindamycin (CLEOCIN T) 1 % external lotion Apply topically daily To affected areas of face 60 mL 1    cyclobenzaprine (FLEXERIL) 10 MG tablet Take 1 tablet (10 mg) by mouth 2 times daily as needed for muscle spasms (Patient not taking: Reported on 12/3/2024) 10 tablet 0    diclofenac (VOLTAREN) 50 MG EC tablet Take 1 tablet (50 mg) by mouth 2 times daily 180 tablet 0    famotidine (PEPCID) 20 MG tablet Take 1 tablet (20 mg) by mouth 2 times daily as needed (REFLUX). 180 tablet 1    ipratropium - albuterol 0.5 mg/2.5 mg/3 mL (DUONEB) 0.5-2.5 (3) MG/3ML neb solution Take 1 vial (3 mLs) by nebulization every 6 hours as needed for shortness of breath, wheezing or cough 75 mL 1    ketoconazole (NIZORAL) 2 % external shampoo Apply topically daily as needed for itching or irritation 100 mL 0    lisinopril (ZESTRIL) 10 MG tablet Take 1 tablet (10 mg) by mouth daily 90 tablet 0    loratadine (CLARITIN) 10 MG tablet Take 1 tablet (10 mg) by mouth daily 90 tablet 2    meloxicam (MOBIC) 15 MG tablet       mometasone (ELOCON) 0.1 % external cream Apply a thin film of cream to the affected ear canal (s) twice a day for 2 weeks, then use sparingly as needed only. 45 g 3    ondansetron (ZOFRAN ODT) 4 MG ODT tab Take 1 tablet (4 mg) by mouth every 8 hours as needed for nausea 15 tablet 0    Semaglutide-Weight Management (WEGOVY) 0.25 MG/0.5ML pen Inject 0.25 mg subcutaneously once a week. For 4 weeks 2 mL 0    Semaglutide-Weight Management (WEGOVY) 0.5 MG/0.5ML pen Inject 0.5 mg subcutaneously once a week. After completing 4 weeks of 0.25mg dose 2 mL  "2    Semaglutide-Weight Management (WEGOVY) 1 MG/0.5ML pen Inject 1 mg subcutaneously once a week. 2 mL 3    spacer (OPTICHAMBER DON) holding chamber Use with inhaler 1 each 0    tiZANidine (ZANAFLEX) 4 MG tablet Take 0.5-1 tablets (2-4 mg) by mouth 3 times daily 90 tablet 0    trimethoprim-polymyxin b (POLYTRIM) 64790-1.1 UNIT/ML-% ophthalmic solution Place 1-2 drops into the right eye every 4 hours 10 mL 0    vitamin D3 (CHOLECALCIFEROL) 50 mcg (2000 units) tablet Take 1 tablet (50 mcg) by mouth daily 90 tablet 3           3/21/2025     2:37 PM   Weight Loss Medication History Reviewed With Patient   Which weight loss medications are you currently taking on a regular basis? Wegovy   Are you having any side effects from the weight loss medication that we have prescribed you? No       Objective    Ht 1.664 m (5' 5.5\")   Wt 125.2 kg (276 lb)   BMI 45.23 kg/m    Vitals - Patient Reported  Pain Score: No Pain (0)      Vitals:  No vitals were obtained today due to virtual visit.      PHYSICAL EXAM:  GENERAL: alert and no distress  EYES: Eyes grossly normal to inspection.  No discharge or erythema, or obvious scleral/conjunctival abnormalities.  RESP: No audible wheeze, cough, or visible cyanosis.    SKIN: Visible skin clear. No significant rash, abnormal pigmentation or lesions.  NEURO: Cranial nerves grossly intact.  Mentation and speech appropriate for age.  PSYCH: Appropriate affect, tone, and pace of words        Sincerely,    Nena Kolb PA-C      23 minutes spent by me on the date of the encounter doing chart review, history and exam, documentation and further activities per the note    The longitudinal plan of care for the diagnosis(es)/condition(s) as documented were addressed during this visit. Due to the added complexity in care, I will continue to support Liza in the subsequent management and with ongoing continuity of care.  "

## 2025-03-21 NOTE — PROGRESS NOTES
"Virtual Visit Details    Type of service:  Video Visit   Video Start Time: {video visit start/end time for provider to select:431680}  Video End Time:{video visit start/end time for provider to select:841301}    Originating Location (pt. Location): {video visit patient location:657190::\"Home\"}  {PROVIDER LOCATION On-site should be selected for visits conducted from your clinic location or adjoining [facility] hospital, academic office, or other nearby [facility] building. Off-site should be selected for all other provider locations, including home:884504}  Distant Location (provider location):  {virtual location provider:094026}  Platform used for Video Visit: {Virtual Visit Platforms:664866::\"HouseCall\"}    "

## 2025-03-31 ENCOUNTER — TELEPHONE (OUTPATIENT)
Dept: ENDOCRINOLOGY | Facility: CLINIC | Age: 55
End: 2025-03-31
Payer: COMMERCIAL

## 2025-03-31 DIAGNOSIS — E66.01 CLASS 3 SEVERE OBESITY WITH SERIOUS COMORBIDITY AND BODY MASS INDEX (BMI) OF 45.0 TO 49.9 IN ADULT, UNSPECIFIED OBESITY TYPE (H): Primary | ICD-10-CM

## 2025-03-31 DIAGNOSIS — E66.813 CLASS 3 SEVERE OBESITY WITH SERIOUS COMORBIDITY AND BODY MASS INDEX (BMI) OF 45.0 TO 49.9 IN ADULT, UNSPECIFIED OBESITY TYPE (H): Primary | ICD-10-CM

## 2025-03-31 NOTE — TELEPHONE ENCOUNTER
PA Initiation    Medication: ZEPBOUND 5 MG/0.5ML SC SOAJ  Insurance Company: [employer] - Phone [phone] Fax [phone]  Pharmacy Filling the Rx:    Filling Pharmacy Phone:    Filling Pharmacy Fax:    Start Date: 3/31/2025    [id number]

## 2025-03-31 NOTE — PATIENT INSTRUCTIONS
Visit Plan:     Increase Wegovy 1.7mg once weekly for at least 4 weeks, if no side effects and seeing <1lb a week weight loss - increase Wegovy 2.4mg once weekly  Monitor abdominal pain - reach out if symptoms return.   Nena Del Toro in 3 months

## 2025-04-01 NOTE — TELEPHONE ENCOUNTER
Prior Authorization Approval    Medication: ZEPBOUND 5 MG/0.5ML SC SOAJ  Authorization Effective Date: 3/31/2025  Authorization Expiration Date: 9/30/2025  Approved Dose/Quantity: 2ml per 28 days  Reference #: [id number]   Insurance Company: [employer] - Phone [phone] Fax [phone]  Expected CoPay: $    CoPay Card Available:      Financial Assistance Needed:   Which Pharmacy is filling the prescription:    Pharmacy Notified:   Patient Notified:

## 2025-04-01 NOTE — TELEPHONE ENCOUNTER
Attempt to contact patient with medication update. No answer, voicemail box not set up.     Email sent with med info, as patient does not have mychart.

## 2025-04-24 ENCOUNTER — TELEPHONE (OUTPATIENT)
Dept: ENDOCRINOLOGY | Facility: CLINIC | Age: 55
End: 2025-04-24
Payer: COMMERCIAL

## 2025-04-24 DIAGNOSIS — E66.813 CLASS 3 SEVERE OBESITY WITH SERIOUS COMORBIDITY AND BODY MASS INDEX (BMI) OF 45.0 TO 49.9 IN ADULT, UNSPECIFIED OBESITY TYPE (H): ICD-10-CM

## 2025-04-24 NOTE — TELEPHONE ENCOUNTER
Called and spoke with patient in regards to Wegovy. Advised that PA was denied and Zepbound was sent/approved as alternative. Patient states she completed Wegovy 1.7mg dose last Wednesday so is due for injection. Advised that she call her pharmacy to fill the Zepbound and resume injections asap and once weekly thereafter. Discussed dosing, side effect and answered all questions.

## 2025-07-14 DIAGNOSIS — J45.20 MILD INTERMITTENT ASTHMA, UNSPECIFIED WHETHER COMPLICATED: ICD-10-CM

## 2025-07-14 NOTE — TELEPHONE ENCOUNTER
Please schedule Annual Preventative visit with Yanet Britton and return to refill pool.     VENTOLIN HFA INH W/DOS CTR [id number]         Last Written Prescription Date:  2/13/25  Last Fill Quantity: 18 g,   # refills: 3  Last Office Visit: 12/3/24  Future Office visit:       Routing refill request to provider for review/approval because:      Short-Acting Beta Agonist Inhalers Protocol  Hanhke0507/14/2025 11:51 AM   Protocol Details Asthma control assessment score within normal limits in last 6 months    Medication is active on med list and the sig matches. RN to manually verify dose and sig if red X/fail.    Recent (6 month) or future (90 days) visit with the authorizing provider's specialty (provided they have been seen in the past 9 months)         7/1/2024     3:40 PM   ACT Total Scores   ACT TOTAL SCORE (Goal Greater than or Equal to 20) 25   In the past 12 months, how many times did you visit the emergency room for your asthma without being admitted to the hospital? 1   In the past 12 months, how many times were you hospitalized overnight because of your asthma? 0

## 2025-07-22 RX ORDER — ALBUTEROL SULFATE 90 UG/1
AEROSOL, METERED RESPIRATORY (INHALATION)
Qty: 18 G | Refills: 3 | Status: SHIPPED | OUTPATIENT
Start: 2025-07-22

## (undated) DEVICE — FORCEP-COLON BIOPSY LARGE W/NEEDLE 240CM

## (undated) DEVICE — CONNECTOR-ERBEFLO 2 PORT

## (undated) DEVICE — IRRIGATION-H2O 1000ML

## (undated) DEVICE — TUBING-SUCTION 20FT